# Patient Record
Sex: MALE | Race: WHITE | NOT HISPANIC OR LATINO
[De-identification: names, ages, dates, MRNs, and addresses within clinical notes are randomized per-mention and may not be internally consistent; named-entity substitution may affect disease eponyms.]

---

## 2017-01-23 ENCOUNTER — APPOINTMENT (OUTPATIENT)
Dept: ALLERGY | Facility: CLINIC | Age: 77
End: 2017-01-23

## 2017-01-23 VITALS
HEIGHT: 73 IN | BODY MASS INDEX: 34.19 KG/M2 | HEART RATE: 68 BPM | WEIGHT: 258 LBS | RESPIRATION RATE: 16 BRPM | DIASTOLIC BLOOD PRESSURE: 70 MMHG | SYSTOLIC BLOOD PRESSURE: 128 MMHG

## 2017-01-23 RX ORDER — LINEZOLID 600 MG/1
600 TABLET, FILM COATED ORAL
Qty: 20 | Refills: 0 | Status: DISCONTINUED | COMMUNITY
Start: 2016-10-28

## 2017-01-23 RX ORDER — CHLORHEXIDINE GLUCONATE, 0.12% ORAL RINSE 1.2 MG/ML
0.12 SOLUTION DENTAL
Qty: 473 | Refills: 0 | Status: DISCONTINUED | COMMUNITY
Start: 2016-09-15

## 2017-01-23 RX ORDER — HYDROCORTISONE VALERATE 2 MG/G
0.2 OINTMENT TOPICAL
Qty: 60 | Refills: 0 | Status: DISCONTINUED | COMMUNITY
Start: 2016-11-14

## 2017-01-23 RX ORDER — OXYCODONE AND ACETAMINOPHEN 5; 325 MG/1; MG/1
5-325 TABLET ORAL
Qty: 4 | Refills: 0 | Status: DISCONTINUED | COMMUNITY
Start: 2016-11-02

## 2017-01-23 RX ORDER — KETOCONAZOLE 20 MG/G
2 CREAM TOPICAL
Qty: 60 | Refills: 0 | Status: DISCONTINUED | COMMUNITY
Start: 2016-11-14

## 2017-01-23 RX ORDER — TRAMADOL HYDROCHLORIDE 50 MG/1
50 TABLET, COATED ORAL
Qty: 60 | Refills: 0 | Status: DISCONTINUED | COMMUNITY
Start: 2016-10-05

## 2017-01-23 RX ORDER — TAMSULOSIN HYDROCHLORIDE 0.4 MG/1
0.4 CAPSULE ORAL
Qty: 90 | Refills: 0 | Status: DISCONTINUED | COMMUNITY
Start: 2016-09-08

## 2017-01-23 RX ORDER — MIRABEGRON 50 MG/1
50 TABLET, FILM COATED, EXTENDED RELEASE ORAL
Qty: 90 | Refills: 0 | Status: ACTIVE | COMMUNITY
Start: 2016-09-08

## 2017-02-07 ENCOUNTER — APPOINTMENT (OUTPATIENT)
Dept: ALLERGY | Facility: CLINIC | Age: 77
End: 2017-02-07

## 2017-02-07 VITALS
RESPIRATION RATE: 16 BRPM | SYSTOLIC BLOOD PRESSURE: 140 MMHG | BODY MASS INDEX: 35.12 KG/M2 | HEART RATE: 72 BPM | DIASTOLIC BLOOD PRESSURE: 70 MMHG | WEIGHT: 265 LBS | HEIGHT: 73 IN

## 2017-03-06 ENCOUNTER — APPOINTMENT (OUTPATIENT)
Dept: ALLERGY | Facility: CLINIC | Age: 77
End: 2017-03-06

## 2017-03-06 VITALS
DIASTOLIC BLOOD PRESSURE: 70 MMHG | WEIGHT: 265 LBS | HEART RATE: 68 BPM | RESPIRATION RATE: 16 BRPM | BODY MASS INDEX: 35.12 KG/M2 | HEIGHT: 73 IN | SYSTOLIC BLOOD PRESSURE: 130 MMHG

## 2017-03-06 RX ORDER — PREDNISONE 10 MG/1
10 TABLET ORAL
Qty: 60 | Refills: 0 | Status: DISCONTINUED | COMMUNITY
Start: 2017-02-14 | End: 2017-03-06

## 2017-03-06 RX ORDER — PREDNISONE 10 MG/1
10 TABLET ORAL
Qty: 60 | Refills: 0 | Status: DISCONTINUED | COMMUNITY
Start: 2017-01-23 | End: 2017-03-06

## 2017-03-06 RX ORDER — DOXYCYCLINE HYCLATE 100 MG/1
100 CAPSULE ORAL
Qty: 20 | Refills: 0 | Status: DISCONTINUED | COMMUNITY
Start: 2017-01-18 | End: 2017-03-06

## 2017-03-06 RX ORDER — AMOXICILLIN 500 MG/1
500 CAPSULE ORAL
Qty: 25 | Refills: 0 | Status: DISCONTINUED | COMMUNITY
Start: 2016-09-15 | End: 2017-03-06

## 2017-03-28 ENCOUNTER — APPOINTMENT (OUTPATIENT)
Dept: ALLERGY | Facility: CLINIC | Age: 77
End: 2017-03-28

## 2017-03-28 VITALS
RESPIRATION RATE: 16 BRPM | WEIGHT: 265 LBS | HEART RATE: 68 BPM | HEIGHT: 73 IN | SYSTOLIC BLOOD PRESSURE: 140 MMHG | BODY MASS INDEX: 35.12 KG/M2 | DIASTOLIC BLOOD PRESSURE: 94 MMHG

## 2017-03-28 RX ORDER — SOFT LENS DISINFECTANT
SOLUTION, NON-ORAL MISCELLANEOUS
Qty: 1 | Refills: 0 | Status: ACTIVE | OUTPATIENT
Start: 2017-03-28

## 2017-04-04 ENCOUNTER — APPOINTMENT (OUTPATIENT)
Dept: ALLERGY | Facility: CLINIC | Age: 77
End: 2017-04-04

## 2017-04-04 ENCOUNTER — APPOINTMENT (OUTPATIENT)
Dept: RADIOLOGY | Facility: IMAGING CENTER | Age: 77
End: 2017-04-04

## 2017-04-04 ENCOUNTER — OUTPATIENT (OUTPATIENT)
Dept: OUTPATIENT SERVICES | Facility: HOSPITAL | Age: 77
LOS: 1 days | End: 2017-04-04
Payer: COMMERCIAL

## 2017-04-04 VITALS
DIASTOLIC BLOOD PRESSURE: 88 MMHG | HEART RATE: 72 BPM | BODY MASS INDEX: 35.12 KG/M2 | WEIGHT: 265 LBS | HEIGHT: 73 IN | RESPIRATION RATE: 16 BRPM | SYSTOLIC BLOOD PRESSURE: 140 MMHG

## 2017-04-04 DIAGNOSIS — J45.909 UNSPECIFIED ASTHMA, UNCOMPLICATED: ICD-10-CM

## 2017-04-04 PROCEDURE — 71046 X-RAY EXAM CHEST 2 VIEWS: CPT

## 2017-04-04 PROCEDURE — 71020: CPT | Mod: 26

## 2017-04-10 ENCOUNTER — MESSAGE (OUTPATIENT)
Age: 77
End: 2017-04-10

## 2017-04-13 ENCOUNTER — APPOINTMENT (OUTPATIENT)
Dept: ALLERGY | Facility: CLINIC | Age: 77
End: 2017-04-13

## 2017-04-17 ENCOUNTER — MESSAGE (OUTPATIENT)
Age: 77
End: 2017-04-17

## 2017-04-24 ENCOUNTER — RX RENEWAL (OUTPATIENT)
Age: 77
End: 2017-04-24

## 2017-04-26 ENCOUNTER — APPOINTMENT (OUTPATIENT)
Dept: ALLERGY | Facility: CLINIC | Age: 77
End: 2017-04-26

## 2017-04-26 VITALS
BODY MASS INDEX: 35.12 KG/M2 | DIASTOLIC BLOOD PRESSURE: 80 MMHG | RESPIRATION RATE: 14 BRPM | HEART RATE: 72 BPM | WEIGHT: 265 LBS | SYSTOLIC BLOOD PRESSURE: 140 MMHG | HEIGHT: 73 IN

## 2017-04-26 RX ORDER — AMOXICILLIN AND CLAVULANATE POTASSIUM 875; 125 MG/1; MG/1
875-125 TABLET, COATED ORAL
Qty: 20 | Refills: 0 | Status: DISCONTINUED | COMMUNITY
Start: 2017-04-04 | End: 2017-04-26

## 2017-05-02 ENCOUNTER — APPOINTMENT (OUTPATIENT)
Dept: CT IMAGING | Facility: CLINIC | Age: 77
End: 2017-05-02

## 2017-05-02 ENCOUNTER — OUTPATIENT (OUTPATIENT)
Dept: OUTPATIENT SERVICES | Facility: HOSPITAL | Age: 77
LOS: 1 days | End: 2017-05-02
Payer: COMMERCIAL

## 2017-05-02 DIAGNOSIS — Z00.8 ENCOUNTER FOR OTHER GENERAL EXAMINATION: ICD-10-CM

## 2017-05-02 PROCEDURE — 71250 CT THORAX DX C-: CPT | Mod: 26

## 2017-05-02 PROCEDURE — 71250 CT THORAX DX C-: CPT

## 2017-05-08 ENCOUNTER — MESSAGE (OUTPATIENT)
Age: 77
End: 2017-05-08

## 2017-05-10 ENCOUNTER — MESSAGE (OUTPATIENT)
Age: 77
End: 2017-05-10

## 2017-05-11 ENCOUNTER — APPOINTMENT (OUTPATIENT)
Dept: ALLERGY | Facility: CLINIC | Age: 77
End: 2017-05-11

## 2017-05-11 ENCOUNTER — APPOINTMENT (OUTPATIENT)
Dept: PULMONOLOGY | Facility: CLINIC | Age: 77
End: 2017-05-11

## 2017-05-11 VITALS
RESPIRATION RATE: 17 BRPM | WEIGHT: 278 LBS | HEIGHT: 73 IN | DIASTOLIC BLOOD PRESSURE: 60 MMHG | HEART RATE: 90 BPM | OXYGEN SATURATION: 94 % | SYSTOLIC BLOOD PRESSURE: 130 MMHG | BODY MASS INDEX: 36.84 KG/M2

## 2017-05-11 DIAGNOSIS — Z86.39 PERSONAL HISTORY OF OTHER ENDOCRINE, NUTRITIONAL AND METABOLIC DISEASE: ICD-10-CM

## 2017-05-11 DIAGNOSIS — F41.9 ANXIETY DISORDER, UNSPECIFIED: ICD-10-CM

## 2017-05-11 DIAGNOSIS — F32.9 ANXIETY DISORDER, UNSPECIFIED: ICD-10-CM

## 2017-05-11 DIAGNOSIS — F10.21 ALCOHOL DEPENDENCE, IN REMISSION: ICD-10-CM

## 2017-05-11 DIAGNOSIS — Z86.79 PERSONAL HISTORY OF OTHER DISEASES OF THE CIRCULATORY SYSTEM: ICD-10-CM

## 2017-05-11 DIAGNOSIS — Z81.8 FAMILY HISTORY OF OTHER MENTAL AND BEHAVIORAL DISORDERS: ICD-10-CM

## 2017-05-11 DIAGNOSIS — N40.0 BENIGN PROSTATIC HYPERPLASIA WITHOUT LOWER URINARY TRACT SYMPMS: ICD-10-CM

## 2017-05-11 DIAGNOSIS — N32.89 OTHER SPECIFIED DISORDERS OF BLADDER: ICD-10-CM

## 2017-05-11 RX ORDER — PREDNISONE 10 MG/1
10 TABLET ORAL
Qty: 60 | Refills: 0 | Status: DISCONTINUED | COMMUNITY
Start: 2017-03-28 | End: 2017-05-11

## 2017-05-11 RX ORDER — RAMIPRIL 5 MG/1
5 CAPSULE ORAL
Refills: 0 | Status: ACTIVE | COMMUNITY

## 2017-05-12 ENCOUNTER — LABORATORY RESULT (OUTPATIENT)
Age: 77
End: 2017-05-12

## 2017-05-13 LAB
24R-OH-CALCIDIOL SERPL-MCNC: 60.9 PG/ML
25(OH)D3 SERPL-MCNC: 32.4 NG/ML
BASOPHILS # BLD AUTO: 0.01 K/UL
BASOPHILS NFR BLD AUTO: 0.1 %
DEPRECATED KAPPA LC FREE/LAMBDA SER: 0.65 RATIO
EOSINOPHIL # BLD AUTO: 0.01 K/UL
EOSINOPHIL NFR BLD AUTO: 0.1 %
HCT VFR BLD CALC: 34.3 %
HGB BLD-MCNC: 12.1 G/DL
IGA SER QL IEP: 116 MG/DL
IGE SER-MCNC: 189 IU/ML
IGG SER QL IEP: 550 MG/DL
IGM SER QL IEP: 68 MG/DL
IMM GRANULOCYTES NFR BLD AUTO: 0.3 %
KAPPA LC CSF-MCNC: 1.71 MG/DL
KAPPA LC SERPL-MCNC: 1.12 MG/DL
LYMPHOCYTES # BLD AUTO: 0.88 K/UL
LYMPHOCYTES NFR BLD AUTO: 12.8 %
MAN DIFF?: NORMAL
MCHC RBC-ENTMCNC: 35.3 GM/DL
MCHC RBC-ENTMCNC: 35.3 PG
MCV RBC AUTO: 100 FL
MONOCYTES # BLD AUTO: 0.31 K/UL
MONOCYTES NFR BLD AUTO: 4.5 %
NEUTROPHILS # BLD AUTO: 5.62 K/UL
NEUTROPHILS NFR BLD AUTO: 82.2 %
PLATELET # BLD AUTO: 151 K/UL
RBC # BLD: 3.43 M/UL
RBC # FLD: 24.8 %
WBC # FLD AUTO: 6.85 K/UL

## 2017-05-17 ENCOUNTER — APPOINTMENT (OUTPATIENT)
Dept: SLEEP CENTER | Facility: CLINIC | Age: 77
End: 2017-05-17

## 2017-05-17 ENCOUNTER — OUTPATIENT (OUTPATIENT)
Dept: OUTPATIENT SERVICES | Facility: HOSPITAL | Age: 77
LOS: 1 days | End: 2017-05-17
Payer: MEDICARE

## 2017-05-17 LAB
A FLAVUS AB FLD QL: NEGATIVE
A FUMIGATUS AB FLD QL: NEGATIVE
A NIGER AB FLD QL: NEGATIVE

## 2017-05-17 PROCEDURE — G0399: CPT

## 2017-05-18 LAB
ASPERGILLUS FLAVUS PRECIPITINS: NEGATIVE
ASPERGILLUS FUMIGATES PRECIPTINS: NEGATIVE
ASPERGILLUS NIGER PRECIPITINS: NEGATIVE
DEPRECATED S PNEUM 1 IGG SER-MCNC: 12.6 MCG/ML
DEPRECATED S PNEUM12 AB SER-ACNC: 0.7 MCG/ML
DEPRECATED S PNEUM14 AB SER-ACNC: 6.5 MCG/ML
DEPRECATED S PNEUM17 IGG SER IA-MCNC: 17.4 MCG/ML
DEPRECATED S PNEUM18 IGG SER IA-MCNC: 1.6 MCG/ML
DEPRECATED S PNEUM19 IGG SER-MCNC: 14.8 MCG/ML
DEPRECATED S PNEUM19 IGG SER-MCNC: 6.7 MCG/ML
DEPRECATED S PNEUM2 IGG SER-MCNC: 0.9 MCG/ML
DEPRECATED S PNEUM20 IGG SER-MCNC: 3.4 MCG/ML
DEPRECATED S PNEUM22 IGG SER-MCNC: 11.3 MCG/ML
DEPRECATED S PNEUM23 AB SER-ACNC: 25.4 MCG/ML
DEPRECATED S PNEUM3 AB SER-ACNC: 2.5 MCG/ML
DEPRECATED S PNEUM34 IGG SER-MCNC: 15.9 MCG/ML
DEPRECATED S PNEUM4 AB SER-ACNC: 0.7 MCG/ML
DEPRECATED S PNEUM5 IGG SER-MCNC: 8.6 MCG/ML
DEPRECATED S PNEUM6 IGG SER-MCNC: 17.2 MCG/ML
DEPRECATED S PNEUM7 IGG SER-ACNC: 7.1 MCG/ML
DEPRECATED S PNEUM8 AB SER-ACNC: 6.7 MCG/ML
DEPRECATED S PNEUM9 AB SER-ACNC: 6.4 MCG/ML
DEPRECATED S PNEUM9 IGG SER-MCNC: 8 MCG/ML
IGG SUBSET TOTAL IGG: 561 MG/DL
IGG1 SER-MCNC: 291 MG/DL
IGG2 SER-MCNC: 193 MG/DL
IGG3 SER-MCNC: 22.7 MG/DL
IGG4 SER-MCNC: 41.2 MG/DL
STREPTOCOCCUS PNEUMONIAE SEROTYPE 11A: 3.9 MCG/ML
STREPTOCOCCUS PNEUMONIAE SEROTYPE 15B: 6 MCG/ML
STREPTOCOCCUS PNEUMONIAE SEROTYPE 33F: 4.1 MCG/ML

## 2017-05-22 ENCOUNTER — RESULT REVIEW (OUTPATIENT)
Age: 77
End: 2017-05-22

## 2017-05-22 ENCOUNTER — MEDICATION RENEWAL (OUTPATIENT)
Age: 77
End: 2017-05-22

## 2017-05-23 DIAGNOSIS — G47.33 OBSTRUCTIVE SLEEP APNEA (ADULT) (PEDIATRIC): ICD-10-CM

## 2017-06-05 ENCOUNTER — MEDICATION RENEWAL (OUTPATIENT)
Age: 77
End: 2017-06-05

## 2017-06-08 ENCOUNTER — APPOINTMENT (OUTPATIENT)
Dept: PULMONOLOGY | Facility: CLINIC | Age: 77
End: 2017-06-08

## 2017-06-08 VITALS
BODY MASS INDEX: 36.45 KG/M2 | HEIGHT: 73 IN | DIASTOLIC BLOOD PRESSURE: 72 MMHG | WEIGHT: 275 LBS | OXYGEN SATURATION: 94 % | RESPIRATION RATE: 17 BRPM | SYSTOLIC BLOOD PRESSURE: 164 MMHG | HEART RATE: 96 BPM

## 2017-06-08 RX ORDER — FINASTERIDE 5 MG/1
5 TABLET, FILM COATED ORAL
Qty: 30 | Refills: 0 | Status: ACTIVE | COMMUNITY
Start: 2017-01-31

## 2017-07-11 ENCOUNTER — APPOINTMENT (OUTPATIENT)
Dept: PULMONOLOGY | Facility: CLINIC | Age: 77
End: 2017-07-11
Payer: COMMERCIAL

## 2017-07-11 VITALS
HEIGHT: 73 IN | DIASTOLIC BLOOD PRESSURE: 80 MMHG | HEART RATE: 92 BPM | BODY MASS INDEX: 36.45 KG/M2 | OXYGEN SATURATION: 94 % | SYSTOLIC BLOOD PRESSURE: 135 MMHG | WEIGHT: 275 LBS | RESPIRATION RATE: 16 BRPM

## 2017-07-11 PROCEDURE — 94010 BREATHING CAPACITY TEST: CPT

## 2017-07-11 PROCEDURE — 99214 OFFICE O/P EST MOD 30 MIN: CPT | Mod: 25

## 2017-07-11 PROCEDURE — 94729 DIFFUSING CAPACITY: CPT

## 2017-08-02 ENCOUNTER — APPOINTMENT (OUTPATIENT)
Dept: SLEEP CENTER | Facility: CLINIC | Age: 77
End: 2017-08-02
Payer: COMMERCIAL

## 2017-08-02 ENCOUNTER — OUTPATIENT (OUTPATIENT)
Dept: OUTPATIENT SERVICES | Facility: HOSPITAL | Age: 77
LOS: 1 days | End: 2017-08-02
Payer: COMMERCIAL

## 2017-08-02 PROCEDURE — G0400: CPT

## 2017-08-02 PROCEDURE — G0400: CPT | Mod: 26

## 2017-08-09 DIAGNOSIS — G47.33 OBSTRUCTIVE SLEEP APNEA (ADULT) (PEDIATRIC): ICD-10-CM

## 2017-08-22 ENCOUNTER — OUTPATIENT (OUTPATIENT)
Dept: OUTPATIENT SERVICES | Facility: HOSPITAL | Age: 77
LOS: 1 days | End: 2017-08-22
Payer: COMMERCIAL

## 2017-08-22 ENCOUNTER — APPOINTMENT (OUTPATIENT)
Dept: SLEEP CENTER | Facility: CLINIC | Age: 77
End: 2017-08-22
Payer: COMMERCIAL

## 2017-08-22 PROCEDURE — 95811 POLYSOM 6/>YRS CPAP 4/> PARM: CPT

## 2017-08-22 PROCEDURE — 95811 POLYSOM 6/>YRS CPAP 4/> PARM: CPT | Mod: 26

## 2017-08-24 DIAGNOSIS — G47.33 OBSTRUCTIVE SLEEP APNEA (ADULT) (PEDIATRIC): ICD-10-CM

## 2017-08-31 ENCOUNTER — APPOINTMENT (OUTPATIENT)
Dept: PULMONOLOGY | Facility: CLINIC | Age: 77
End: 2017-08-31
Payer: COMMERCIAL

## 2017-08-31 VITALS
OXYGEN SATURATION: 95 % | HEART RATE: 85 BPM | RESPIRATION RATE: 16 BRPM | HEIGHT: 73 IN | SYSTOLIC BLOOD PRESSURE: 135 MMHG | DIASTOLIC BLOOD PRESSURE: 80 MMHG | WEIGHT: 287 LBS | BODY MASS INDEX: 38.04 KG/M2

## 2017-08-31 DIAGNOSIS — Z29.9 ENCOUNTER FOR PROPHYLACTIC MEASURES, UNSPECIFIED: ICD-10-CM

## 2017-08-31 PROCEDURE — 95012 NITRIC OXIDE EXP GAS DETER: CPT

## 2017-08-31 PROCEDURE — 94010 BREATHING CAPACITY TEST: CPT

## 2017-08-31 PROCEDURE — 99214 OFFICE O/P EST MOD 30 MIN: CPT | Mod: 25

## 2017-08-31 PROCEDURE — 96372 THER/PROPH/DIAG INJ SC/IM: CPT

## 2017-08-31 RX ORDER — OMALIZUMAB 202.5 MG/1.4ML
150 INJECTION, SOLUTION SUBCUTANEOUS
Qty: 45 | Refills: 0 | Status: COMPLETED | OUTPATIENT
Start: 2017-08-31

## 2017-08-31 RX ORDER — FLUTICASONE PROPIONATE AND SALMETEROL 50; 500 UG/1; UG/1
500-50 POWDER RESPIRATORY (INHALATION) TWICE DAILY
Qty: 3 | Refills: 1 | Status: DISCONTINUED | COMMUNITY
Start: 2017-03-06 | End: 2017-08-31

## 2017-08-31 RX ADMIN — OMALIZUMAB 0 MG: 202.5 INJECTION, SOLUTION SUBCUTANEOUS at 00:00

## 2017-09-14 ENCOUNTER — APPOINTMENT (OUTPATIENT)
Dept: PULMONOLOGY | Facility: CLINIC | Age: 77
End: 2017-09-14
Payer: COMMERCIAL

## 2017-09-14 VITALS
OXYGEN SATURATION: 97 % | RESPIRATION RATE: 16 BRPM | SYSTOLIC BLOOD PRESSURE: 140 MMHG | HEART RATE: 83 BPM | HEIGHT: 73 IN | DIASTOLIC BLOOD PRESSURE: 80 MMHG | WEIGHT: 287 LBS | BODY MASS INDEX: 38.04 KG/M2

## 2017-09-14 PROCEDURE — 96372 THER/PROPH/DIAG INJ SC/IM: CPT

## 2017-09-14 RX ORDER — OMALIZUMAB 202.5 MG/1.4ML
150 INJECTION, SOLUTION SUBCUTANEOUS
Qty: 45 | Refills: 0 | Status: COMPLETED | OUTPATIENT
Start: 2017-09-14

## 2017-09-14 RX ADMIN — OMALIZUMAB 0 MG: 202.5 INJECTION, SOLUTION SUBCUTANEOUS at 00:00

## 2017-09-19 ENCOUNTER — RESULT REVIEW (OUTPATIENT)
Age: 77
End: 2017-09-19

## 2017-09-28 ENCOUNTER — APPOINTMENT (OUTPATIENT)
Dept: PULMONOLOGY | Facility: CLINIC | Age: 77
End: 2017-09-28
Payer: COMMERCIAL

## 2017-09-28 VITALS
BODY MASS INDEX: 38.04 KG/M2 | OXYGEN SATURATION: 96 % | DIASTOLIC BLOOD PRESSURE: 80 MMHG | RESPIRATION RATE: 17 BRPM | HEART RATE: 88 BPM | WEIGHT: 287 LBS | SYSTOLIC BLOOD PRESSURE: 128 MMHG | HEIGHT: 73 IN

## 2017-09-28 PROCEDURE — 96372 THER/PROPH/DIAG INJ SC/IM: CPT

## 2017-09-28 RX ORDER — OMALIZUMAB 202.5 MG/1.4ML
150 INJECTION, SOLUTION SUBCUTANEOUS
Qty: 45 | Refills: 0 | Status: COMPLETED | OUTPATIENT
Start: 2017-09-28

## 2017-09-28 RX ADMIN — OMALIZUMAB 0 MG: 202.5 INJECTION, SOLUTION SUBCUTANEOUS at 00:00

## 2017-10-11 ENCOUNTER — APPOINTMENT (OUTPATIENT)
Dept: PULMONOLOGY | Facility: CLINIC | Age: 77
End: 2017-10-11
Payer: COMMERCIAL

## 2017-10-11 VITALS
HEART RATE: 83 BPM | SYSTOLIC BLOOD PRESSURE: 128 MMHG | WEIGHT: 287 LBS | OXYGEN SATURATION: 94 % | HEIGHT: 73 IN | DIASTOLIC BLOOD PRESSURE: 80 MMHG | BODY MASS INDEX: 38.04 KG/M2

## 2017-10-11 PROCEDURE — 95012 NITRIC OXIDE EXP GAS DETER: CPT

## 2017-10-11 PROCEDURE — 99214 OFFICE O/P EST MOD 30 MIN: CPT | Mod: 25

## 2017-10-11 PROCEDURE — 96372 THER/PROPH/DIAG INJ SC/IM: CPT

## 2017-10-11 PROCEDURE — 94010 BREATHING CAPACITY TEST: CPT

## 2017-10-11 PROCEDURE — G0008: CPT

## 2017-10-11 PROCEDURE — 90662 IIV NO PRSV INCREASED AG IM: CPT

## 2017-10-11 RX ORDER — OMALIZUMAB 202.5 MG/1.4ML
150 INJECTION, SOLUTION SUBCUTANEOUS
Qty: 45 | Refills: 0 | Status: COMPLETED | OUTPATIENT
Start: 2017-10-11

## 2017-10-11 RX ADMIN — OMALIZUMAB 0 MG: 202.5 INJECTION, SOLUTION SUBCUTANEOUS at 00:00

## 2017-10-25 ENCOUNTER — APPOINTMENT (OUTPATIENT)
Dept: PULMONOLOGY | Facility: CLINIC | Age: 77
End: 2017-10-25
Payer: COMMERCIAL

## 2017-10-25 VITALS
SYSTOLIC BLOOD PRESSURE: 130 MMHG | RESPIRATION RATE: 17 BRPM | OXYGEN SATURATION: 93 % | WEIGHT: 287 LBS | HEIGHT: 73 IN | DIASTOLIC BLOOD PRESSURE: 80 MMHG | HEART RATE: 72 BPM | BODY MASS INDEX: 38.04 KG/M2

## 2017-10-25 PROCEDURE — 96372 THER/PROPH/DIAG INJ SC/IM: CPT

## 2017-10-25 RX ORDER — OMALIZUMAB 202.5 MG/1.4ML
150 INJECTION, SOLUTION SUBCUTANEOUS
Qty: 45 | Refills: 0 | Status: COMPLETED | OUTPATIENT
Start: 2017-10-25

## 2017-10-25 RX ADMIN — OMALIZUMAB 0 MG: 202.5 INJECTION, SOLUTION SUBCUTANEOUS at 00:00

## 2017-11-08 ENCOUNTER — APPOINTMENT (OUTPATIENT)
Dept: PULMONOLOGY | Facility: CLINIC | Age: 77
End: 2017-11-08
Payer: COMMERCIAL

## 2017-11-08 ENCOUNTER — MEDICATION RENEWAL (OUTPATIENT)
Age: 77
End: 2017-11-08

## 2017-11-08 VITALS
DIASTOLIC BLOOD PRESSURE: 80 MMHG | HEIGHT: 73 IN | WEIGHT: 287 LBS | OXYGEN SATURATION: 93 % | BODY MASS INDEX: 38.04 KG/M2 | SYSTOLIC BLOOD PRESSURE: 130 MMHG | HEART RATE: 93 BPM

## 2017-11-08 PROCEDURE — 96372 THER/PROPH/DIAG INJ SC/IM: CPT

## 2017-11-08 RX ORDER — OMALIZUMAB 202.5 MG/1.4ML
150 INJECTION, SOLUTION SUBCUTANEOUS
Qty: 45 | Refills: 0 | Status: COMPLETED | OUTPATIENT
Start: 2017-11-08

## 2017-11-08 RX ADMIN — OMALIZUMAB 0 MG: 202.5 INJECTION, SOLUTION SUBCUTANEOUS at 00:00

## 2017-11-14 ENCOUNTER — APPOINTMENT (OUTPATIENT)
Dept: SLEEP CENTER | Facility: CLINIC | Age: 77
End: 2017-11-14
Payer: COMMERCIAL

## 2017-11-14 ENCOUNTER — OUTPATIENT (OUTPATIENT)
Dept: OUTPATIENT SERVICES | Facility: HOSPITAL | Age: 77
LOS: 1 days | End: 2017-11-14
Payer: COMMERCIAL

## 2017-11-14 PROCEDURE — 95811 POLYSOM 6/>YRS CPAP 4/> PARM: CPT | Mod: 26

## 2017-11-14 PROCEDURE — 95811 POLYSOM 6/>YRS CPAP 4/> PARM: CPT

## 2017-11-15 DIAGNOSIS — G47.33 OBSTRUCTIVE SLEEP APNEA (ADULT) (PEDIATRIC): ICD-10-CM

## 2017-11-22 ENCOUNTER — APPOINTMENT (OUTPATIENT)
Dept: PULMONOLOGY | Facility: CLINIC | Age: 77
End: 2017-11-22
Payer: COMMERCIAL

## 2017-11-22 VITALS
HEART RATE: 98 BPM | OXYGEN SATURATION: 93 % | HEIGHT: 73 IN | SYSTOLIC BLOOD PRESSURE: 130 MMHG | BODY MASS INDEX: 38.04 KG/M2 | WEIGHT: 287 LBS | DIASTOLIC BLOOD PRESSURE: 80 MMHG

## 2017-11-22 PROCEDURE — 96372 THER/PROPH/DIAG INJ SC/IM: CPT

## 2017-11-22 RX ORDER — OMALIZUMAB 202.5 MG/1.4ML
150 INJECTION, SOLUTION SUBCUTANEOUS
Qty: 45 | Refills: 0 | Status: COMPLETED | OUTPATIENT
Start: 2017-11-22

## 2017-11-22 RX ADMIN — OMALIZUMAB 0 MG: 202.5 INJECTION, SOLUTION SUBCUTANEOUS at 00:00

## 2017-11-30 ENCOUNTER — RESULT REVIEW (OUTPATIENT)
Age: 77
End: 2017-11-30

## 2017-12-06 ENCOUNTER — APPOINTMENT (OUTPATIENT)
Dept: PULMONOLOGY | Facility: CLINIC | Age: 77
End: 2017-12-06
Payer: COMMERCIAL

## 2017-12-06 VITALS
HEIGHT: 73 IN | BODY MASS INDEX: 37.91 KG/M2 | WEIGHT: 286 LBS | DIASTOLIC BLOOD PRESSURE: 70 MMHG | HEART RATE: 75 BPM | OXYGEN SATURATION: 95 % | SYSTOLIC BLOOD PRESSURE: 140 MMHG | RESPIRATION RATE: 16 BRPM

## 2017-12-06 DIAGNOSIS — Z23 ENCOUNTER FOR IMMUNIZATION: ICD-10-CM

## 2017-12-06 PROCEDURE — 96372 THER/PROPH/DIAG INJ SC/IM: CPT

## 2017-12-06 PROCEDURE — 99214 OFFICE O/P EST MOD 30 MIN: CPT | Mod: 25

## 2017-12-06 RX ORDER — FUROSEMIDE 40 MG/1
40 TABLET ORAL
Qty: 30 | Refills: 0 | Status: ACTIVE | COMMUNITY
Start: 2017-10-05

## 2017-12-06 RX ORDER — OMALIZUMAB 202.5 MG/1.4ML
150 INJECTION, SOLUTION SUBCUTANEOUS
Qty: 1 | Refills: 0 | Status: COMPLETED | OUTPATIENT
Start: 2017-12-06

## 2017-12-06 RX ORDER — POTASSIUM CHLORIDE 1500 MG/1
20 TABLET, EXTENDED RELEASE ORAL
Qty: 30 | Refills: 0 | Status: ACTIVE | COMMUNITY
Start: 2017-10-05

## 2017-12-06 RX ADMIN — OMALIZUMAB 0 MG: 202.5 INJECTION, SOLUTION SUBCUTANEOUS at 00:00

## 2017-12-18 ENCOUNTER — RX RENEWAL (OUTPATIENT)
Age: 77
End: 2017-12-18

## 2017-12-21 ENCOUNTER — APPOINTMENT (OUTPATIENT)
Dept: PULMONOLOGY | Facility: CLINIC | Age: 77
End: 2017-12-21
Payer: COMMERCIAL

## 2017-12-21 VITALS
RESPIRATION RATE: 17 BRPM | HEIGHT: 73 IN | OXYGEN SATURATION: 96 % | SYSTOLIC BLOOD PRESSURE: 136 MMHG | WEIGHT: 286 LBS | BODY MASS INDEX: 37.91 KG/M2 | DIASTOLIC BLOOD PRESSURE: 64 MMHG | HEART RATE: 77 BPM

## 2017-12-21 PROCEDURE — 96372 THER/PROPH/DIAG INJ SC/IM: CPT

## 2017-12-21 RX ADMIN — OMALIZUMAB 0 MG: 202.5 INJECTION, SOLUTION SUBCUTANEOUS at 00:00

## 2018-01-04 ENCOUNTER — APPOINTMENT (OUTPATIENT)
Dept: PULMONOLOGY | Facility: CLINIC | Age: 78
End: 2018-01-04

## 2018-01-23 ENCOUNTER — APPOINTMENT (OUTPATIENT)
Dept: PULMONOLOGY | Facility: CLINIC | Age: 78
End: 2018-01-23
Payer: COMMERCIAL

## 2018-01-23 VITALS
HEIGHT: 73 IN | OXYGEN SATURATION: 94 % | WEIGHT: 286 LBS | HEART RATE: 84 BPM | BODY MASS INDEX: 37.91 KG/M2 | SYSTOLIC BLOOD PRESSURE: 112 MMHG | RESPIRATION RATE: 17 BRPM | DIASTOLIC BLOOD PRESSURE: 70 MMHG

## 2018-01-23 PROCEDURE — 94010 BREATHING CAPACITY TEST: CPT

## 2018-01-23 PROCEDURE — 99214 OFFICE O/P EST MOD 30 MIN: CPT | Mod: 25

## 2018-01-23 PROCEDURE — 96372 THER/PROPH/DIAG INJ SC/IM: CPT

## 2018-01-23 PROCEDURE — 94729 DIFFUSING CAPACITY: CPT

## 2018-01-23 RX ORDER — OMALIZUMAB 202.5 MG/1.4ML
150 INJECTION, SOLUTION SUBCUTANEOUS
Qty: 45 | Refills: 0 | Status: COMPLETED | OUTPATIENT
Start: 2018-01-23

## 2018-01-23 RX ADMIN — OMALIZUMAB 0 MG: 202.5 INJECTION, SOLUTION SUBCUTANEOUS at 00:00

## 2018-02-02 ENCOUNTER — RX RENEWAL (OUTPATIENT)
Age: 78
End: 2018-02-02

## 2018-02-08 ENCOUNTER — RX RENEWAL (OUTPATIENT)
Age: 78
End: 2018-02-08

## 2018-02-12 ENCOUNTER — MEDICATION RENEWAL (OUTPATIENT)
Age: 78
End: 2018-02-12

## 2018-02-14 ENCOUNTER — APPOINTMENT (OUTPATIENT)
Dept: PULMONOLOGY | Facility: CLINIC | Age: 78
End: 2018-02-14
Payer: COMMERCIAL

## 2018-02-14 PROCEDURE — 96372 THER/PROPH/DIAG INJ SC/IM: CPT

## 2018-02-14 RX ORDER — OMALIZUMAB 202.5 MG/1.4ML
150 INJECTION, SOLUTION SUBCUTANEOUS
Qty: 45 | Refills: 0 | Status: COMPLETED | OUTPATIENT
Start: 2018-02-14

## 2018-02-14 RX ADMIN — OMALIZUMAB 0 MG: 202.5 INJECTION, SOLUTION SUBCUTANEOUS at 00:00

## 2018-03-02 ENCOUNTER — APPOINTMENT (OUTPATIENT)
Dept: PULMONOLOGY | Facility: CLINIC | Age: 78
End: 2018-03-02
Payer: COMMERCIAL

## 2018-03-02 VITALS
OXYGEN SATURATION: 95 % | DIASTOLIC BLOOD PRESSURE: 60 MMHG | HEIGHT: 73 IN | SYSTOLIC BLOOD PRESSURE: 120 MMHG | HEART RATE: 92 BPM | BODY MASS INDEX: 37.91 KG/M2 | WEIGHT: 286 LBS

## 2018-03-02 PROCEDURE — 96372 THER/PROPH/DIAG INJ SC/IM: CPT

## 2018-03-02 RX ORDER — OMALIZUMAB 202.5 MG/1.4ML
150 INJECTION, SOLUTION SUBCUTANEOUS
Qty: 45 | Refills: 0 | Status: COMPLETED | OUTPATIENT
Start: 2018-03-02

## 2018-03-02 RX ADMIN — OMALIZUMAB 1.5 MG: 202.5 INJECTION, SOLUTION SUBCUTANEOUS at 00:00

## 2018-03-07 ENCOUNTER — RX RENEWAL (OUTPATIENT)
Age: 78
End: 2018-03-07

## 2018-03-15 ENCOUNTER — APPOINTMENT (OUTPATIENT)
Dept: PULMONOLOGY | Facility: CLINIC | Age: 78
End: 2018-03-15
Payer: COMMERCIAL

## 2018-03-15 VITALS
HEIGHT: 73 IN | WEIGHT: 286 LBS | SYSTOLIC BLOOD PRESSURE: 120 MMHG | RESPIRATION RATE: 17 BRPM | DIASTOLIC BLOOD PRESSURE: 64 MMHG | HEART RATE: 88 BPM | OXYGEN SATURATION: 94 % | BODY MASS INDEX: 37.91 KG/M2

## 2018-03-15 PROCEDURE — 96372 THER/PROPH/DIAG INJ SC/IM: CPT

## 2018-03-15 RX ORDER — OMALIZUMAB 202.5 MG/1.4ML
150 INJECTION, SOLUTION SUBCUTANEOUS
Qty: 45 | Refills: 0 | Status: COMPLETED | OUTPATIENT
Start: 2018-03-15

## 2018-03-15 RX ADMIN — OMALIZUMAB 0 MG: 202.5 INJECTION, SOLUTION SUBCUTANEOUS at 00:00

## 2018-03-29 ENCOUNTER — APPOINTMENT (OUTPATIENT)
Dept: PULMONOLOGY | Facility: CLINIC | Age: 78
End: 2018-03-29
Payer: COMMERCIAL

## 2018-03-29 VITALS
DIASTOLIC BLOOD PRESSURE: 70 MMHG | WEIGHT: 286 LBS | RESPIRATION RATE: 17 BRPM | SYSTOLIC BLOOD PRESSURE: 110 MMHG | OXYGEN SATURATION: 95 % | HEIGHT: 73 IN | HEART RATE: 83 BPM | BODY MASS INDEX: 37.91 KG/M2

## 2018-03-29 PROCEDURE — 96372 THER/PROPH/DIAG INJ SC/IM: CPT

## 2018-03-29 RX ORDER — OMALIZUMAB 202.5 MG/1.4ML
150 INJECTION, SOLUTION SUBCUTANEOUS
Qty: 60 | Refills: 0 | Status: COMPLETED | OUTPATIENT
Start: 2018-03-29

## 2018-03-29 RX ADMIN — OMALIZUMAB 0 MG: 202.5 INJECTION, SOLUTION SUBCUTANEOUS at 00:00

## 2018-04-12 ENCOUNTER — APPOINTMENT (OUTPATIENT)
Dept: PULMONOLOGY | Facility: CLINIC | Age: 78
End: 2018-04-12
Payer: COMMERCIAL

## 2018-04-12 PROCEDURE — 96372 THER/PROPH/DIAG INJ SC/IM: CPT

## 2018-04-12 RX ORDER — OMALIZUMAB 202.5 MG/1.4ML
150 INJECTION, SOLUTION SUBCUTANEOUS
Qty: 45 | Refills: 0 | Status: COMPLETED | OUTPATIENT
Start: 2018-04-12

## 2018-04-12 RX ADMIN — OMALIZUMAB 0 MG: 202.5 INJECTION, SOLUTION SUBCUTANEOUS at 00:00

## 2018-04-25 ENCOUNTER — APPOINTMENT (OUTPATIENT)
Dept: PULMONOLOGY | Facility: CLINIC | Age: 78
End: 2018-04-25
Payer: COMMERCIAL

## 2018-04-25 VITALS
HEART RATE: 84 BPM | HEIGHT: 73 IN | BODY MASS INDEX: 37.91 KG/M2 | SYSTOLIC BLOOD PRESSURE: 138 MMHG | WEIGHT: 286 LBS | OXYGEN SATURATION: 95 % | RESPIRATION RATE: 17 BRPM | DIASTOLIC BLOOD PRESSURE: 78 MMHG

## 2018-04-25 PROCEDURE — 94010 BREATHING CAPACITY TEST: CPT

## 2018-04-25 PROCEDURE — 99214 OFFICE O/P EST MOD 30 MIN: CPT | Mod: 25

## 2018-04-25 PROCEDURE — 96372 THER/PROPH/DIAG INJ SC/IM: CPT

## 2018-04-25 RX ORDER — OMALIZUMAB 202.5 MG/1.4ML
150 INJECTION, SOLUTION SUBCUTANEOUS
Qty: 45 | Refills: 0 | Status: COMPLETED | OUTPATIENT
Start: 2018-04-25

## 2018-04-25 RX ORDER — OLOPATADINE HYDROCHLORIDE 665 UG/1
0.6 SPRAY, METERED NASAL
Qty: 3 | Refills: 1 | Status: ACTIVE | COMMUNITY
Start: 2018-04-25 | End: 1900-01-01

## 2018-04-25 RX ADMIN — OMALIZUMAB 0 MG: 202.5 INJECTION, SOLUTION SUBCUTANEOUS at 00:00

## 2018-05-09 ENCOUNTER — APPOINTMENT (OUTPATIENT)
Dept: PULMONOLOGY | Facility: CLINIC | Age: 78
End: 2018-05-09
Payer: COMMERCIAL

## 2018-05-09 VITALS
OXYGEN SATURATION: 95 % | WEIGHT: 286 LBS | SYSTOLIC BLOOD PRESSURE: 130 MMHG | DIASTOLIC BLOOD PRESSURE: 62 MMHG | RESPIRATION RATE: 17 BRPM | HEART RATE: 82 BPM | HEIGHT: 73 IN | BODY MASS INDEX: 37.91 KG/M2

## 2018-05-09 PROCEDURE — 96372 THER/PROPH/DIAG INJ SC/IM: CPT

## 2018-05-09 RX ORDER — OMALIZUMAB 202.5 MG/1.4ML
150 INJECTION, SOLUTION SUBCUTANEOUS
Qty: 45 | Refills: 0 | Status: COMPLETED | OUTPATIENT
Start: 2018-05-09

## 2018-05-09 RX ADMIN — OMALIZUMAB 0 MG: 202.5 INJECTION, SOLUTION SUBCUTANEOUS at 00:00

## 2018-05-23 ENCOUNTER — APPOINTMENT (OUTPATIENT)
Dept: PULMONOLOGY | Facility: CLINIC | Age: 78
End: 2018-05-23
Payer: COMMERCIAL

## 2018-05-23 VITALS
WEIGHT: 290 LBS | BODY MASS INDEX: 38.43 KG/M2 | RESPIRATION RATE: 17 BRPM | HEIGHT: 73 IN | SYSTOLIC BLOOD PRESSURE: 124 MMHG | HEART RATE: 92 BPM | OXYGEN SATURATION: 94 % | DIASTOLIC BLOOD PRESSURE: 62 MMHG

## 2018-05-23 PROCEDURE — 96372 THER/PROPH/DIAG INJ SC/IM: CPT

## 2018-05-23 RX ORDER — OMALIZUMAB 202.5 MG/1.4ML
150 INJECTION, SOLUTION SUBCUTANEOUS
Qty: 45 | Refills: 0 | Status: COMPLETED | OUTPATIENT
Start: 2018-05-23

## 2018-05-23 RX ADMIN — OMALIZUMAB 0 MG: 202.5 INJECTION, SOLUTION SUBCUTANEOUS at 00:00

## 2018-05-29 ENCOUNTER — RX RENEWAL (OUTPATIENT)
Age: 78
End: 2018-05-29

## 2018-06-06 ENCOUNTER — APPOINTMENT (OUTPATIENT)
Dept: PULMONOLOGY | Facility: CLINIC | Age: 78
End: 2018-06-06
Payer: COMMERCIAL

## 2018-06-06 VITALS
BODY MASS INDEX: 37.77 KG/M2 | OXYGEN SATURATION: 94 % | SYSTOLIC BLOOD PRESSURE: 142 MMHG | RESPIRATION RATE: 17 BRPM | WEIGHT: 285 LBS | DIASTOLIC BLOOD PRESSURE: 64 MMHG | HEART RATE: 67 BPM | HEIGHT: 73 IN

## 2018-06-06 PROCEDURE — 96372 THER/PROPH/DIAG INJ SC/IM: CPT

## 2018-06-06 RX ORDER — OMALIZUMAB 202.5 MG/1.4ML
150 INJECTION, SOLUTION SUBCUTANEOUS
Qty: 45 | Refills: 0 | Status: COMPLETED | OUTPATIENT
Start: 2018-06-06

## 2018-06-06 RX ADMIN — Medication 0 MG: at 00:00

## 2018-06-13 ENCOUNTER — RX RENEWAL (OUTPATIENT)
Age: 78
End: 2018-06-13

## 2018-06-19 ENCOUNTER — MEDICATION RENEWAL (OUTPATIENT)
Age: 78
End: 2018-06-19

## 2018-06-20 ENCOUNTER — APPOINTMENT (OUTPATIENT)
Dept: PULMONOLOGY | Facility: CLINIC | Age: 78
End: 2018-06-20
Payer: COMMERCIAL

## 2018-06-20 ENCOUNTER — NON-APPOINTMENT (OUTPATIENT)
Age: 78
End: 2018-06-20

## 2018-06-20 VITALS
HEIGHT: 73 IN | OXYGEN SATURATION: 94 % | BODY MASS INDEX: 37.77 KG/M2 | WEIGHT: 285 LBS | HEART RATE: 95 BPM | RESPIRATION RATE: 17 BRPM | SYSTOLIC BLOOD PRESSURE: 140 MMHG | DIASTOLIC BLOOD PRESSURE: 70 MMHG

## 2018-06-20 PROCEDURE — 94010 BREATHING CAPACITY TEST: CPT

## 2018-06-20 PROCEDURE — 99214 OFFICE O/P EST MOD 30 MIN: CPT | Mod: 25

## 2018-06-20 PROCEDURE — 96372 THER/PROPH/DIAG INJ SC/IM: CPT

## 2018-06-20 RX ORDER — OMALIZUMAB 202.5 MG/1.4ML
150 INJECTION, SOLUTION SUBCUTANEOUS
Qty: 45 | Refills: 0 | Status: COMPLETED | OUTPATIENT
Start: 2018-06-20

## 2018-06-20 RX ADMIN — Medication 0 MG: at 00:00

## 2018-06-21 ENCOUNTER — FORM ENCOUNTER (OUTPATIENT)
Age: 78
End: 2018-06-21

## 2018-06-22 ENCOUNTER — OUTPATIENT (OUTPATIENT)
Dept: OUTPATIENT SERVICES | Facility: HOSPITAL | Age: 78
LOS: 1 days | End: 2018-06-22
Payer: MEDICARE

## 2018-06-22 ENCOUNTER — APPOINTMENT (OUTPATIENT)
Dept: CT IMAGING | Facility: IMAGING CENTER | Age: 78
End: 2018-06-22
Payer: COMMERCIAL

## 2018-06-22 DIAGNOSIS — J39.8 OTHER SPECIFIED DISEASES OF UPPER RESPIRATORY TRACT: ICD-10-CM

## 2018-06-22 PROCEDURE — 71250 CT THORAX DX C-: CPT

## 2018-06-22 PROCEDURE — 71250 CT THORAX DX C-: CPT | Mod: 26

## 2018-07-03 ENCOUNTER — APPOINTMENT (OUTPATIENT)
Dept: WOUND CARE | Facility: CLINIC | Age: 78
End: 2018-07-03
Payer: COMMERCIAL

## 2018-07-03 VITALS
HEIGHT: 73 IN | TEMPERATURE: 98.1 F | RESPIRATION RATE: 18 BRPM | BODY MASS INDEX: 37.77 KG/M2 | DIASTOLIC BLOOD PRESSURE: 76 MMHG | HEART RATE: 87 BPM | SYSTOLIC BLOOD PRESSURE: 146 MMHG | WEIGHT: 285 LBS

## 2018-07-03 PROCEDURE — 99203 OFFICE O/P NEW LOW 30 MIN: CPT

## 2018-07-06 ENCOUNTER — APPOINTMENT (OUTPATIENT)
Dept: PULMONOLOGY | Facility: CLINIC | Age: 78
End: 2018-07-06
Payer: COMMERCIAL

## 2018-07-06 VITALS
HEART RATE: 94 BPM | WEIGHT: 285 LBS | OXYGEN SATURATION: 92 % | HEIGHT: 74 IN | SYSTOLIC BLOOD PRESSURE: 120 MMHG | DIASTOLIC BLOOD PRESSURE: 60 MMHG | BODY MASS INDEX: 36.57 KG/M2

## 2018-07-06 PROCEDURE — 96372 THER/PROPH/DIAG INJ SC/IM: CPT

## 2018-07-06 RX ORDER — OMALIZUMAB 202.5 MG/1.4ML
150 INJECTION, SOLUTION SUBCUTANEOUS
Qty: 45 | Refills: 0 | Status: COMPLETED | OUTPATIENT
Start: 2018-07-06

## 2018-07-06 RX ADMIN — Medication 0 MG: at 00:00

## 2018-07-09 ENCOUNTER — RX RENEWAL (OUTPATIENT)
Age: 78
End: 2018-07-09

## 2018-07-10 ENCOUNTER — APPOINTMENT (OUTPATIENT)
Dept: WOUND CARE | Facility: CLINIC | Age: 78
End: 2018-07-10
Payer: COMMERCIAL

## 2018-07-10 PROCEDURE — 99213 OFFICE O/P EST LOW 20 MIN: CPT

## 2018-07-11 ENCOUNTER — APPOINTMENT (OUTPATIENT)
Dept: PULMONOLOGY | Facility: CLINIC | Age: 78
End: 2018-07-11
Payer: COMMERCIAL

## 2018-07-11 VITALS
RESPIRATION RATE: 17 BRPM | HEIGHT: 73 IN | SYSTOLIC BLOOD PRESSURE: 110 MMHG | BODY MASS INDEX: 38.04 KG/M2 | WEIGHT: 287 LBS | DIASTOLIC BLOOD PRESSURE: 60 MMHG | OXYGEN SATURATION: 94 % | HEART RATE: 87 BPM

## 2018-07-11 PROCEDURE — 99214 OFFICE O/P EST MOD 30 MIN: CPT | Mod: 25

## 2018-07-11 PROCEDURE — 94060 EVALUATION OF WHEEZING: CPT

## 2018-07-11 PROCEDURE — 95012 NITRIC OXIDE EXP GAS DETER: CPT

## 2018-07-13 ENCOUNTER — APPOINTMENT (OUTPATIENT)
Dept: WOUND CARE | Facility: CLINIC | Age: 78
End: 2018-07-13
Payer: COMMERCIAL

## 2018-07-13 ENCOUNTER — APPOINTMENT (OUTPATIENT)
Dept: VASCULAR SURGERY | Facility: CLINIC | Age: 78
End: 2018-07-13
Payer: COMMERCIAL

## 2018-07-13 PROCEDURE — 93970 EXTREMITY STUDY: CPT

## 2018-07-13 PROCEDURE — 29581 APPL MULTLAYER CMPRN SYS LEG: CPT | Mod: RT

## 2018-07-25 ENCOUNTER — APPOINTMENT (OUTPATIENT)
Dept: PULMONOLOGY | Facility: CLINIC | Age: 78
End: 2018-07-25
Payer: COMMERCIAL

## 2018-07-25 VITALS
OXYGEN SATURATION: 93 % | HEART RATE: 85 BPM | DIASTOLIC BLOOD PRESSURE: 70 MMHG | HEIGHT: 73 IN | BODY MASS INDEX: 39.04 KG/M2 | SYSTOLIC BLOOD PRESSURE: 130 MMHG | WEIGHT: 294.53 LBS | RESPIRATION RATE: 17 BRPM

## 2018-07-25 PROCEDURE — 96372 THER/PROPH/DIAG INJ SC/IM: CPT

## 2018-07-25 RX ORDER — OMALIZUMAB 202.5 MG/1.4ML
150 INJECTION, SOLUTION SUBCUTANEOUS
Qty: 45 | Refills: 0 | Status: COMPLETED | OUTPATIENT
Start: 2018-07-25

## 2018-07-25 RX ADMIN — OMALIZUMAB 0 MG: 202.5 INJECTION, SOLUTION SUBCUTANEOUS at 00:00

## 2018-07-27 ENCOUNTER — APPOINTMENT (OUTPATIENT)
Dept: WOUND CARE | Facility: CLINIC | Age: 78
End: 2018-07-27
Payer: COMMERCIAL

## 2018-07-27 VITALS
TEMPERATURE: 97.8 F | DIASTOLIC BLOOD PRESSURE: 76 MMHG | HEIGHT: 72 IN | WEIGHT: 294 LBS | SYSTOLIC BLOOD PRESSURE: 128 MMHG | BODY MASS INDEX: 39.82 KG/M2 | HEART RATE: 82 BPM

## 2018-07-27 PROCEDURE — 29581 APPL MULTLAYER CMPRN SYS LEG: CPT | Mod: RT

## 2018-07-27 RX ORDER — PREDNISONE 10 MG/1
10 TABLET ORAL
Qty: 1 | Refills: 0 | Status: DISCONTINUED | COMMUNITY
Start: 2017-12-21 | End: 2018-07-27

## 2018-07-27 RX ORDER — PREDNISONE 10 MG/1
10 TABLET ORAL
Qty: 1 | Refills: 0 | Status: DISCONTINUED | COMMUNITY
Start: 2017-12-18 | End: 2018-07-27

## 2018-07-27 RX ORDER — PREDNISONE 10 MG/1
10 TABLET ORAL
Qty: 100 | Refills: 0 | Status: DISCONTINUED | COMMUNITY
Start: 2017-05-22 | End: 2018-07-27

## 2018-07-30 ENCOUNTER — APPOINTMENT (OUTPATIENT)
Dept: WOUND CARE | Facility: CLINIC | Age: 78
End: 2018-07-30

## 2018-08-08 ENCOUNTER — APPOINTMENT (OUTPATIENT)
Dept: PULMONOLOGY | Facility: CLINIC | Age: 78
End: 2018-08-08
Payer: COMMERCIAL

## 2018-08-08 VITALS
HEART RATE: 80 BPM | OXYGEN SATURATION: 96 % | RESPIRATION RATE: 17 BRPM | WEIGHT: 294 LBS | SYSTOLIC BLOOD PRESSURE: 150 MMHG | HEIGHT: 73 IN | DIASTOLIC BLOOD PRESSURE: 60 MMHG | BODY MASS INDEX: 38.97 KG/M2

## 2018-08-08 PROCEDURE — 99214 OFFICE O/P EST MOD 30 MIN: CPT | Mod: 25

## 2018-08-08 PROCEDURE — 96372 THER/PROPH/DIAG INJ SC/IM: CPT

## 2018-08-08 RX ORDER — OMALIZUMAB 202.5 MG/1.4ML
150 INJECTION, SOLUTION SUBCUTANEOUS
Qty: 1 | Refills: 0 | Status: COMPLETED | OUTPATIENT
Start: 2018-08-08

## 2018-08-08 RX ADMIN — OMALIZUMAB 0 MG: 202.5 INJECTION, SOLUTION SUBCUTANEOUS at 00:00

## 2018-08-15 ENCOUNTER — APPOINTMENT (OUTPATIENT)
Dept: WOUND CARE | Facility: CLINIC | Age: 78
End: 2018-08-15
Payer: COMMERCIAL

## 2018-08-15 VITALS
RESPIRATION RATE: 18 BRPM | BODY MASS INDEX: 38.97 KG/M2 | DIASTOLIC BLOOD PRESSURE: 73 MMHG | HEIGHT: 73 IN | SYSTOLIC BLOOD PRESSURE: 119 MMHG | HEART RATE: 81 BPM | TEMPERATURE: 97.4 F | WEIGHT: 294 LBS

## 2018-08-15 PROCEDURE — 29581 APPL MULTLAYER CMPRN SYS LEG: CPT | Mod: RT

## 2018-08-22 ENCOUNTER — APPOINTMENT (OUTPATIENT)
Dept: PULMONOLOGY | Facility: CLINIC | Age: 78
End: 2018-08-22
Payer: COMMERCIAL

## 2018-08-22 ENCOUNTER — APPOINTMENT (OUTPATIENT)
Dept: WOUND CARE | Facility: CLINIC | Age: 78
End: 2018-08-22
Payer: COMMERCIAL

## 2018-08-22 VITALS
WEIGHT: 293.21 LBS | OXYGEN SATURATION: 93 % | HEIGHT: 73 IN | BODY MASS INDEX: 38.86 KG/M2 | SYSTOLIC BLOOD PRESSURE: 130 MMHG | DIASTOLIC BLOOD PRESSURE: 76 MMHG | RESPIRATION RATE: 16 BRPM | HEART RATE: 80 BPM

## 2018-08-22 VITALS — DIASTOLIC BLOOD PRESSURE: 64 MMHG | SYSTOLIC BLOOD PRESSURE: 134 MMHG | HEART RATE: 94 BPM

## 2018-08-22 PROCEDURE — 96372 THER/PROPH/DIAG INJ SC/IM: CPT

## 2018-08-22 PROCEDURE — 29581 APPL MULTLAYER CMPRN SYS LEG: CPT | Mod: RT

## 2018-08-22 RX ORDER — OMALIZUMAB 202.5 MG/1.4ML
150 INJECTION, SOLUTION SUBCUTANEOUS
Qty: 45 | Refills: 0 | Status: COMPLETED | OUTPATIENT
Start: 2018-08-22

## 2018-08-22 RX ADMIN — OMALIZUMAB 0 MG: 202.5 INJECTION, SOLUTION SUBCUTANEOUS at 00:00

## 2018-09-05 ENCOUNTER — APPOINTMENT (OUTPATIENT)
Dept: PULMONOLOGY | Facility: CLINIC | Age: 78
End: 2018-09-05
Payer: COMMERCIAL

## 2018-09-05 ENCOUNTER — APPOINTMENT (OUTPATIENT)
Dept: WOUND CARE | Facility: CLINIC | Age: 78
End: 2018-09-05
Payer: COMMERCIAL

## 2018-09-05 PROCEDURE — 96372 THER/PROPH/DIAG INJ SC/IM: CPT

## 2018-09-05 PROCEDURE — 99213 OFFICE O/P EST LOW 20 MIN: CPT

## 2018-09-05 RX ORDER — OMALIZUMAB 202.5 MG/1.4ML
150 INJECTION, SOLUTION SUBCUTANEOUS
Qty: 45 | Refills: 0 | Status: COMPLETED | OUTPATIENT
Start: 2018-09-05

## 2018-09-05 RX ADMIN — OMALIZUMAB 0 MG: 202.5 INJECTION, SOLUTION SUBCUTANEOUS at 00:00

## 2018-09-18 ENCOUNTER — APPOINTMENT (OUTPATIENT)
Dept: VASCULAR SURGERY | Facility: CLINIC | Age: 78
End: 2018-09-18
Payer: COMMERCIAL

## 2018-09-18 PROCEDURE — 36475 ENDOVENOUS RF 1ST VEIN: CPT | Mod: RT

## 2018-09-22 ENCOUNTER — RX RENEWAL (OUTPATIENT)
Age: 78
End: 2018-09-22

## 2018-09-24 ENCOUNTER — APPOINTMENT (OUTPATIENT)
Dept: VASCULAR SURGERY | Facility: CLINIC | Age: 78
End: 2018-09-24
Payer: COMMERCIAL

## 2018-09-24 ENCOUNTER — APPOINTMENT (OUTPATIENT)
Dept: WOUND CARE | Facility: CLINIC | Age: 78
End: 2018-09-24
Payer: COMMERCIAL

## 2018-09-24 PROCEDURE — 11101 BIOPSY SKIN SUBQ&/MUCOUS MEMBRANE EA ADDL LESN: CPT

## 2018-09-24 PROCEDURE — 93971 EXTREMITY STUDY: CPT

## 2018-09-24 PROCEDURE — 11100 BX SKIN SUBCUTANEOUS&/MUCOUS MEMBRANE 1 LESION: CPT

## 2018-09-26 ENCOUNTER — NON-APPOINTMENT (OUTPATIENT)
Age: 78
End: 2018-09-26

## 2018-09-26 ENCOUNTER — MED ADMIN CHARGE (OUTPATIENT)
Age: 78
End: 2018-09-26

## 2018-09-26 ENCOUNTER — APPOINTMENT (OUTPATIENT)
Dept: PULMONOLOGY | Facility: CLINIC | Age: 78
End: 2018-09-26
Payer: COMMERCIAL

## 2018-09-26 VITALS
BODY MASS INDEX: 39.36 KG/M2 | DIASTOLIC BLOOD PRESSURE: 80 MMHG | SYSTOLIC BLOOD PRESSURE: 124 MMHG | HEART RATE: 84 BPM | HEIGHT: 73 IN | OXYGEN SATURATION: 94 % | WEIGHT: 297 LBS

## 2018-09-26 PROCEDURE — 96372 THER/PROPH/DIAG INJ SC/IM: CPT

## 2018-09-26 PROCEDURE — 94010 BREATHING CAPACITY TEST: CPT

## 2018-09-26 PROCEDURE — 99214 OFFICE O/P EST MOD 30 MIN: CPT | Mod: 25

## 2018-09-26 RX ORDER — OMALIZUMAB 202.5 MG/1.4ML
150 INJECTION, SOLUTION SUBCUTANEOUS
Qty: 45 | Refills: 0 | Status: COMPLETED | OUTPATIENT
Start: 2018-09-26

## 2018-09-26 RX ADMIN — OMALIZUMAB 1.5 MG: 202.5 INJECTION, SOLUTION SUBCUTANEOUS at 00:00

## 2018-09-26 NOTE — REASON FOR VISIT
[Follow-Up] : a follow-up visit [FreeTextEntry1] : asthma, abnormal CT, elevated IgE level, KHADRA, postnasal drip, poor balance, snoring, TBM and shortness of breath

## 2018-09-26 NOTE — ASSESSMENT
[FreeTextEntry1] : Mr. Jimenez is a 78 year old male with a history of asthma, HTN, HLD, hypothyroidism, bladder/prostate issue; anxiety/depression, non-smoker, now coming back for pulmonary re-evaluation. He is stable from a pulmonary perspective aside from his weight.\par \par His shortness of breath is felt to be multifactorial due to:\par -severe persistent asthma, controlled \par -poor breathing mechanics\par -emphysema\par -overweight \par -out of shape\par -? CAD \par -? tracheobronchomalacia (50%)\par \par problem 1: severe persistent asthma / COPD / emphysema\par -continue DuoNeb by the nebulizer TID \par -continue Symbicort 160 2 inhalations BID then Qvar 80 at 2 puffs BID\par -Continue Incruse 1 inhalation QD \par -continue to use Singulair 10 mg QHS \par \par \par -Asthma is believed to be caused by inherited (genetic) and environmental factor, but its exact cause is unknown. Asthma may be triggered by allergens, lung infections, or irritants in the air. Asthma triggers are different for each person\par -Inhaler technique reviewed as well as oral hygiene techniques reviewed with patient. Avoidance of cold air, extremes of temperature, rescue inhaler should be used before exercise. Order of medication reviewed with patient. Recommended use of a cool mist humidifier in the bedroom. \par \par problem 2: poor breathing mechanics\par -Proper breathing techniques were reviewed with an emphasis of exhalation. Patient instructed to breath in for 1 second and out for four seconds. Patient was encouraged to not talk while walking. \par \par problem 3: abnormal chest CT/ r/o TBM (50%)\par -c/w mucus plugging\par -recommended to add acapella device to be done multiple times daily \par -follow up chest CT 12/2018\par \par problem 4: allergic rhinitis\par -continue Olopatadine 0.6% 1 sniff each nostril up to twice daily\par -continue Clarinex 5 mg QHS \par -Continue Qnasl 1 sniff/nostril BID\par -Recommending Zaditor 1 eye drop BID\par \par -Environmental measures for allergies were encouraged including mattress and pillow cover, air purifier, and environmental controls.\par \par problem 5: r/o ABPA \par -Based on blood work (not present)\par \par problem 6: obesity \par -recommended to read 'Bright Spots and Landmines' by author Toi Gillette and 'Obesity Code' by Jason Reis \par -Weight loss, exercise, and diet control were discussed and are highly encouraged. Treatment options were given such as, aqua therapy, and contacting a nutritionist. Recommended to use the elliptical, stationary bike, less use of treadmill. Obesity is associated with worsening asthma, shortness of breath, and potential for cardiac disease, diabetes, and other underlying medical conditions.\par \par problem 7: OSAS (stable now)\par -continue CPAP (increased pressure necessary)- compliant\par -Sleep apnea is associated with adverse clinical consequences which an affect most organ systems. Cardiovascular disease risk includes arrhythmias, atrial fibrillation, hypertension, coronary artery disease, and stroke. Metabolic disorders include diabetes type 2, non-alcoholic fatty liver disease. Mood disorder especially depression; and cognitive decline especially in the elderly. Associations with chronic reflux/Mancera’s esophagus some but not all inclusive. \par -Reasons to assess this include arousal consistent with hypopnea; respiratory events most prominent in REM sleep or supine position; therefore sleep staging and body position are important for accurate diagnosis and estimation of AHI. \par \par Problem 8: elevated IgE\par -Receiving Xolair today\par -Xolair is a recombinant DNA- derived humanized IgG1K monoclonal antibody that selectively binds to human immunoglobulin E (IgE). Xolair is produced by a Chinese hamster ovary cell suspension culture in nutrient medium containing the antibiotic gentamicin. Gentamicin is not detectable in the final product. Xolair is a sterile, white, preservative free, lyophilized powder contained in a single use vial that is reconstituted with sterile water for suspension. Side effects include: wheezing, tightness of the chest, trouble breathing, hives, skin rash, feeling anxious or light-headed, fainting, warmth or tingling under skin, or swelling of face, lips, or tongue \par \par problem 9: poor balance\par -balance therapy\par \par problem 10: health maintenance \par -s/p influenza vaccination\par -recommended strep pneumonia vaccines: Prevnar-13 vaccine, followed by Pneumo vaccine 23 one year following\par -recommended early intervention for URIs\par -recommended regular osteoporosis evaluations\par -recommended early dermatological evaluations\par -recommended after the age of 50 to the age of 70, colonoscopy every 5 years \par  \par \par F/U in 3 months\par He is encouraged to call with any changes, concerns, or questions. \par

## 2018-09-26 NOTE — PHYSICAL EXAM
[General Appearance - Well Developed] : well developed [Normal Appearance] : normal appearance [Well Groomed] : well groomed [General Appearance - Well Nourished] : well nourished [No Deformities] : no deformities [General Appearance - In No Acute Distress] : no acute distress [Normal Conjunctiva] : the conjunctiva exhibited no abnormalities [Eyelids - No Xanthelasma] : the eyelids demonstrated no xanthelasmas [Normal Oropharynx] : normal oropharynx [Neck Appearance] : the appearance of the neck was normal [Neck Cervical Mass (___cm)] : no neck mass was observed [Jugular Venous Distention Increased] : there was no jugular-venous distention [Thyroid Diffuse Enlargement] : the thyroid was not enlarged [Thyroid Nodule] : there were no palpable thyroid nodules [Heart Rate And Rhythm] : heart rate and rhythm were normal [Heart Sounds] : normal S1 and S2 [Murmurs] : no murmurs present [Respiration, Rhythm And Depth] : normal respiratory rhythm and effort [Exaggerated Use Of Accessory Muscles For Inspiration] : no accessory muscle use [Auscultation Breath Sounds / Voice Sounds] : lungs were clear to auscultation bilaterally [Abdomen Soft] : soft [Abdomen Tenderness] : non-tender [Abdomen Mass (___ Cm)] : no abdominal mass palpated [Abnormal Walk] : normal gait [Gait - Sufficient For Exercise Testing] : the gait was sufficient for exercise testing [Nail Clubbing] : no clubbing of the fingernails [Cyanosis, Localized] : no localized cyanosis [Petechial Hemorrhages (___cm)] : no petechial hemorrhages [Skin Color & Pigmentation] : normal skin color and pigmentation [] : no rash [No Venous Stasis] : no venous stasis [Skin Lesions] : no skin lesions [No Skin Ulcers] : no skin ulcer [No Xanthoma] : no  xanthoma was observed [Deep Tendon Reflexes (DTR)] : deep tendon reflexes were 2+ and symmetric [Sensation] : the sensory exam was normal to light touch and pinprick [No Focal Deficits] : no focal deficits [Oriented To Time, Place, And Person] : oriented to person, place, and time [Impaired Insight] : insight and judgment were intact [Affect] : the affect was normal [III] : III [1+ Pitting] : 1+  pitting [FreeTextEntry1] : I:E 1:3, clear

## 2018-09-26 NOTE — ADDENDUM
[FreeTextEntry1] : Documented by Laureen Contreras acting as a scribe for Dr. Jason Zeng on 9/26/2018.\par \par All medical record entries made by the Scribe were at my, Dr. Jason Zeng's, direction and personally dictated by me on 9/26/2018. I have reviewed the chart and agree that the record accurately reflects my personal performance of the history, physical exam, assessment and plan. I have also personally directed, reviewed, and agree with the discharge instructions.

## 2018-09-26 NOTE — HISTORY OF PRESENT ILLNESS
[FreeTextEntry1] : Mr. Jimenez is a 78 year old male presenting to the office for a follow up visit for asthma, abnormal CT, elevated IgE level, KHADRA, postnasal drip, poor balance, snoring, TBM and shortness of breath. His chief complaint is his weight.\par -He notes he has been feeling well overall\par -He states he had a veinal ablation on his R leg which is healing\par -he states he has been sleeping well and using his CPAP for 2 4-hour blocks each night\par -he notes his bowels are regular\par -He states he has been having arthralgias in his hands that can last up to a full day\par -He notes he has not been taking any new medications\par -He reports his weight is stable\par -He notes he would like to lose weight \par -He denies chest pain or pressure, diarrhea, constipation, dysphagia, sour taste in mouth, heartbun, reflux,

## 2018-09-26 NOTE — PROCEDURE
[FreeTextEntry1] : PFT-spi reveals moderate restrictive dysfunction with FEV1 of 1.71  ,which is   51% of predicted, normal flow volume loop

## 2018-09-27 ENCOUNTER — MEDICATION RENEWAL (OUTPATIENT)
Age: 78
End: 2018-09-27

## 2018-10-03 ENCOUNTER — APPOINTMENT (OUTPATIENT)
Dept: WOUND CARE | Facility: CLINIC | Age: 78
End: 2018-10-03
Payer: COMMERCIAL

## 2018-10-03 VITALS — DIASTOLIC BLOOD PRESSURE: 73 MMHG | HEART RATE: 85 BPM | TEMPERATURE: 98.2 F | SYSTOLIC BLOOD PRESSURE: 139 MMHG

## 2018-10-03 PROCEDURE — 29581 APPL MULTLAYER CMPRN SYS LEG: CPT | Mod: RT

## 2018-10-09 ENCOUNTER — APPOINTMENT (OUTPATIENT)
Dept: PULMONOLOGY | Facility: CLINIC | Age: 78
End: 2018-10-09
Payer: COMMERCIAL

## 2018-10-09 ENCOUNTER — NON-APPOINTMENT (OUTPATIENT)
Age: 78
End: 2018-10-09

## 2018-10-09 ENCOUNTER — APPOINTMENT (OUTPATIENT)
Dept: DERMATOLOGY | Facility: CLINIC | Age: 78
End: 2018-10-09

## 2018-10-09 VITALS
HEIGHT: 73 IN | WEIGHT: 289 LBS | OXYGEN SATURATION: 95 % | DIASTOLIC BLOOD PRESSURE: 70 MMHG | BODY MASS INDEX: 38.3 KG/M2 | HEART RATE: 82 BPM | SYSTOLIC BLOOD PRESSURE: 130 MMHG | RESPIRATION RATE: 17 BRPM

## 2018-10-09 PROCEDURE — 94010 BREATHING CAPACITY TEST: CPT

## 2018-10-09 PROCEDURE — 99214 OFFICE O/P EST MOD 30 MIN: CPT | Mod: 25

## 2018-10-09 PROCEDURE — 96372 THER/PROPH/DIAG INJ SC/IM: CPT

## 2018-10-09 RX ORDER — OMALIZUMAB 202.5 MG/1.4ML
150 INJECTION, SOLUTION SUBCUTANEOUS
Qty: 1 | Refills: 0 | Status: COMPLETED | OUTPATIENT
Start: 2018-10-09

## 2018-10-09 RX ADMIN — OMALIZUMAB 0 MG: 202.5 INJECTION, SOLUTION SUBCUTANEOUS at 00:00

## 2018-10-09 NOTE — PHYSICAL EXAM
[General Appearance - Well Developed] : well developed [Normal Appearance] : normal appearance [Well Groomed] : well groomed [General Appearance - Well Nourished] : well nourished [No Deformities] : no deformities [General Appearance - In No Acute Distress] : no acute distress [Normal Conjunctiva] : the conjunctiva exhibited no abnormalities [Eyelids - No Xanthelasma] : the eyelids demonstrated no xanthelasmas [Normal Oropharynx] : normal oropharynx [III] : III [Neck Appearance] : the appearance of the neck was normal [Neck Cervical Mass (___cm)] : no neck mass was observed [Jugular Venous Distention Increased] : there was no jugular-venous distention [Thyroid Diffuse Enlargement] : the thyroid was not enlarged [Thyroid Nodule] : there were no palpable thyroid nodules [Heart Rate And Rhythm] : heart rate and rhythm were normal [Heart Sounds] : normal S1 and S2 [Murmurs] : no murmurs present [Respiration, Rhythm And Depth] : normal respiratory rhythm and effort [Exaggerated Use Of Accessory Muscles For Inspiration] : no accessory muscle use [Auscultation Breath Sounds / Voice Sounds] : lungs were clear to auscultation bilaterally [Abdomen Soft] : soft [Abdomen Tenderness] : non-tender [Abdomen Mass (___ Cm)] : no abdominal mass palpated [Abnormal Walk] : normal gait [Gait - Sufficient For Exercise Testing] : the gait was sufficient for exercise testing [Nail Clubbing] : no clubbing of the fingernails [Cyanosis, Localized] : no localized cyanosis [Petechial Hemorrhages (___cm)] : no petechial hemorrhages [1+ Pitting] : 1+  pitting [Skin Color & Pigmentation] : normal skin color and pigmentation [] : no rash [No Venous Stasis] : no venous stasis [Skin Lesions] : no skin lesions [No Skin Ulcers] : no skin ulcer [No Xanthoma] : no  xanthoma was observed [Deep Tendon Reflexes (DTR)] : deep tendon reflexes were 2+ and symmetric [Sensation] : the sensory exam was normal to light touch and pinprick [No Focal Deficits] : no focal deficits [Oriented To Time, Place, And Person] : oriented to person, place, and time [Impaired Insight] : insight and judgment were intact [Affect] : the affect was normal [FreeTextEntry1] : I:E 1:3, expiratory wheezes and rhonchi

## 2018-10-09 NOTE — HISTORY OF PRESENT ILLNESS
[FreeTextEntry1] : Mr. Jimenez is a 78 year old male presenting to the office for a follow up visit for abnormal chest CT, elevated IgE level, severe asthma, KHADRA, postnasal drip, poor balance, snoring and shortness of breath. His chief complaint is difficulty breathing. \par -He notes he started feeling sick about a week ago with a sore throat and is now coughing with yellow sputum\par -He states he has been using his nasal sprays regularly\par -He reports his sleep is good and he has been feeling rested\par -He notes he has lost weight recently, even more so with his current illness\par -He states he has been wearing compression socks\par -He states he has been using his CPAP religiously and his sleep has been great\par -He states he has been using his Acapella and nebulizer at least 3 times daily\par -He notes he is currently on 40mg of prednisone daily\par -He notes he went to his PCP for his current nasal congestion and cough and was placed on Amoxycilin\par -He denies chest pain or pressure, diarrhea, constipation, fevers, chills, sweats, dysphagia, nausea, vomiting, headaches,  heartburn, reflux,

## 2018-10-09 NOTE — ADDENDUM
[FreeTextEntry1] : Documented by Laureen Contreras acting as a scribe for Dr. Jason Zeng on 10/9/2018.\par \par All medical record entries made by the Scribe were at my, Dr. Jason Zeng's, direction and personally dictated by me on 10/9/2018. I have reviewed the chart and agree that the record accurately reflects my personal performance of the history, physical exam, assessment and plan. I have also personally directed, reviewed, and agree with the discharge instructions.

## 2018-10-09 NOTE — REVIEW OF SYSTEMS
[Nasal Congestion] : nasal congestion [As Noted in HPI] : as noted in HPI [Cough] : cough [Sputum] : sputum  [Negative] : Sleep Disorder

## 2018-10-09 NOTE — PROCEDURE
[FreeTextEntry1] : PFT-spi reveals mild to moderate restrictive and moderate obstructive dysfunction with FEV1 of  1.77 ,which is   53% of predicted, normal flow volume loop

## 2018-10-09 NOTE — ASSESSMENT
[FreeTextEntry1] : Mr. Jimenez is a 78 year old male with a history of asthma, HTN, HLD, hypothyroidism, bladder/prostate issue; anxiety/depression, non-smoker, now coming back for pulmonary re-evaluation. He is presenting today in the midst of an acute sinusitis. \par \par His shortness of breath is felt to be multifactorial due to:\par -severe persistent asthma, controlled \par -poor breathing mechanics\par -emphysema\par -overweight \par -out of shape\par -? CAD \par -? tracheobronchomalacia (50%)\par \par problem 1: acute sinusitis\par -add Bactrum DS 1 tablet BID for 2 weeks\par \par problem 2: severe persistent asthma / COPD / emphysema (flair)\par -continue DuoNeb by the nebulizer TID to QiD\par -continue Symbicort 160 2 inhalations BID then Qvar 80 at 2 puffs BID\par -Continue Incruse 1 inhalation QD \par -continue to use Singulair 10 mg QHS \par -continue prednisone 40mg and tapering down to 30mg (pending resolution of current illness)\par \par \par -Asthma is believed to be caused by inherited (genetic) and environmental factor, but its exact cause is unknown. Asthma may be triggered by allergens, lung infections, or irritants in the air. Asthma triggers are different for each person\par -Inhaler technique reviewed as well as oral hygiene techniques reviewed with patient. Avoidance of cold air, extremes of temperature, rescue inhaler should be used before exercise. Order of medication reviewed with patient. Recommended use of a cool mist humidifier in the bedroom. \par \par problem 3: poor breathing mechanics\par -Proper breathing techniques were reviewed with an emphasis of exhalation. Patient instructed to breath in for 1 second and out for four seconds. Patient was encouraged to not talk while walking. \par \par problem 4: abnormal chest CT/ r/o TBM (50%)\par -c/w mucus plugging\par -recommended to add acapella device to be done multiple times daily \par -follow up chest CT 12/2018\par \par problem 5: allergic rhinitis\par -recommended to try Navage nasal system \par -add Astelin 0.15% 1 inhalation each nostril BID \par -continue Clarinex 5 mg QHS \par -Continue Qnasl 1 sniff/nostril BID\par -Recommending Zaditor 1 eye drop BID\par \par -Environmental measures for allergies were encouraged including mattress and pillow cover, air purifier, and environmental controls.\par \par problem 6: r/o ABPA \par -Based on blood work (not present)\par \par problem 7: obesity \par -recommended to read 'Bright Spots and Landmines' by author Toi Gillette and 'Obesity Code' by Jason Reis \par -Weight loss, exercise, and diet control were discussed and are highly encouraged. Treatment options were given such as, aqua therapy, and contacting a nutritionist. Recommended to use the elliptical, stationary bike, less use of treadmill. Obesity is associated with worsening asthma, shortness of breath, and potential for cardiac disease, diabetes, and other underlying medical conditions.\par \par problem 8: OSAS (stable now)\par -continue CPAP (increased pressure necessary)- compliant\par -Sleep apnea is associated with adverse clinical consequences which an affect most organ systems. Cardiovascular disease risk includes arrhythmias, atrial fibrillation, hypertension, coronary artery disease, and stroke. Metabolic disorders include diabetes type 2, non-alcoholic fatty liver disease. Mood disorder especially depression; and cognitive decline especially in the elderly. Associations with chronic reflux/Mancera’s esophagus some but not all inclusive. \par -Reasons to assess this include arousal consistent with hypopnea; respiratory events most prominent in REM sleep or supine position; therefore sleep staging and body position are important for accurate diagnosis and estimation of AHI. \par \par Problem 9: elevated IgE\par -Receiving Xolair today in [facility]\par -Xolair is a recombinant DNA- derived humanized IgG1K monoclonal antibody that selectively binds to human immunoglobulin E (IgE). Xolair is produced by a Chinese hamster ovary cell suspension culture in nutrient medium containing the antibiotic gentamicin. Gentamicin is not detectable in the final product. Xolair is a sterile, white, preservative free, lyophilized powder contained in a single use vial that is reconstituted with sterile water for suspension. Side effects include: wheezing, tightness of the chest, trouble breathing, hives, skin rash, feeling anxious or light-headed, fainting, warmth or tingling under skin, or swelling of face, lips, or tongue \par \par problem 10: poor balance\par -balance therapy\par \par problem 11: health maintenance \par -s/p influenza vaccination\par -recommended strep pneumonia vaccines: Prevnar-13 vaccine, followed by Pneumo vaccine 23 one year following\par -recommended early intervention for URIs\par -recommended regular osteoporosis evaluations\par -recommended early dermatological evaluations\par -recommended after the age of 50 to the age of 70, colonoscopy every 5 years \par  \par \par F/U in 3 months\par He is encouraged to call with any changes, concerns, or questions. \par

## 2018-10-09 NOTE — REASON FOR VISIT
[Follow-Up] : a follow-up visit [FreeTextEntry1] : abnormal chest CT, elevated IgE level, severe asthma, KHADRA, postnasal drip, poor balance, snoring and shortness of breath

## 2018-10-10 ENCOUNTER — APPOINTMENT (OUTPATIENT)
Dept: PULMONOLOGY | Facility: CLINIC | Age: 78
End: 2018-10-10

## 2018-10-16 ENCOUNTER — APPOINTMENT (OUTPATIENT)
Dept: VASCULAR SURGERY | Facility: CLINIC | Age: 78
End: 2018-10-16
Payer: MEDICARE

## 2018-10-16 PROCEDURE — 36475 ENDOVENOUS RF 1ST VEIN: CPT | Mod: LT

## 2018-10-22 ENCOUNTER — APPOINTMENT (OUTPATIENT)
Dept: VASCULAR SURGERY | Facility: CLINIC | Age: 78
End: 2018-10-22
Payer: COMMERCIAL

## 2018-10-22 PROCEDURE — 93971 EXTREMITY STUDY: CPT

## 2018-10-24 ENCOUNTER — APPOINTMENT (OUTPATIENT)
Dept: PULMONOLOGY | Facility: CLINIC | Age: 78
End: 2018-10-24
Payer: COMMERCIAL

## 2018-10-24 VITALS
OXYGEN SATURATION: 94 % | SYSTOLIC BLOOD PRESSURE: 142 MMHG | WEIGHT: 291 LBS | BODY MASS INDEX: 38.57 KG/M2 | RESPIRATION RATE: 17 BRPM | HEIGHT: 73 IN | DIASTOLIC BLOOD PRESSURE: 60 MMHG | HEART RATE: 92 BPM

## 2018-10-24 PROCEDURE — 90662 IIV NO PRSV INCREASED AG IM: CPT

## 2018-10-24 PROCEDURE — 96372 THER/PROPH/DIAG INJ SC/IM: CPT

## 2018-10-24 PROCEDURE — G0008: CPT

## 2018-10-24 RX ORDER — OMALIZUMAB 202.5 MG/1.4ML
150 INJECTION, SOLUTION SUBCUTANEOUS
Qty: 45 | Refills: 0 | Status: COMPLETED | OUTPATIENT
Start: 2018-10-24

## 2018-10-24 RX ADMIN — OMALIZUMAB 0 MG: 202.5 INJECTION, SOLUTION SUBCUTANEOUS at 00:00

## 2018-11-12 ENCOUNTER — APPOINTMENT (OUTPATIENT)
Dept: WOUND CARE | Facility: CLINIC | Age: 78
End: 2018-11-12
Payer: COMMERCIAL

## 2018-11-12 PROCEDURE — 29581 APPL MULTLAYER CMPRN SYS LEG: CPT | Mod: RT

## 2018-11-14 ENCOUNTER — APPOINTMENT (OUTPATIENT)
Dept: PULMONOLOGY | Facility: CLINIC | Age: 78
End: 2018-11-14
Payer: COMMERCIAL

## 2018-11-14 VITALS
DIASTOLIC BLOOD PRESSURE: 70 MMHG | HEART RATE: 96 BPM | OXYGEN SATURATION: 93 % | BODY MASS INDEX: 38.57 KG/M2 | WEIGHT: 291 LBS | HEIGHT: 73 IN | RESPIRATION RATE: 17 BRPM | SYSTOLIC BLOOD PRESSURE: 130 MMHG

## 2018-11-14 PROCEDURE — 96372 THER/PROPH/DIAG INJ SC/IM: CPT

## 2018-11-14 RX ORDER — OMALIZUMAB 202.5 MG/1.4ML
150 INJECTION, SOLUTION SUBCUTANEOUS
Qty: 45 | Refills: 0 | Status: COMPLETED | OUTPATIENT
Start: 2018-11-14

## 2018-11-14 RX ADMIN — OMALIZUMAB 0 MG: 202.5 INJECTION, SOLUTION SUBCUTANEOUS at 00:00

## 2018-11-19 ENCOUNTER — APPOINTMENT (OUTPATIENT)
Dept: WOUND CARE | Facility: CLINIC | Age: 78
End: 2018-11-19
Payer: COMMERCIAL

## 2018-11-19 PROCEDURE — 11042 DBRDMT SUBQ TIS 1ST 20SQCM/<: CPT

## 2018-11-28 ENCOUNTER — APPOINTMENT (OUTPATIENT)
Dept: PULMONOLOGY | Facility: CLINIC | Age: 78
End: 2018-11-28
Payer: COMMERCIAL

## 2018-11-28 VITALS
SYSTOLIC BLOOD PRESSURE: 142 MMHG | BODY MASS INDEX: 38.17 KG/M2 | HEIGHT: 73 IN | DIASTOLIC BLOOD PRESSURE: 80 MMHG | HEART RATE: 104 BPM | OXYGEN SATURATION: 94 % | WEIGHT: 288 LBS

## 2018-11-28 PROCEDURE — 96372 THER/PROPH/DIAG INJ SC/IM: CPT

## 2018-11-28 RX ORDER — OMALIZUMAB 202.5 MG/1.4ML
150 INJECTION, SOLUTION SUBCUTANEOUS
Qty: 45 | Refills: 0 | Status: COMPLETED | OUTPATIENT
Start: 2018-11-28

## 2018-11-28 RX ADMIN — OMALIZUMAB 0 MG: 202.5 INJECTION, SOLUTION SUBCUTANEOUS at 00:00

## 2018-12-03 ENCOUNTER — APPOINTMENT (OUTPATIENT)
Dept: WOUND CARE | Facility: CLINIC | Age: 78
End: 2018-12-03
Payer: COMMERCIAL

## 2018-12-03 VITALS
WEIGHT: 288 LBS | BODY MASS INDEX: 38.17 KG/M2 | HEIGHT: 73 IN | SYSTOLIC BLOOD PRESSURE: 128 MMHG | DIASTOLIC BLOOD PRESSURE: 71 MMHG | HEART RATE: 88 BPM | TEMPERATURE: 98 F

## 2018-12-03 PROCEDURE — 15271 SKIN SUB GRAFT TRNK/ARM/LEG: CPT

## 2018-12-05 ENCOUNTER — RX RENEWAL (OUTPATIENT)
Age: 78
End: 2018-12-05

## 2018-12-12 ENCOUNTER — APPOINTMENT (OUTPATIENT)
Dept: PULMONOLOGY | Facility: CLINIC | Age: 78
End: 2018-12-12
Payer: COMMERCIAL

## 2018-12-12 ENCOUNTER — NON-APPOINTMENT (OUTPATIENT)
Age: 78
End: 2018-12-12

## 2018-12-12 VITALS
SYSTOLIC BLOOD PRESSURE: 130 MMHG | WEIGHT: 289 LBS | RESPIRATION RATE: 16 BRPM | HEART RATE: 91 BPM | OXYGEN SATURATION: 95 % | DIASTOLIC BLOOD PRESSURE: 65 MMHG | HEIGHT: 73 IN | BODY MASS INDEX: 38.3 KG/M2

## 2018-12-12 PROCEDURE — 96372 THER/PROPH/DIAG INJ SC/IM: CPT

## 2018-12-12 PROCEDURE — 99214 OFFICE O/P EST MOD 30 MIN: CPT | Mod: 25

## 2018-12-12 PROCEDURE — 94010 BREATHING CAPACITY TEST: CPT

## 2018-12-12 PROCEDURE — 95012 NITRIC OXIDE EXP GAS DETER: CPT

## 2018-12-12 RX ORDER — OMALIZUMAB 202.5 MG/1.4ML
150 INJECTION, SOLUTION SUBCUTANEOUS
Qty: 45 | Refills: 0 | Status: COMPLETED | OUTPATIENT
Start: 2018-12-12

## 2018-12-12 RX ORDER — SULFAMETHOXAZOLE AND TRIMETHOPRIM 800; 160 MG/1; MG/1
800-160 TABLET ORAL TWICE DAILY
Qty: 28 | Refills: 0 | Status: DISCONTINUED | COMMUNITY
Start: 2018-10-09 | End: 2018-12-12

## 2018-12-12 RX ADMIN — OMALIZUMAB 0 MG: 202.5 INJECTION, SOLUTION SUBCUTANEOUS at 00:00

## 2018-12-12 NOTE — PROCEDURE
[FreeTextEntry1] : PFT- spi reveals severe restrictive  and obstructive dysfunction; FEV1 of  1.33L which is 40% of predicted; normal flow volume loop. \par \par FENO was 19; a normal value being less than 25\par \par Fractional exhaled nitric oxide (FENO) is regarded as a simple, noninvasive method for assessing eosinophilic airway inflammation. Produced by a variety of cells within the lung, nitric oxide (NO) concentrations are generally low in healthy individuals. However, high concentrations of NO appear to be involved in nonspecific host defense mechanisms and chronic inflammatory diseases such as asthma. The American Thoracic Society (ATS) therefore has recommended using FENO to aid in the diagnosis and monitoring of eosinophilic airway inflammation and asthma, and for identifying steroid responsive individuals whose chronic respiratory symptoms may be caused by airway inflammation.\par \par \par

## 2018-12-12 NOTE — PHYSICAL EXAM
[General Appearance - Well Developed] : well developed [Normal Appearance] : normal appearance [Well Groomed] : well groomed [General Appearance - Well Nourished] : well nourished [No Deformities] : no deformities [General Appearance - In No Acute Distress] : no acute distress [Normal Conjunctiva] : the conjunctiva exhibited no abnormalities [Eyelids - No Xanthelasma] : the eyelids demonstrated no xanthelasmas [Normal Oropharynx] : normal oropharynx [III] : III [Neck Appearance] : the appearance of the neck was normal [Neck Cervical Mass (___cm)] : no neck mass was observed [Jugular Venous Distention Increased] : there was no jugular-venous distention [Thyroid Diffuse Enlargement] : the thyroid was not enlarged [Thyroid Nodule] : there were no palpable thyroid nodules [Heart Rate And Rhythm] : heart rate and rhythm were normal [Heart Sounds] : normal S1 and S2 [Respiration, Rhythm And Depth] : normal respiratory rhythm and effort [Exaggerated Use Of Accessory Muscles For Inspiration] : no accessory muscle use [Auscultation Breath Sounds / Voice Sounds] : lungs were clear to auscultation bilaterally [Abdomen Soft] : soft [Abdomen Tenderness] : non-tender [Abdomen Mass (___ Cm)] : no abdominal mass palpated [Abnormal Walk] : normal gait [Gait - Sufficient For Exercise Testing] : the gait was sufficient for exercise testing [Nail Clubbing] : no clubbing of the fingernails [Cyanosis, Localized] : no localized cyanosis [Petechial Hemorrhages (___cm)] : no petechial hemorrhages [1+ Pitting] : 1+  pitting [Skin Color & Pigmentation] : normal skin color and pigmentation [] : no rash [No Venous Stasis] : no venous stasis [Skin Lesions] : no skin lesions [No Skin Ulcers] : no skin ulcer [No Xanthoma] : no  xanthoma was observed [Deep Tendon Reflexes (DTR)] : deep tendon reflexes were 2+ and symmetric [Sensation] : the sensory exam was normal to light touch and pinprick [No Focal Deficits] : no focal deficits [Oriented To Time, Place, And Person] : oriented to person, place, and time [Impaired Insight] : insight and judgment were intact [Affect] : the affect was normal [FreeTextEntry1] : I:E 1:3, distant breath sounds

## 2018-12-12 NOTE — ADDENDUM
[FreeTextEntry1] : Documented by Mima Solis acting as a scribe for Dr. Jason Zeng on 12/12/18.\par \par All medical record entries made by the scribe, Mima Solis, were at my, Dr. Jason Zeng's, direction and personally dictated by me on 12/12/18. I have reviewed the chart and agree that the record accurately reflects my personal performance of the history, physical exam, assessment and plan. I have also personally directed, reviewed, and agree with the discharge instructions.\par

## 2018-12-12 NOTE — ASSESSMENT
[FreeTextEntry1] : Mr. Jimenez is a 78 year old male with a history of asthma, HTN, HLD, hypothyroidism, bladder/prostate issue; anxiety/depression, non-smoker, now coming back for pulmonary re-evaluation. He is presenting today s/p acute sinusitis. - now stable. \par \par His shortness of breath is felt to be multifactorial due to:\par -severe persistent asthma, controlled \par -poor breathing mechanics\par -emphysema\par -overweight \par -out of shape\par -? CAD \par -? tracheobronchomalacia (50%)\par \par problem 1: severe persistent asthma / COPD / emphysema (flair)\par -continue DuoNeb by the nebulizer TID to QiD\par -continue Symbicort 160 2 inhalations BID then Qvar 80 at 2 puffs BID\par -Continue Incruse 1 inhalation QD \par -continue to use Singulair 10 mg QHS \par \par -Asthma is believed to be caused by inherited (genetic) and environmental factor, but its exact cause is unknown. Asthma may be triggered by allergens, lung infections, or irritants in the air. Asthma triggers are different for each person\par -Inhaler technique reviewed as well as oral hygiene techniques reviewed with patient. Avoidance of cold air, extremes of temperature, rescue inhaler should be used before exercise. Order of medication reviewed with patient. Recommended use of a cool mist humidifier in the bedroom. \par \par problem 2: poor breathing mechanics\par -Proper breathing techniques were reviewed with an emphasis of exhalation. Patient instructed to breath in for 1 second and out for four seconds. Patient was encouraged to not talk while walking. \par \par problem 3: abnormal chest CT/ r/o TBM (50%)\par -c/w mucus plugging\par -recommended to add acapella device to be done multiple times daily \par -follow up chest CT 12/2018\par \par problem 4: allergic rhinitis\par -recommended to try Navage nasal system \par -add Astelin 0.15% 1 inhalation each nostril BID \par -continue Clarinex 5 mg QHS \par -Continue Qnasl 1 sniff/nostril BID\par -Recommending Zaditor 1 eye drop BID\par \par -Environmental measures for allergies were encouraged including mattress and pillow cover, air purifier, and environmental controls.\par \par problem 5: r/o ABPA \par -Based on blood work (not present)\par \par problem 6: obesity \par -recommended to read 'Bright Spots and Landmines' by author Toi Gillette and 'Obesity Code' by Jason Reis \par -Weight loss, exercise, and diet control were discussed and are highly encouraged. Treatment options were given such as, aqua therapy, and contacting a nutritionist. Recommended to use the elliptical, stationary bike, less use of treadmill. Obesity is associated with worsening asthma, shortness of breath, and potential for cardiac disease, diabetes, and other underlying medical conditions.\par \par problem 7: OSAS (stable now)\par -continue CPAP (increased pressure necessary)- compliant\par -Sleep apnea is associated with adverse clinical consequences which an affect most organ systems. Cardiovascular disease risk includes arrhythmias, atrial fibrillation, hypertension, coronary artery disease, and stroke. Metabolic disorders include diabetes type 2, non-alcoholic fatty liver disease. Mood disorder especially depression; and cognitive decline especially in the elderly. Associations with chronic reflux/Mancera’s esophagus some but not all inclusive. \par -Reasons to assess this include arousal consistent with hypopnea; respiratory events most prominent in REM sleep or supine position; therefore sleep staging and body position are important for accurate diagnosis and estimation of AHI. \par \par Problem 8: elevated IgE\par -Receiving Xolair today\par -Xolair is a recombinant DNA- derived humanized IgG1K monoclonal antibody that selectively binds to human immunoglobulin E (IgE). Xolair is produced by a Chinese hamster ovary cell suspension culture in nutrient medium containing the antibiotic gentamicin. Gentamicin is not detectable in the final product. Xolair is a sterile, white, preservative free, lyophilized powder contained in a single use vial that is reconstituted with sterile water for suspension. Side effects include: wheezing, tightness of the chest, trouble breathing, hives, skin rash, feeling anxious or light-headed, fainting, warmth or tingling under skin, or swelling of face, lips, or tongue \par \par problem 9: poor balance\par -balance therapy\par \par problem 10: health maintenance \par -s/p influenza vaccination 2018\par -recommended strep pneumonia vaccines: Prevnar-13 vaccine, followed by Pneumo vaccine 23 one year following\par -recommended early intervention for URIs\par -recommended regular osteoporosis evaluations\par -recommended early dermatological evaluations\par -recommended after the age of 50 to the age of 70, colonoscopy every 5 years \par  \par F/U in 3 months\par He is encouraged to call with any changes, concerns, or questions. \par

## 2018-12-17 ENCOUNTER — APPOINTMENT (OUTPATIENT)
Dept: WOUND CARE | Facility: CLINIC | Age: 78
End: 2018-12-17
Payer: COMMERCIAL

## 2018-12-17 PROCEDURE — 15271 SKIN SUB GRAFT TRNK/ARM/LEG: CPT

## 2019-01-07 ENCOUNTER — APPOINTMENT (OUTPATIENT)
Dept: WOUND CARE | Facility: CLINIC | Age: 79
End: 2019-01-07
Payer: COMMERCIAL

## 2019-01-07 PROCEDURE — 15271 SKIN SUB GRAFT TRNK/ARM/LEG: CPT

## 2019-01-09 ENCOUNTER — APPOINTMENT (OUTPATIENT)
Dept: PULMONOLOGY | Facility: CLINIC | Age: 79
End: 2019-01-09
Payer: COMMERCIAL

## 2019-01-09 VITALS
HEART RATE: 85 BPM | SYSTOLIC BLOOD PRESSURE: 138 MMHG | WEIGHT: 289 LBS | OXYGEN SATURATION: 95 % | BODY MASS INDEX: 38.3 KG/M2 | RESPIRATION RATE: 17 BRPM | DIASTOLIC BLOOD PRESSURE: 60 MMHG | HEIGHT: 73 IN

## 2019-01-09 PROCEDURE — 96372 THER/PROPH/DIAG INJ SC/IM: CPT

## 2019-01-09 RX ORDER — OMALIZUMAB 202.5 MG/1.4ML
150 INJECTION, SOLUTION SUBCUTANEOUS
Qty: 45 | Refills: 0 | Status: COMPLETED | OUTPATIENT
Start: 2019-01-09

## 2019-01-09 RX ADMIN — OMALIZUMAB 0 MG: 202.5 INJECTION, SOLUTION SUBCUTANEOUS at 00:00

## 2019-01-15 NOTE — PHYSICAL EXAM
[2+] : right 2+ [Ankle Swelling (On Exam)] : present [Ankle Swelling On The Right] : mild [] : bilaterally [Ankle Swelling Bilaterally] : severe [Alert] : alert [Oriented to Person] : oriented to person [Oriented to Place] : oriented to place [Oriented to Time] : oriented to time [Calm] : calm [4 x 4] : 4 x 4  [JVD] : no jugular venous distention  [Abdomen Tenderness] : ~T ~M No abdominal tenderness [de-identified] : overweight, ambulatory, well groomed [de-identified] : non labored [de-identified] :  ambulatory [FreeTextEntry1] : right lower calf  [FreeTextEntry2] : 5 [FreeTextEntry3] : 2 [FreeTextEntry4] : 0.3 cm [de-identified] : stasis [de-identified] : sub q tissue [de-identified] : apligraf [de-identified] : every other day

## 2019-01-15 NOTE — HISTORY OF PRESENT ILLNESS
[FreeTextEntry1] : Mr. CLAYTON SALAZAR is a 78 year who presents for evaluation of  right lower extremity leg pain and nonhealing right leg ulceration for over one year duration.  .s/p RFA to right LE.   Pt is accompanied by his aide\par  pt stated the wound stared off as a Basal cell which was excised by his dermatologist with clear margins and felt that the wound was not getting any better due to his varicose veins. Pt has no fevers or chills today he complains of pain in the wound. pt is currently wearing compression stocking.  He has been off prednisone\par Patient's leg discomfort is associated with  leg swelling, fatigue, heaviness, achiness. \par Patient's symptoms have persisted despite conservative management with leg elevation, exercise, attempted weight loss, over-the-counter medications.\par Patient's symptoms are aggravated by weight bearing, prolonged standing, prolonged sitting.\par \par 9/5 Haroldo for right leg ablation 9/18\par VN 3x week\par Patient's symptoms are alleviated by  leg elevation, exercise. \par Patient's symptoms interfere with the patient's  ADLs such as work, shopping and housekeeping.  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 \par \par

## 2019-01-15 NOTE — PHYSICAL EXAM
[2+] : right 2+ [Ankle Swelling (On Exam)] : present [Ankle Swelling On The Right] : mild [] : bilaterally [Ankle Swelling Bilaterally] : severe [Alert] : alert [Oriented to Person] : oriented to person [Oriented to Place] : oriented to place [Oriented to Time] : oriented to time [Calm] : calm [4 x 4] : 4 x 4  [JVD] : no jugular venous distention  [Abdomen Tenderness] : ~T ~M No abdominal tenderness [de-identified] : overweight, ambulatory, well groomed [de-identified] : non labored [de-identified] :  ambulatory [FreeTextEntry1] : right lower calf  [FreeTextEntry2] : 5 [FreeTextEntry3] : 2 [FreeTextEntry4] : 0.3 cm [de-identified] : stasis [de-identified] : sub q tissue [de-identified] : apligraf [de-identified] : every other day

## 2019-01-15 NOTE — HISTORY OF PRESENT ILLNESS
[FreeTextEntry1] : Mr. CLAYTON SALAZAR is a 78 year who presents for evaluation of  right lower extremity leg pain and nonhealing right leg ulceration for over one year duration.  .s/p RFA to right LE.   Pt is accompanied by his aide\par  pt stated the wound stared off as a Basal cell which was excised by his dermatologist with clear margins and felt that the wound was not getting any better due to his varicose veins. Pt has no fevers or chills today he complains of pain in the wound. pt is currently wearing compression stocking.  He has been off prednisone\par Patient's leg discomfort is associated with  leg swelling, fatigue, heaviness, achiness. \par Patient's symptoms have persisted despite conservative management with leg elevation, exercise, attempted weight loss, over-the-counter medications.\par Patient's symptoms are aggravated by weight bearing, prolonged standing, prolonged sitting.\par \par 9/5 Haroldo for right leg ablation 9/18\par VN 3x week\par Patient's symptoms are alleviated by  leg elevation, exercise. \par Patient's symptoms interfere with the patient's  ADLs such as work, shopping and housekeeping.  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 \par \par

## 2019-01-15 NOTE — PROCEDURE
[FreeTextEntry1] : radiofrequency ablation of the right small saphenous vein [FreeTextEntry3] : \par \par

## 2019-01-15 NOTE — ASSESSMENT
[FreeTextEntry1] : Mr. CLAYTON SALAZAR is a 78 year with persistent and worsening bilateral  lower extremity venous insufficiency, CEAP classification C6 which compression therapy has been applied.  He had performed leg elevation, exercise and over the counter pain medications.  Patient has complaints of worsening right LE>LLE leg discomfort with swelling and nonhealing ulcer. The patient’s symptoms interfere with their ADL’s, such as walking, shopping and house work, and their ability to work and exercise. Patient has intact pulses in both legs without evidence of arterial insufficiency.  \par \par Pt discussed traveling for the holiday from 12/20-1/5/19\par Apligraf applied today to patient’s posterior right leg   Wound bed debrided of bioburden and prepped for product application. 1 of pieces and original size of product obtained.  Total product usage was 10 sq. cm, Total waste 34 sq. cm) due to wound size.  Product secured with steri strips and bolster dressing.\par Patient to f/u in 1 week.\par \par \par \par \par \par homecare orders given\par 27cm ankle\par 43cm calf\par 39cm length\par

## 2019-01-23 ENCOUNTER — APPOINTMENT (OUTPATIENT)
Dept: PULMONOLOGY | Facility: CLINIC | Age: 79
End: 2019-01-23
Payer: COMMERCIAL

## 2019-01-23 ENCOUNTER — NON-APPOINTMENT (OUTPATIENT)
Age: 79
End: 2019-01-23

## 2019-01-23 VITALS
SYSTOLIC BLOOD PRESSURE: 140 MMHG | HEART RATE: 88 BPM | BODY MASS INDEX: 37.77 KG/M2 | WEIGHT: 285 LBS | RESPIRATION RATE: 16 BRPM | DIASTOLIC BLOOD PRESSURE: 70 MMHG | OXYGEN SATURATION: 95 % | HEIGHT: 73 IN

## 2019-01-23 PROCEDURE — 94010 BREATHING CAPACITY TEST: CPT

## 2019-01-23 PROCEDURE — 96372 THER/PROPH/DIAG INJ SC/IM: CPT

## 2019-01-23 PROCEDURE — 99214 OFFICE O/P EST MOD 30 MIN: CPT | Mod: 25

## 2019-01-23 RX ORDER — OMALIZUMAB 202.5 MG/1.4ML
150 INJECTION, SOLUTION SUBCUTANEOUS
Qty: 45 | Refills: 0 | Status: COMPLETED | OUTPATIENT
Start: 2019-01-23

## 2019-01-23 RX ADMIN — OMALIZUMAB 0 MG: 202.5 INJECTION, SOLUTION SUBCUTANEOUS at 00:00

## 2019-01-23 NOTE — ASSESSMENT
[FreeTextEntry1] : Mr. Jimenez is a 78 year old male with a history of asthma, HTN, HLD, hypothyroidism, bladder/prostate issue; anxiety/depression, non-smoker, now coming back for pulmonary re-evaluation. He is presenting today in the midst of pharyngitis. \par \par His shortness of breath is felt to be multifactorial due to:\par -severe persistent asthma, controlled \par -poor breathing mechanics\par -emphysema\par -overweight \par -out of shape\par -? CAD \par -? tracheobronchomalacia (50%)\par \par problem: Acute pharyngitis \par -recommended [place] cold and sinus \par -recommended [employer] lozenges \par \par problem 1: severe persistent asthma / COPD / emphysema (flair)\par -continue DuoNeb by the nebulizer TID to QiD\par -continue Symbicort 160 2 inhalations BID then Qvar 80 at 2 puffs BID\par -Continue Incruse 1 inhalation QD \par -continue to use Singulair 10 mg QHS \par \par -Asthma is believed to be caused by inherited (genetic) and environmental factor, but its exact cause is unknown. Asthma may be triggered by allergens, lung infections, or irritants in the air. Asthma triggers are different for each person\par -Inhaler technique reviewed as well as oral hygiene techniques reviewed with patient. Avoidance of cold air, extremes of temperature, rescue inhaler should be used before exercise. Order of medication reviewed with patient. Recommended use of a cool mist humidifier in the bedroom. \par \par problem 2: poor breathing mechanics\par -Proper breathing techniques were reviewed with an emphasis of exhalation. Patient instructed to breath in for 1 second and out for four seconds. Patient was encouraged to not talk while walking. \par \par problem 3: abnormal chest CT/ r/o TBM (50%)\par -c/w mucus plugging\par -recommended to add acapella device to be done multiple times daily \par -follow up chest CT due now. \par \par problem 4: allergic rhinitis\par -recommended to try Navage nasal system \par -add Astelin 0.15% 1 inhalation each nostril BID \par -continue Clarinex 5 mg QHS \par -Continue Qnasl 1 sniff/nostril BID\par -Recommending Zaditor 1 eye drop BID\par \par -Environmental measures for allergies were encouraged including mattress and pillow cover, air purifier, and environmental controls.\par \par problem 5: r/o ABPA \par -Based on blood work (not present)\par \par problem 6: obesity \par -recommended to read 'Bright Spots and Landmines' by author Toi Gillette and 'Obesity Code' by Jason Reis \par -Weight loss, exercise, and diet control were discussed and are highly encouraged. Treatment options were given such as, aqua therapy, and contacting a nutritionist. Recommended to use the elliptical, stationary bike, less use of treadmill. Obesity is associated with worsening asthma, shortness of breath, and potential for cardiac disease, diabetes, and other underlying medical conditions.\par \par problem 7: OSAS (stable now)\par -continue CPAP (increased pressure necessary)- compliant\par -Sleep apnea is associated with adverse clinical consequences which an affect most organ systems. Cardiovascular disease risk includes arrhythmias, atrial fibrillation, hypertension, coronary artery disease, and stroke. Metabolic disorders include diabetes type 2, non-alcoholic fatty liver disease. Mood disorder especially depression; and cognitive decline especially in the elderly. Associations with chronic reflux/Mancera’s esophagus some but not all inclusive. \par -Reasons to assess this include arousal consistent with hypopnea; respiratory events most prominent in REM sleep or supine position; therefore sleep staging and body position are important for accurate diagnosis and estimation of AHI. \par \par Problem 8: elevated IgE\par -Received Xolair today in E\par -Xolair is a recombinant DNA- derived humanized IgG1K monoclonal antibody that selectively binds to human immunoglobulin E (IgE). Xolair is produced by a Chinese hamster ovary cell suspension culture in nutrient medium containing the antibiotic gentamicin. Gentamicin is not detectable in the final product. Xolair is a sterile, white, preservative free, lyophilized powder contained in a single use vial that is reconstituted with sterile water for suspension. Side effects include: wheezing, tightness of the chest, trouble breathing, hives, skin rash, feeling anxious or light-headed, fainting, warmth or tingling under skin, or swelling of face, lips, or tongue \par \par problem 9: poor balance\par -balance therapy\par \par problem 10: health maintenance \par -s/p influenza vaccination 2018\par -recommended strep pneumonia vaccines: Prevnar-13 vaccine, followed by Pneumo vaccine 23 one year following\par -recommended early intervention for URIs\par -recommended regular osteoporosis evaluations\par -recommended early dermatological evaluations\par -recommended after the age of 50 to the age of 70, colonoscopy every 5 years \par  \par F/U in 3 months\par He is encouraged to call with any changes, concerns, or questions. \par

## 2019-01-23 NOTE — PHYSICAL EXAM
[General Appearance - Well Developed] : well developed [Normal Appearance] : normal appearance [Well Groomed] : well groomed [General Appearance - Well Nourished] : well nourished [No Deformities] : no deformities [General Appearance - In No Acute Distress] : no acute distress [Normal Conjunctiva] : the conjunctiva exhibited no abnormalities [Eyelids - No Xanthelasma] : the eyelids demonstrated no xanthelasmas [Normal Oropharynx] : normal oropharynx [III] : III [Neck Appearance] : the appearance of the neck was normal [Neck Cervical Mass (___cm)] : no neck mass was observed [Jugular Venous Distention Increased] : there was no jugular-venous distention [Thyroid Diffuse Enlargement] : the thyroid was not enlarged [Thyroid Nodule] : there were no palpable thyroid nodules [Heart Rate And Rhythm] : heart rate and rhythm were normal [Heart Sounds] : normal S1 and S2 [Respiration, Rhythm And Depth] : normal respiratory rhythm and effort [Exaggerated Use Of Accessory Muscles For Inspiration] : no accessory muscle use [Auscultation Breath Sounds / Voice Sounds] : lungs were clear to auscultation bilaterally [Abdomen Soft] : soft [Abdomen Tenderness] : non-tender [Abdomen Mass (___ Cm)] : no abdominal mass palpated [Abnormal Walk] : normal gait [Gait - Sufficient For Exercise Testing] : the gait was sufficient for exercise testing [Nail Clubbing] : no clubbing of the fingernails [Cyanosis, Localized] : no localized cyanosis [Petechial Hemorrhages (___cm)] : no petechial hemorrhages [1+ Pitting] : 1+  pitting [Skin Color & Pigmentation] : normal skin color and pigmentation [] : no rash [No Venous Stasis] : no venous stasis [Skin Lesions] : no skin lesions [No Skin Ulcers] : no skin ulcer [No Xanthoma] : no  xanthoma was observed [Deep Tendon Reflexes (DTR)] : deep tendon reflexes were 2+ and symmetric [Sensation] : the sensory exam was normal to light touch and pinprick [No Focal Deficits] : no focal deficits [Oriented To Time, Place, And Person] : oriented to person, place, and time [Impaired Insight] : insight and judgment were intact [Affect] : the affect was normal [FreeTextEntry1] : I:E 1:3, clear

## 2019-01-23 NOTE — HISTORY OF PRESENT ILLNESS
[FreeTextEntry1] : Mr. Jimenez is a 78 year old male presenting to the office for a follow up visit for abnormal chest CT, elevated IgE level, severe asthma, KHADRA, postnasal drip, poor balance, snoring and shortness of breath. His chief complaint is dry throat with difficulty swallowing. \par -He notes he feels well. \par -He notes coughing and wheezing has been well controlled. Exacerbated by walking for a long period of time. Cold air worsens it. \par -He notes he has lost weight recently with diet. \par -He states he has been wearing compression socks\par -He states he has been using his CPAP religiously and his sleep has been great, getting 4 hour blocks of sleep. \par -He notes he is able to ambulate short distance without his cane, for long distance he uses the cane. \par -He shares the infection on the leg has not resolved yet. \par -He states he has been using his Acapella and nebulizer at least 3 times daily\par -He notes itchy eyes and clear fluids from the eyes. \par -He denies hoarseness, chest pain or pressure, diarrhea, constipation, fevers, chills, sweats, dysphagia, nausea, vomiting, headaches,  heartburn, reflux,

## 2019-01-23 NOTE — PROCEDURE
[FreeTextEntry1] : PFT- spi reveals moderate restrictive and severe obstructive dysfunction; FEV1 of  1.22L which is 37% of predicted; normal flow volume loop. \par \par

## 2019-01-23 NOTE — ADDENDUM
[FreeTextEntry1] : Documented by Mima Solis acting as a scribe for Dr. Jason Zeng on 1/23/19.\par \par All medical record entries made by the scribe, Mima Solis, were at my, Dr. Jason Zeng's, direction and personally dictated by me on 1/23/19. I have reviewed the chart and agree that the record accurately reflects my personal performance of the history, physical exam, assessment and plan. I have also personally directed, reviewed, and agree with the discharge instructions.\par

## 2019-01-25 ENCOUNTER — APPOINTMENT (OUTPATIENT)
Dept: WOUND CARE | Facility: CLINIC | Age: 79
End: 2019-01-25
Payer: COMMERCIAL

## 2019-01-25 PROCEDURE — 29581 APPL MULTLAYER CMPRN SYS LEG: CPT | Mod: RT

## 2019-01-25 NOTE — HISTORY OF PRESENT ILLNESS
[FreeTextEntry1] : Mr. CLAYTON SALAZAR is a 78 year who presents for evaluation of  right lower extremity leg pain and nonhealing right leg ulceration for over one year duration.  .s/p RFA to right LE.   Pt is accompanied by his aide\par  pt stated the wound stared off as a Basal cell which was excised by his dermatologist with clear margins and felt that the wound was not getting any better due to his varicose veins. Pt has no fevers or chills today he complains of pain in the wound. pt is currently wearing compression stocking.  He has been off prednisone\par Patient's leg discomfort is associated with  leg swelling, fatigue, heaviness, achiness. \par Patient's symptoms have persisted despite conservative management with leg elevation, exercise, attempted weight loss, over-the-counter medications.\par Patient's symptoms are aggravated by weight bearing, prolonged standing, prolonged sitting.\par \par 9/5 Haroldo for right leg ablation 9/18\par VN 3x week\par Patient's symptoms are alleviated by  leg elevation, exercise. \par Patient's symptoms interfere with the patient's  ADLs such as work, shopping and housekeeping.  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 \par \par

## 2019-01-25 NOTE — ASSESSMENT
[FreeTextEntry1] : Mr. CLAYTON SALAZAR is a 78 year with persistent and worsening bilateral  lower extremity venous insufficiency, CEAP classification C6 which compression therapy has been applied.  He had performed leg elevation, exercise and over the counter pain medications.  Patient has complaints of worsening right LE>LLE leg discomfort with swelling and nonhealing ulcer. The patient’s symptoms interfere with their ADL’s, such as walking, shopping and house work, and their ability to work and exercise. Patient has intact pulses in both legs without evidence of arterial insufficiency.  \par \par Pt discussed traveling for the holiday from 12/20-1/5/19\par Apligraf applied today to patient’s posterior right leg   Wound bed debrided of bioburden and prepped for product application. 1 of pieces and original size of product obtained.  Total product usage was 10 sq. cm, Total waste 34 sq. cm) due to wound size.  Product secured with steri strips and bolster dressing.\par Patient to f/u in 1 week.\par \par homecare orders given\par 27cm ankle\par 43cm calf\par 39cm length\par

## 2019-01-25 NOTE — PHYSICAL EXAM
[2+] : right 2+ [Ankle Swelling (On Exam)] : present [Ankle Swelling On The Right] : mild [] : bilaterally [Ankle Swelling Bilaterally] : severe [Alert] : alert [Oriented to Person] : oriented to person [Oriented to Place] : oriented to place [Oriented to Time] : oriented to time [Calm] : calm [4 x 4] : 4 x 4  [JVD] : no jugular venous distention  [Abdomen Tenderness] : ~T ~M No abdominal tenderness [de-identified] : overweight, ambulatory, well groomed [de-identified] : non labored [de-identified] :  ambulatory [FreeTextEntry1] : right lower calf  [FreeTextEntry2] : 2 cm  [FreeTextEntry3] : 2.3 cm  [FreeTextEntry4] : 0.1 cm [de-identified] : nataliaflex [de-identified] : every other day

## 2019-02-06 ENCOUNTER — APPOINTMENT (OUTPATIENT)
Dept: PULMONOLOGY | Facility: CLINIC | Age: 79
End: 2019-02-06
Payer: COMMERCIAL

## 2019-02-06 VITALS
BODY MASS INDEX: 17.51 KG/M2 | DIASTOLIC BLOOD PRESSURE: 80 MMHG | HEART RATE: 82 BPM | SYSTOLIC BLOOD PRESSURE: 160 MMHG | OXYGEN SATURATION: 96 % | WEIGHT: 132.1 LBS | HEIGHT: 73 IN | RESPIRATION RATE: 17 BRPM

## 2019-02-06 PROCEDURE — 96372 THER/PROPH/DIAG INJ SC/IM: CPT

## 2019-02-06 RX ORDER — OMALIZUMAB 202.5 MG/1.4ML
150 INJECTION, SOLUTION SUBCUTANEOUS
Qty: 45 | Refills: 0 | Status: COMPLETED | OUTPATIENT
Start: 2019-02-06

## 2019-02-06 RX ADMIN — OMALIZUMAB 0 MG: 202.5 INJECTION, SOLUTION SUBCUTANEOUS at 00:00

## 2019-02-27 ENCOUNTER — MEDICATION RENEWAL (OUTPATIENT)
Age: 79
End: 2019-02-27

## 2019-02-27 ENCOUNTER — APPOINTMENT (OUTPATIENT)
Dept: PULMONOLOGY | Facility: CLINIC | Age: 79
End: 2019-02-27
Payer: COMMERCIAL

## 2019-02-27 VITALS
HEART RATE: 85 BPM | OXYGEN SATURATION: 96 % | RESPIRATION RATE: 17 BRPM | BODY MASS INDEX: 24.39 KG/M2 | WEIGHT: 184 LBS | DIASTOLIC BLOOD PRESSURE: 80 MMHG | HEIGHT: 73 IN | SYSTOLIC BLOOD PRESSURE: 142 MMHG

## 2019-02-27 PROCEDURE — 96372 THER/PROPH/DIAG INJ SC/IM: CPT

## 2019-02-27 RX ORDER — OMALIZUMAB 202.5 MG/1.4ML
150 INJECTION, SOLUTION SUBCUTANEOUS
Qty: 45 | Refills: 0 | Status: COMPLETED | OUTPATIENT
Start: 2019-02-27

## 2019-03-01 ENCOUNTER — APPOINTMENT (OUTPATIENT)
Dept: WOUND CARE | Facility: CLINIC | Age: 79
End: 2019-03-01
Payer: COMMERCIAL

## 2019-03-01 PROCEDURE — 99213 OFFICE O/P EST LOW 20 MIN: CPT

## 2019-03-01 NOTE — HISTORY OF PRESENT ILLNESS
[FreeTextEntry1] : Reports that all wounds have healed\par Using compression hose\par \par Precautions given\par S/P bilat RFA

## 2019-03-01 NOTE — PHYSICAL EXAM
[JVD] : no jugular venous distention  [Ankle Swelling (On Exam)] : present [Ankle Swelling Bilaterally] : bilaterally  [] : bilaterally [Ankle Swelling On The Left] : moderate [Alert] : alert [de-identified] : NAD, overweight [de-identified] : Non labored [FreeTextEntry1] : No overt ischemia either leg or foot [de-identified] : ambulatory

## 2019-03-01 NOTE — ASSESSMENT
[FreeTextEntry1] : wounds of left lower leg are closed , although both legs are edematous\par Will use Cavilon to cover superficial eschars , and wear compression hose, which he did not wear to office today\par Reports that he is not using diuretics

## 2019-03-13 ENCOUNTER — APPOINTMENT (OUTPATIENT)
Dept: WOUND CARE | Facility: CLINIC | Age: 79
End: 2019-03-13
Payer: COMMERCIAL

## 2019-03-13 VITALS
BODY MASS INDEX: 24.52 KG/M2 | HEIGHT: 73 IN | TEMPERATURE: 98.1 F | DIASTOLIC BLOOD PRESSURE: 80 MMHG | WEIGHT: 185 LBS | HEART RATE: 94 BPM | SYSTOLIC BLOOD PRESSURE: 141 MMHG

## 2019-03-13 DIAGNOSIS — S81.801A UNSPECIFIED OPEN WOUND, RIGHT LOWER LEG, INITIAL ENCOUNTER: ICD-10-CM

## 2019-03-13 PROCEDURE — 11042 DBRDMT SUBQ TIS 1ST 20SQCM/<: CPT

## 2019-03-20 ENCOUNTER — APPOINTMENT (OUTPATIENT)
Dept: PULMONOLOGY | Facility: CLINIC | Age: 79
End: 2019-03-20
Payer: COMMERCIAL

## 2019-03-20 ENCOUNTER — APPOINTMENT (OUTPATIENT)
Dept: WOUND CARE | Facility: CLINIC | Age: 79
End: 2019-03-20
Payer: COMMERCIAL

## 2019-03-20 VITALS
HEART RATE: 83 BPM | RESPIRATION RATE: 17 BRPM | HEIGHT: 73 IN | DIASTOLIC BLOOD PRESSURE: 78 MMHG | WEIGHT: 280 LBS | OXYGEN SATURATION: 96 % | SYSTOLIC BLOOD PRESSURE: 126 MMHG | BODY MASS INDEX: 37.11 KG/M2

## 2019-03-20 VITALS
RESPIRATION RATE: 16 BRPM | SYSTOLIC BLOOD PRESSURE: 148 MMHG | HEART RATE: 86 BPM | TEMPERATURE: 98.6 F | DIASTOLIC BLOOD PRESSURE: 82 MMHG

## 2019-03-20 PROCEDURE — 96372 THER/PROPH/DIAG INJ SC/IM: CPT

## 2019-03-20 PROCEDURE — 29581 APPL MULTLAYER CMPRN SYS LEG: CPT | Mod: RT

## 2019-03-20 RX ORDER — OMALIZUMAB 150 MG/ML
150 INJECTION, SOLUTION SUBCUTANEOUS
Qty: 0 | Refills: 0 | Status: COMPLETED | OUTPATIENT
Start: 2019-03-20

## 2019-03-20 RX ORDER — OMALIZUMAB 75 MG/.5ML
75 INJECTION, SOLUTION SUBCUTANEOUS
Qty: 0 | Refills: 0 | Status: COMPLETED | OUTPATIENT
Start: 2019-03-20

## 2019-03-20 RX ADMIN — OMALIZUMAB 75 MG/0.5ML: 75 INJECTION, SOLUTION SUBCUTANEOUS at 00:00

## 2019-03-20 RX ADMIN — OMALIZUMAB 150 MG/ML: 150 INJECTION, SOLUTION SUBCUTANEOUS at 00:00

## 2019-03-20 NOTE — PHYSICAL EXAM
[2+] : right 2+ [Ankle Swelling (On Exam)] : present [Ankle Swelling On The Right] : mild [Ankle Swelling Bilaterally] : severe [] : bilaterally [Alert] : alert [Oriented to Time] : oriented to time [Oriented to Person] : oriented to person [Oriented to Place] : oriented to place [Calm] : calm [4 x 4] : 4 x 4  [JVD] : no jugular venous distention  [Abdomen Tenderness] : ~T ~M No abdominal tenderness [de-identified] : overweight, ambulatory, well groomed [de-identified] : non labored [de-identified] :  ambulatory [FreeTextEntry1] : right lower calf  [FreeTextEntry2] : 1.5 cm  [FreeTextEntry3] : 1.2  cm  [FreeTextEntry4] : 0.2 cm [de-identified] : erythema to the legs [de-identified] : foam [de-identified] : skin and subcutaneous tissue [de-identified] : 1.8 x 1.6 x 0.1cm predebridement [de-identified] : every other day

## 2019-03-20 NOTE — HISTORY OF PRESENT ILLNESS
[FreeTextEntry1] : Mr. CLAYTON SALAZAR is a 78 year who presents for evaluation of  right lower extremity leg pain and nonhealing right leg ulceration for over one year duration.  .s/p RFA to right LE.   patient states that he had recurrent ulcer after the wound had healed when he used Viagra and Cialis together.  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 Pt has no fevers or chills today he complains of pain in the wound. pt is currently wearing compression stocking.  He has been off prednisone\par Patient's leg discomfort is associated with  leg swelling, fatigue, heaviness, achiness. \par Patient's symptoms have persisted despite conservative management with leg elevation, exercise, attempted weight loss, over-the-counter medications.\par Patient's symptoms are aggravated by weight bearing, prolonged standing, prolonged sitting.\par \par 9/5 [facility] for right leg ablation 9/18\par VN 3x week\par Patient's symptoms are alleviated by  leg elevation, exercise. \par Patient's symptoms interfere with the patient's  ADLs such as work, shopping and housekeeping.  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 \par \par

## 2019-03-20 NOTE — ASSESSMENT
[FreeTextEntry1] : Mr. CLAYTON SALAZAR is a 78 year with persistent and worsening bilateral  lower extremity venous insufficiency, CEAP classification C6 which compression therapy has been applied.  He had performed leg elevation, exercise and over the counter pain medications.  Patient has complaints of worsening right LE>LLE leg discomfort with swelling and nonhealing ulcer. The patient’s symptoms interfere with their ADL’s, such as walking, shopping and house work, and their ability to work and exercise. Patient has intact pulses in both legs without evidence of arterial insufficiency.  \par No clinical sign of infection\par \par Patient to f/u in 1 week.\par \par homecare orders given\par 27cm ankle\par 43cm calf\par 39cm length\par

## 2019-03-23 NOTE — HISTORY OF PRESENT ILLNESS
[FreeTextEntry1] : Mr. CLAYTON SALAZAR is a 78 year who presents for evaluation of  right lower extremity leg pain and nonhealing right leg ulceration for over one year duration.  .s/p RFA to right LE.   patient states that he had recurrent ulcer after the wound had healed when he used Viagra and Cialis together.  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 Pt has no fevers or chills today he complains of pain in the wound. pt is currently wearing compression stocking.  He has been off prednisone\par Patient's leg discomfort is associated with  leg swelling, fatigue, heaviness, achiness. \par Patient's symptoms have persisted despite conservative management with leg elevation, exercise, attempted weight loss, over-the-counter medications.\par Patient's symptoms are aggravated by weight bearing, prolonged standing, prolonged sitting.\par \par 9/5 [facility] for right leg ablation 9/18\par VN 3x week\par Patient's symptoms are alleviated by  leg elevation, exercise. \par Patient's symptoms interfere with the patient's  ADLs such as work, shopping and housekeeping.  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 \par \par

## 2019-03-23 NOTE — PHYSICAL EXAM
[2+] : right 2+ [Ankle Swelling (On Exam)] : present [Ankle Swelling On The Right] : mild [] : bilaterally [Ankle Swelling Bilaterally] : severe [Alert] : alert [Oriented to Person] : oriented to person [Oriented to Place] : oriented to place [Oriented to Time] : oriented to time [Calm] : calm [4 x 4] : 4 x 4  [JVD] : no jugular venous distention  [Abdomen Tenderness] : ~T ~M No abdominal tenderness [de-identified] : overweight, ambulatory, well groomed [de-identified] : non labored [de-identified] :  ambulatory [FreeTextEntry1] : right lower calf  [FreeTextEntry2] : 1.8 cm  [FreeTextEntry3] : 1.6  cm  [FreeTextEntry4] : 0.2 cm [de-identified] : erythema to the legs [de-identified] : skin and subcutaneous tissue [de-identified] : foam [de-identified] : every other day [de-identified] : 1.8 x 1.6 x 0.1cm predebridement

## 2019-03-27 ENCOUNTER — APPOINTMENT (OUTPATIENT)
Dept: WOUND CARE | Facility: CLINIC | Age: 79
End: 2019-03-27
Payer: COMMERCIAL

## 2019-03-27 PROCEDURE — 29581 APPL MULTLAYER CMPRN SYS LEG: CPT | Mod: RT

## 2019-03-27 RX ORDER — TADALAFIL 5 MG/1
5 TABLET ORAL
Qty: 14 | Refills: 0 | Status: ACTIVE | COMMUNITY
Start: 2019-03-04

## 2019-03-27 NOTE — PHYSICAL EXAM
[2+] : right 2+ [Ankle Swelling (On Exam)] : present [Ankle Swelling On The Right] : mild [] : bilaterally [Ankle Swelling Bilaterally] : severe [Alert] : alert [Oriented to Person] : oriented to person [Oriented to Place] : oriented to place [Oriented to Time] : oriented to time [Calm] : calm [4 x 4] : 4 x 4  [JVD] : no jugular venous distention  [Abdomen Tenderness] : ~T ~M No abdominal tenderness [de-identified] : overweight, ambulatory, well groomed [de-identified] : non labored [de-identified] :  ambulatory [FreeTextEntry1] : right lower calf  [FreeTextEntry2] : 1.8 cm  [FreeTextEntry3] :  2 cm  [FreeTextEntry4] : 0.2 cm [de-identified] : erythema to the legs [de-identified] : skin and subcutaneous tissue [de-identified] : foam / aidan  [de-identified] : every other day [de-identified] : 1.5/1.2/0.2

## 2019-03-27 NOTE — HISTORY OF PRESENT ILLNESS
[FreeTextEntry1] : Mr. CLAYTON SALAZAR is a 78 year who presents for evaluation of  right lower extremity leg pain and nonhealing right leg ulceration for over one year duration.  .s/p RFA to right LE.   patient states that he had recurrent ulcer after the wound had healed when he used Viagra and Cialis together.  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 Pt has no fevers or chills today he complains of pain in the wound. pt is currently wearing compression stocking.  He has been off prednisone\par Patient's leg discomfort is associated with  leg swelling, fatigue, heaviness, achiness. \par Patient's symptoms have persisted despite conservative management with leg elevation, exercise, attempted weight loss, over-the-counter medications.\par Patient's symptoms are aggravated by weight bearing, prolonged standing, prolonged sitting.\par \par 9/5 [facility] for right leg ablation 9/18\par VN 3x week\par Patient's symptoms are alleviated by  leg elevation, exercise. \par Patient's symptoms interfere with the patient's  ADLs such as work, shopping and housekeeping.  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 \par \par

## 2019-03-27 NOTE — ASSESSMENT
[FreeTextEntry1] : Mr. CLAYTON SALAZAR is a 78 year with persistent and worsening bilateral  lower extremity venous insufficiency, CEAP classification C6 which compression therapy has been applied.  He had performed leg elevation, exercise and over the counter pain medications.  Patient has complaints of worsening right LE>LLE leg discomfort with swelling and nonhealing ulcer. The patient’s symptoms interfere with their ADL’s, such as walking, shopping and house work, and their ability to work and exercise. Patient has intact pulses in both legs without evidence of arterial insufficiency.  \par \par Presently using [place], foam, coban, wound looks clean, hypergranular, will now apply foam over wound, and unna boot, pt. has a aide aand his wife who will do dressing changes\par Order placed through [facility]\par Skin sub. ordered\par Orders written down\par Encouraged to wear compression stocking on left\par

## 2019-04-03 ENCOUNTER — APPOINTMENT (OUTPATIENT)
Dept: WOUND CARE | Facility: CLINIC | Age: 79
End: 2019-04-03
Payer: COMMERCIAL

## 2019-04-03 ENCOUNTER — APPOINTMENT (OUTPATIENT)
Dept: PULMONOLOGY | Facility: CLINIC | Age: 79
End: 2019-04-03
Payer: COMMERCIAL

## 2019-04-03 VITALS
SYSTOLIC BLOOD PRESSURE: 120 MMHG | BODY MASS INDEX: 37.11 KG/M2 | WEIGHT: 280 LBS | RESPIRATION RATE: 16 BRPM | DIASTOLIC BLOOD PRESSURE: 70 MMHG | HEIGHT: 73 IN | OXYGEN SATURATION: 96 % | HEART RATE: 74 BPM

## 2019-04-03 PROCEDURE — 15271 SKIN SUB GRAFT TRNK/ARM/LEG: CPT

## 2019-04-03 PROCEDURE — 96372 THER/PROPH/DIAG INJ SC/IM: CPT

## 2019-04-03 RX ORDER — OMALIZUMAB 150 MG/ML
150 INJECTION, SOLUTION SUBCUTANEOUS
Qty: 0 | Refills: 0 | Status: COMPLETED | OUTPATIENT
Start: 2019-04-03

## 2019-04-03 RX ORDER — OMALIZUMAB 75 MG/.5ML
75 INJECTION, SOLUTION SUBCUTANEOUS
Qty: 0 | Refills: 0 | Status: COMPLETED | OUTPATIENT
Start: 2019-04-03

## 2019-04-03 RX ADMIN — OMALIZUMAB 150 MG/ML: 150 INJECTION, SOLUTION SUBCUTANEOUS at 00:00

## 2019-04-03 RX ADMIN — OMALIZUMAB 75 MG/0.5ML: 75 INJECTION, SOLUTION SUBCUTANEOUS at 00:00

## 2019-04-03 NOTE — ASSESSMENT
[FreeTextEntry1] : Mr. CLAYTON SALAZAR is a 78 year with persistent and worsening bilateral  lower extremity venous insufficiency, CEAP classification C6 which compression therapy has been applied.  He had performed leg elevation, exercise and over the counter pain medications.  Patient has complaints of worsening right LE>LLE leg discomfort with swelling and nonhealing ulcer. The patient’s symptoms interfere with their ADL’s, such as walking, shopping and house work, and their ability to work and exercise. Patient has intact pulses in both legs without evidence of arterial insufficiency.  \par \par Apligraf number 1 applied today to patient’s right lower calf.   Wound bed debrided of bioburden and prepped for product application.  1 pieces and original size of product obtained.  Total product usage was 4 sq. cm, Total waste 40 sq. cm due to wound size.  Product secured with (steri strips and bolster dressing).\par Patient to f/u in 1 week.\par \par \par Pt.'s wife and aide change dressing, provided with instructions to just change foam, and unna boot\par \par

## 2019-04-03 NOTE — PHYSICAL EXAM
[2+] : right 2+ [Ankle Swelling (On Exam)] : present [Ankle Swelling On The Right] : mild [] : bilaterally [Ankle Swelling Bilaterally] : severe [Alert] : alert [Oriented to Person] : oriented to person [Oriented to Place] : oriented to place [Oriented to Time] : oriented to time [Calm] : calm [4 x 4] : 4 x 4  [JVD] : no jugular venous distention  [Abdomen Tenderness] : ~T ~M No abdominal tenderness [de-identified] : overweight, ambulatory, well groomed [de-identified] : non labored [de-identified] :  ambulatory [FreeTextEntry1] : right lower calf  [FreeTextEntry2] : 2 cm  [FreeTextEntry3] :  2 cm  [FreeTextEntry4] : 0.1cm [de-identified] : erythema to the legs resolving from drug reaction overdose [de-identified] : skin and s [de-identified] : Apligraf applied today [de-identified] : 1.5/1.2/0.2

## 2019-04-10 ENCOUNTER — APPOINTMENT (OUTPATIENT)
Dept: WOUND CARE | Facility: CLINIC | Age: 79
End: 2019-04-10
Payer: COMMERCIAL

## 2019-04-10 VITALS — DIASTOLIC BLOOD PRESSURE: 66 MMHG | SYSTOLIC BLOOD PRESSURE: 124 MMHG | TEMPERATURE: 97.6 F | HEART RATE: 78 BPM

## 2019-04-10 PROCEDURE — 29581 APPL MULTLAYER CMPRN SYS LEG: CPT | Mod: RT

## 2019-04-10 NOTE — ASSESSMENT
[FreeTextEntry1] : Mr. CLAYTON SALAZAR is a 78 year with persistent and worsening bilateral  lower extremity venous insufficiency, CEAP classification C6 which compression therapy has been applied.  He had performed leg elevation, exercise and over the counter pain medications.  Patient has complaints of worsening right LE>LLE leg discomfort with swelling and nonhealing ulcer. The patient’s symptoms interfere with their ADL’s, such as walking, shopping and house work, and their ability to work and exercise. Patient has intact pulses in both legs without evidence of arterial insufficiency.  \par Biopsy to be taken next week of the wound if wound fails to progress.\par \par Apligraf number 1 applied prior to patient’s right lower calf.   Wound bed debrided of bioburden and prepped for product application.  1 pieces and original size of product obtained.  Total product usage was 4 sq. cm, Total waste 40 sq. cm due to wound size.  Product secured with (steri strips and bolster dressing).\par Patient to f/u in 1 week.\par \par \par Pt.'s wife and aide change dressing, provided with instructions to just change foam, and unna boot\par \par

## 2019-04-10 NOTE — PHYSICAL EXAM
[2+] : right 2+ [Ankle Swelling (On Exam)] : present [Ankle Swelling On The Right] : mild [] : bilaterally [Ankle Swelling Bilaterally] : severe [Alert] : alert [Oriented to Place] : oriented to place [Oriented to Person] : oriented to person [Oriented to Time] : oriented to time [Calm] : calm [4 x 4] : 4 x 4  [JVD] : no jugular venous distention  [Abdomen Tenderness] : ~T ~M No abdominal tenderness [de-identified] : overweight, ambulatory, well groomed [de-identified] : non labored [de-identified] :  ambulatory [FreeTextEntry1] : right lower calf  [FreeTextEntry2] : 2 cm [FreeTextEntry3] : 1.8 cm [FreeTextEntry4] : 0.1cm [de-identified] : foam [de-identified] : 1.5/1.2/0.2

## 2019-04-10 NOTE — HISTORY OF PRESENT ILLNESS
[FreeTextEntry1] : Mr. CLAYTON SALAZAR is a 78 year who presents for evaluation of  right lower extremity leg pain and nonhealing right leg ulceration for over one year duration.  .s/p RFA to right LE.   patient states that he had recurrent ulcer after the wound had healed when he used Viagra and Cialis together.  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 Pt has no fevers or chills today he complains of pain in the wound. pt is currently wearing compression stocking.  He has been off prednisone\par Patient's leg discomfort is associated with  leg swelling, fatigue, heaviness, achiness. \par Patient's symptoms have persisted despite conservative management with leg elevation, exercise, attempted weight loss, over-the-counter medications.\par Patient's symptoms are aggravated by weight bearing, prolonged standing, prolonged sitting.\par \par 9/5 [facility] for right leg ablation 9/18\par VN 3x week\par Patient's symptoms are alleviated by  leg elevation, exercise. \par Patient's symptoms interfere with the patient's  ADLs such as work, shopping and housekeeping.  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 \par \par

## 2019-04-17 ENCOUNTER — APPOINTMENT (OUTPATIENT)
Dept: WOUND CARE | Facility: CLINIC | Age: 79
End: 2019-04-17
Payer: COMMERCIAL

## 2019-04-17 ENCOUNTER — RESULT REVIEW (OUTPATIENT)
Age: 79
End: 2019-04-17

## 2019-04-17 ENCOUNTER — APPOINTMENT (OUTPATIENT)
Dept: PULMONOLOGY | Facility: CLINIC | Age: 79
End: 2019-04-17
Payer: COMMERCIAL

## 2019-04-17 VITALS
SYSTOLIC BLOOD PRESSURE: 110 MMHG | RESPIRATION RATE: 17 BRPM | BODY MASS INDEX: 37.72 KG/M2 | HEART RATE: 77 BPM | HEIGHT: 73 IN | DIASTOLIC BLOOD PRESSURE: 64 MMHG | OXYGEN SATURATION: 95 % | WEIGHT: 284.61 LBS

## 2019-04-17 PROCEDURE — 11042 DBRDMT SUBQ TIS 1ST 20SQCM/<: CPT

## 2019-04-17 PROCEDURE — 96372 THER/PROPH/DIAG INJ SC/IM: CPT

## 2019-04-17 RX ORDER — OMALIZUMAB 75 MG/.5ML
75 INJECTION, SOLUTION SUBCUTANEOUS
Qty: 0 | Refills: 0 | Status: COMPLETED | OUTPATIENT
Start: 2019-04-17

## 2019-04-17 RX ORDER — OMALIZUMAB 150 MG/ML
150 INJECTION, SOLUTION SUBCUTANEOUS
Qty: 0 | Refills: 0 | Status: COMPLETED | OUTPATIENT
Start: 2019-04-17

## 2019-04-17 RX ADMIN — OMALIZUMAB 150 MG/ML: 150 INJECTION, SOLUTION SUBCUTANEOUS at 00:00

## 2019-04-17 RX ADMIN — OMALIZUMAB 75 MG/0.5ML: 75 INJECTION, SOLUTION SUBCUTANEOUS at 00:00

## 2019-04-18 NOTE — HISTORY OF PRESENT ILLNESS
[FreeTextEntry1] : Mr. CLAYTON SALAZAR is a 78 year who presents for evaluation of  right lower extremity leg pain and nonhealing right leg ulceration for over one year duration.  .s/p RFA to right LE.   patient states that he had recurrent ulcer after the wound had healed when he used Viagra and Cialis together.  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 Pt has no fevers or chills today he complains of pain in the wound. pt is currently wearing compression stocking.  He has been off prednisone\par Patient's leg discomfort is associated with  leg swelling, fatigue, heaviness, achiness. \par Patient's symptoms have persisted despite conservative management with leg elevation, exercise, attempted weight loss, over-the-counter medications.\par Patient's symptoms are aggravated by weight bearing, prolonged standing, prolonged sitting.\par \par 9/5 [facility] for right leg ablation 9/18\par VN 3x week\par Patient's symptoms are alleviated by  leg elevation, exercise. \par Patient's symptoms interfere with the patient's  ADLs such as work, shopping and housekeeping.  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 \par \par

## 2019-04-18 NOTE — PHYSICAL EXAM
[JVD] : no jugular venous distention  [2+] : right 2+ [Ankle Swelling (On Exam)] : present [Ankle Swelling On The Right] : mild [Ankle Swelling Bilaterally] : severe [] : bilaterally [Abdomen Tenderness] : ~T ~M No abdominal tenderness [Alert] : alert [Oriented to Person] : oriented to person [Oriented to Place] : oriented to place [Oriented to Time] : oriented to time [Calm] : calm [de-identified] : overweight, ambulatory, well groomed [de-identified] : non labored [de-identified] :  ambulatory [4 x 4] : 4 x 4  [FreeTextEntry1] : right lower calf  [FreeTextEntry2] : 2.5 cm [FreeTextEntry3] : 2 cm [FreeTextEntry4] : 0.1cm [de-identified] : Skin and subcutaneous tissue [de-identified] : foam [de-identified] : 2/2/0.1

## 2019-04-18 NOTE — PHYSICAL EXAM
[JVD] : no jugular venous distention  [2+] : right 2+ [Ankle Swelling (On Exam)] : present [Ankle Swelling On The Right] : mild [Abdomen Tenderness] : ~T ~M No abdominal tenderness [] : bilaterally [Ankle Swelling Bilaterally] : severe [Alert] : alert [Oriented to Person] : oriented to person [Oriented to Place] : oriented to place [Oriented to Time] : oriented to time [Calm] : calm [de-identified] : overweight, ambulatory, well groomed [de-identified] : non labored [de-identified] :  ambulatory [4 x 4] : 4 x 4  [FreeTextEntry1] : right lower calf  [FreeTextEntry2] : 2.5 cm [FreeTextEntry3] : 2 cm [FreeTextEntry4] : 0.1cm [de-identified] : Skin and subcutaneous tissue [de-identified] : foam [de-identified] : 2/2/0.1

## 2019-04-18 NOTE — ASSESSMENT
[FreeTextEntry1] : Mr. CLAYTON SALAZAR is a 78 year with persistent and worsening bilateral  lower extremity venous insufficiency, CEAP classification C6 which compression therapy has been applied.  He had performed leg elevation, exercise and over the counter pain medications.  Patient has complaints of worsening right LE>LLE leg discomfort with swelling and nonhealing ulcer. The patient’s symptoms interfere with their ADL’s, such as walking, shopping and house work, and their ability to work and exercise. Patient has intact pulses in both legs without evidence of arterial insufficiency.  \par Biopsy  taken to rule out cancer \par patient has a new dermatologist\par

## 2019-04-18 NOTE — PHYSICAL EXAM
[JVD] : no jugular venous distention  [2+] : right 2+ [Ankle Swelling (On Exam)] : present [Ankle Swelling On The Right] : mild [Abdomen Tenderness] : ~T ~M No abdominal tenderness [] : bilaterally [Ankle Swelling Bilaterally] : severe [Alert] : alert [Oriented to Person] : oriented to person [Oriented to Place] : oriented to place [Oriented to Time] : oriented to time [Calm] : calm [de-identified] : overweight, ambulatory, well groomed [de-identified] : non labored [de-identified] :  ambulatory [4 x 4] : 4 x 4  [FreeTextEntry1] : right lower calf  [FreeTextEntry2] : 2.5 cm [FreeTextEntry3] : 2 cm [FreeTextEntry4] : 0.1cm [de-identified] : Skin and subcutaneous tissue [de-identified] : foam [de-identified] : 2/2/0.1

## 2019-04-18 NOTE — HISTORY OF PRESENT ILLNESS
[FreeTextEntry1] : Mr. CLAYTON SALAZAR is a 78 year who presents for evaluation of  right lower extremity leg pain and nonhealing right leg ulceration for over one year duration.  .s/p RFA to right LE.   patient states that he had recurrent ulcer after the wound had healed when he used Viagra and Cialis together.  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 Pt has no fevers or chills today he complains of pain in the wound. pt is currently wearing compression stocking.  He has been off prednisone\par Patient's leg discomfort is associated with  leg swelling, fatigue, heaviness, achiness. \par Patient's symptoms have persisted despite conservative management with leg elevation, exercise, attempted weight loss, over-the-counter medications.\par Patient's symptoms are aggravated by weight bearing, prolonged standing, prolonged sitting.\par \par 9/5 [facility] for right leg ablation 9/18\par VN 3x week\par Patient's symptoms are alleviated by  leg elevation, exercise. \par Patient's symptoms interfere with the patient's  ADLs such as work, shopping and housekeeping.  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 \par \par

## 2019-04-18 NOTE — HISTORY OF PRESENT ILLNESS
[FreeTextEntry1] : Mr. CLAYTON SALAZAR is a 78 year who presents for evaluation of  right lower extremity leg pain and nonhealing right leg ulceration for over one year duration.  .s/p RFA to right LE.   patient states that he had recurrent ulcer after the wound had healed when he used Viagra and Cialis together.  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 Pt has no fevers or chills today he complains of pain in the wound. pt is currently wearing compression stocking.  He has been off prednisone\par Patient's leg discomfort is associated with  leg swelling, fatigue, heaviness, achiness. \par Patient's symptoms have persisted despite conservative management with leg elevation, exercise, attempted weight loss, over-the-counter medications.\par Patient's symptoms are aggravated by weight bearing, prolonged standing, prolonged sitting.\par \par 9/5 [facility] for right leg ablation 9/18\par VN 3x week\par Patient's symptoms are alleviated by  leg elevation, exercise. \par Patient's symptoms interfere with the patient's  ADLs such as work, shopping and housekeeping.  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 \par \par

## 2019-04-24 ENCOUNTER — APPOINTMENT (OUTPATIENT)
Dept: WOUND CARE | Facility: CLINIC | Age: 79
End: 2019-04-24
Payer: COMMERCIAL

## 2019-04-24 PROCEDURE — 29581 APPL MULTLAYER CMPRN SYS LEG: CPT | Mod: RT

## 2019-04-24 NOTE — HISTORY OF PRESENT ILLNESS
[FreeTextEntry1] : Mr. CLAYTON SALAZAR is a 78 year who presents for evaluation of  right lower extremity leg pain and nonhealing right leg ulceration for over one year duration.  .s/p RFA to right LE.   patient states that he had recurrent ulcer after the wound had healed when he used Viagra and Cialis together.  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 Pt has no fevers or chills today he complains of pain in the wound. pt is currently wearing compression stocking.  He has been off prednisone\par Patient's leg discomfort is associated with  leg swelling, fatigue, heaviness, achiness. \par Patient's symptoms have persisted despite conservative management with leg elevation, exercise, attempted weight loss, over-the-counter medications.\par Patient's symptoms are aggravated by weight bearing, prolonged standing, prolonged sitting.\par \par 9/5 [facility] for right leg ablation 9/18\par VN 3x week\par Patient's symptoms are alleviated by  leg elevation, exercise. \par Patient's symptoms interfere with the patient's  ADLs such as work, shopping and housekeeping.  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 \par \par

## 2019-04-24 NOTE — PHYSICAL EXAM
[JVD] : no jugular venous distention  [2+] : right 2+ [Ankle Swelling (On Exam)] : present [Ankle Swelling On The Right] : of the right ankle [] : present [Abdomen Tenderness] : ~T ~M No abdominal tenderness [Ankle Swelling Bilaterally] : severe [Alert] : alert [Oriented to Place] : oriented to place [Oriented to Person] : oriented to person [Oriented to Time] : oriented to time [Calm] : calm [de-identified] : overweight, ambulatory, well groomed [de-identified] : non labored [de-identified] :  ambulatory [4 x 4] : 4 x 4  [FreeTextEntry2] : 3 cm [FreeTextEntry1] : right lower calf  [FreeTextEntry3] : 3 cm [FreeTextEntry4] : 0.1cm [de-identified] : foam [de-identified] : 2/2/0.1 [FreeTextEntry8] : 1 [FreeTextEntry7] : anterior leg  [FreeTextEntry9] : 1 [de-identified] : 0.2 [de-identified] : foam

## 2019-04-24 NOTE — ASSESSMENT
[FreeTextEntry1] : Mr. CLAYTON SALAZAR is a 78 year with persistent and worsening bilateral  lower extremity venous insufficiency, CEAP classification C6 which compression therapy has been applied.  He had performed leg elevation, exercise and over the counter pain medications.  New ulcer from dressing falling down during the week to the anterior leg.   The patient’s symptoms interfere with their ADL’s, such as walking, shopping and house work, and their ability to work and exercise. Patient has intact pulses in both legs without evidence of arterial insufficiency.  \par Biopsy  taken to rule out cancer.  awaiting path report\par patient declines homecare.  He has aide changing dressing\par wound supplies ordered to  [employer]\par patient has a new dermatologist\par

## 2019-04-26 ENCOUNTER — APPOINTMENT (OUTPATIENT)
Dept: PULMONOLOGY | Facility: CLINIC | Age: 79
End: 2019-04-26

## 2019-05-01 ENCOUNTER — APPOINTMENT (OUTPATIENT)
Dept: WOUND CARE | Facility: CLINIC | Age: 79
End: 2019-05-01
Payer: COMMERCIAL

## 2019-05-01 ENCOUNTER — APPOINTMENT (OUTPATIENT)
Dept: PULMONOLOGY | Facility: CLINIC | Age: 79
End: 2019-05-01
Payer: COMMERCIAL

## 2019-05-01 ENCOUNTER — NON-APPOINTMENT (OUTPATIENT)
Age: 79
End: 2019-05-01

## 2019-05-01 VITALS
DIASTOLIC BLOOD PRESSURE: 80 MMHG | OXYGEN SATURATION: 96 % | WEIGHT: 284 LBS | HEIGHT: 73 IN | BODY MASS INDEX: 37.64 KG/M2 | SYSTOLIC BLOOD PRESSURE: 110 MMHG | HEART RATE: 75 BPM | RESPIRATION RATE: 14 BRPM

## 2019-05-01 PROCEDURE — 94010 BREATHING CAPACITY TEST: CPT

## 2019-05-01 PROCEDURE — 96372 THER/PROPH/DIAG INJ SC/IM: CPT

## 2019-05-01 PROCEDURE — 29581 APPL MULTLAYER CMPRN SYS LEG: CPT | Mod: RT

## 2019-05-01 PROCEDURE — 99214 OFFICE O/P EST MOD 30 MIN: CPT | Mod: 25

## 2019-05-01 RX ORDER — OMALIZUMAB 150 MG/ML
150 INJECTION, SOLUTION SUBCUTANEOUS
Qty: 0 | Refills: 0 | Status: COMPLETED | OUTPATIENT
Start: 2019-05-01

## 2019-05-01 RX ORDER — OMALIZUMAB 75 MG/.5ML
75 INJECTION, SOLUTION SUBCUTANEOUS
Qty: 0 | Refills: 0 | Status: COMPLETED | OUTPATIENT
Start: 2019-05-01

## 2019-05-01 RX ADMIN — OMALIZUMAB 150 MG/ML: 150 INJECTION, SOLUTION SUBCUTANEOUS at 00:00

## 2019-05-01 RX ADMIN — OMALIZUMAB 75 MG/0.5ML: 75 INJECTION, SOLUTION SUBCUTANEOUS at 00:00

## 2019-05-01 NOTE — HISTORY OF PRESENT ILLNESS
[FreeTextEntry1] : Mr. Jimenez is a 78 year old male presenting to the office for a follow up visit for abnormal chest CT, elevated IgE level, severe asthma, KHADRA, postnasal drip, poor balance, snoring and shortness of breath. His chief complaint is SOB \par -he reports his wife's recent injury has caused him to climb more stairs\par -he notes he is becoming SOB more easily with stairs and while walking on the beach\par -he states he currently has sputum in his throat but he is not bringing it up\par -he reports his weight is currently stable\par -he notes his energy levels are currently low, rating them as a 5 out of 10\par -he states he uses his CPAP all night long but his sleep is still interrupted \par -he reports he has an intermittent mild cough\par -he notes his leg swelling has improved with use of Lasix\par -he states he was recently put on Cialis \par -he reports he uses his nasal sprays and inhalers daily\par -he denies any wheezing, chest pain, chest pressure, diarrhea, constipation, dysphagia, dizziness, sour taste in the mouth, heartburn, reflux, itchy eyes, itchy ears, leg swelling, leg pain, myalgias or arthralgias\par  \par

## 2019-05-01 NOTE — ASSESSMENT
[FreeTextEntry1] : Mr. CLAYTON SALAZAR is a 78 year with persistent and worsening bilateral  lower extremity venous insufficiency, CEAP classification C6 which compression therapy has been applied.  He had performed leg elevation, exercise and over the counter pain medications.  New ulcer from dressing falling down during the week to the anterior leg.   The patient’s symptoms interfere with their ADL’s, such as walking, shopping and house work, and their ability to work and exercise. Patient has intact pulses in both legs without evidence of arterial insufficiency.  \par Biopsy  taken to rule out cancer.  awaiting path report\par patient declines homecare.  He has aide changing dressing\par wound supplies ordered to  [employer]\par patient has a new dermatologist\par

## 2019-05-01 NOTE — PROCEDURE
[FreeTextEntry1] : PFT revealed moderate restrictive and severe obstructive dysfunction, normal flows, with a FEV1 of 1.37 L, which is 41% of predicted, with a normal flow volume loop\par

## 2019-05-01 NOTE — REVIEW OF SYSTEMS
[Fatigue] : fatigue [As Noted in HPI] : as noted in HPI [Cough] : cough [Sputum] : sputum  [Dyspnea] : dyspnea [Negative] : Sleep Disorder

## 2019-05-01 NOTE — HISTORY OF PRESENT ILLNESS
[FreeTextEntry1] : Mr. CLAYTON SALAZAR is a 78 year who presents for evaluation of  right lower extremity leg pain and nonhealing right leg ulceration for over one year duration.  .s/p RFA to right LE.   patient states that he had recurrent ulcer after the wound had healed when he used Viagra and Cialis together.  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 Pt has no fevers or chills today he complains of pain in the wound. pt is currently wearing compression stocking.  He has been off prednisone\par Patient's leg discomfort is associated with  leg swelling, fatigue, heaviness, achiness. \par Patient's symptoms have persisted despite conservative management with leg elevation, exercise, attempted weight loss, over-the-counter medications.\par Patient's symptoms are aggravated by weight bearing, prolonged standing, prolonged sitting.\par \par 9/5 [facility] for right leg ablation 9/18\par VN 3x week\par Patient's symptoms are alleviated by  leg elevation, exercise. \par Patient's symptoms interfere with the patient's  ADLs such as work, shopping and housekeeping.  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 \par \par

## 2019-05-01 NOTE — PHYSICAL EXAM
[2+] : right 2+ [Ankle Swelling (On Exam)] : present [Ankle Swelling On The Right] : mild [] : bilaterally [Ankle Swelling Bilaterally] : severe [Alert] : alert [Oriented to Place] : oriented to place [Oriented to Person] : oriented to person [Oriented to Time] : oriented to time [Calm] : calm [4 x 4] : 4 x 4  [JVD] : no jugular venous distention  [Abdomen Tenderness] : ~T ~M No abdominal tenderness [de-identified] : overweight, ambulatory, well groomed [de-identified] : non labored [de-identified] :  ambulatory [FreeTextEntry2] : 2.5 cm [FreeTextEntry1] : right lower calf  [FreeTextEntry3] : 2.5 cm [FreeTextEntry4] : 0.1 cm [de-identified] : 2/2/0.1 [de-identified] : foam [FreeTextEntry9] : 0.7 [FreeTextEntry8] : 1 [FreeTextEntry7] : anterior leg  [de-identified] : 0.1 [de-identified] : foam

## 2019-05-01 NOTE — ASSESSMENT
[FreeTextEntry1] : Mr. Jimenez is a 78 year old male with a history of asthma, allergies, GERD, OSAS, HTN, HLD, hypothyroidism, bladder/prostate issue; anxiety/depression, non-smoker, now coming back for pulmonary re-evaluation. He is presenting today with increased SOB \par \par His shortness of breath is felt to be multifactorial due to:\par -severe persistent asthma, controlled \par -poor breathing mechanics\par -emphysema\par -overweight \par -out of shape\par -? CAD \par -? tracheobronchomalacia (50%)\par \par problem: Acute pharyngitis \par -recommended [place] cold and sinus \par -recommended [employer] lozenges \par \par problem 1: severe persistent asthma / COPD / emphysema \par -continue DuoNeb by the nebulizer TID to QiD\par -continue Symbicort 160 2 inhalations BID then Qvar 80 at 2 puffs BID\par -Continue Incruse 1 inhalation QD \par -continue to use Singulair 10 mg QHS \par -add Daliresp 500 mg QD ( initiate with 250 mg for first 14 days)\par \par -Asthma is believed to be caused by inherited (genetic) and environmental factor, but its exact cause is unknown. Asthma may be triggered by allergens, lung infections, or irritants in the air. Asthma triggers are different for each person\par -Inhaler technique reviewed as well as oral hygiene techniques reviewed with patient. Avoidance of cold air, extremes of temperature, rescue inhaler should be used before exercise. Order of medication reviewed with patient. Recommended use of a cool mist humidifier in the bedroom. \par \par problem 2: poor breathing mechanics\par -Proper breathing techniques were reviewed with an emphasis of exhalation. Patient instructed to breath in for 1 second and out for four seconds. Patient was encouraged to not talk while walking. \par \par problem 3: abnormal chest CT/ r/o TBM (50%)\par -c/w mucus plugging\par -recommended to add acapella device to be done multiple times daily \par -follow up chest CT due June 2019\par \par problem 4: allergic rhinitis\par -recommended to try Navage nasal system \par -add Astelin 0.15% 1 inhalation each nostril BID \par -continue Clarinex 5 mg QHS \par -Continue Qnasl 1 sniff/nostril BID\par -Recommending Zaditor 1 eye drop BID\par \par -Environmental measures for allergies were encouraged including mattress and pillow cover, air purifier, and environmental controls.\par \par problem 5: r/o ABPA \par -Based on blood work (not present)\par \par problem 6: obesity \par -recommended to read 'Bright Spots and Landmines' by author Toi Gillette and 'Obesity Code' by Jason Reis \par -Weight loss, exercise, and diet control were discussed and are highly encouraged. Treatment options were given such as, aqua therapy, and contacting a nutritionist. Recommended to use the elliptical, stationary bike, less use of treadmill. Obesity is associated with worsening asthma, shortness of breath, and potential for cardiac disease, diabetes, and other underlying medical conditions.\par \par problem 7: OSAS (stable now)\par -continue CPAP (increased pressure necessary)- compliant\par -Sleep apnea is associated with adverse clinical consequences which an affect most organ systems. Cardiovascular disease risk includes arrhythmias, atrial fibrillation, hypertension, coronary artery disease, and stroke. Metabolic disorders include diabetes type 2, non-alcoholic fatty liver disease. Mood disorder especially depression; and cognitive decline especially in the elderly. Associations with chronic reflux/Mancera’s esophagus some but not all inclusive. \par -Reasons to assess this include arousal consistent with hypopnea; respiratory events most prominent in REM sleep or supine position; therefore sleep staging and body position are important for accurate diagnosis and estimation of AHI. \par \par Problem 8: elevated IgE\par -Received Xolair today in [facility]\par -Xolair is a recombinant DNA- derived humanized IgG1K monoclonal antibody that selectively binds to human immunoglobulin E (IgE). Xolair is produced by a Chinese hamster ovary cell suspension culture in nutrient medium containing the antibiotic gentamicin. Gentamicin is not detectable in the final product. Xolair is a sterile, white, preservative free, lyophilized powder contained in a single use vial that is reconstituted with sterile water for suspension. Side effects include: wheezing, tightness of the chest, trouble breathing, hives, skin rash, feeling anxious or light-headed, fainting, warmth or tingling under skin, or swelling of face, lips, or tongue \par \par problem 9: poor balance\par -balance therapy\par \par problem 10: health maintenance \par -s/p influenza vaccination 2018\par -recommended strep pneumonia vaccines: Prevnar-13 vaccine, followed by Pneumo vaccine 23 one year following\par -recommended early intervention for URIs\par -recommended regular osteoporosis evaluations\par -recommended early dermatological evaluations\par -recommended after the age of 50 to the age of 70, colonoscopy every 5 years \par  \par F/U in 3 months\par He is encouraged to call with any changes, concerns, or questions. \par

## 2019-05-01 NOTE — PHYSICAL EXAM
[General Appearance - Well Developed] : well developed [Normal Appearance] : normal appearance [General Appearance - Well Nourished] : well nourished [Well Groomed] : well groomed [No Deformities] : no deformities [General Appearance - In No Acute Distress] : no acute distress [Normal Conjunctiva] : the conjunctiva exhibited no abnormalities [Eyelids - No Xanthelasma] : the eyelids demonstrated no xanthelasmas [Normal Oropharynx] : normal oropharynx [III] : III [Neck Appearance] : the appearance of the neck was normal [Neck Cervical Mass (___cm)] : no neck mass was observed [Jugular Venous Distention Increased] : there was no jugular-venous distention [Thyroid Diffuse Enlargement] : the thyroid was not enlarged [Thyroid Nodule] : there were no palpable thyroid nodules [Heart Sounds] : normal S1 and S2 [Heart Rate And Rhythm] : heart rate and rhythm were normal [Respiration, Rhythm And Depth] : normal respiratory rhythm and effort [Exaggerated Use Of Accessory Muscles For Inspiration] : no accessory muscle use [Auscultation Breath Sounds / Voice Sounds] : lungs were clear to auscultation bilaterally [Abdomen Soft] : soft [Abdomen Tenderness] : non-tender [Abdomen Mass (___ Cm)] : no abdominal mass palpated [Abnormal Walk] : normal gait [Gait - Sufficient For Exercise Testing] : the gait was sufficient for exercise testing [Nail Clubbing] : no clubbing of the fingernails [Cyanosis, Localized] : no localized cyanosis [Petechial Hemorrhages (___cm)] : no petechial hemorrhages [1+ Pitting] : 1+  pitting [Skin Color & Pigmentation] : normal skin color and pigmentation [] : no rash [No Venous Stasis] : no venous stasis [Skin Lesions] : no skin lesions [No Skin Ulcers] : no skin ulcer [No Xanthoma] : no  xanthoma was observed [Deep Tendon Reflexes (DTR)] : deep tendon reflexes were 2+ and symmetric [Sensation] : the sensory exam was normal to light touch and pinprick [No Focal Deficits] : no focal deficits [Oriented To Time, Place, And Person] : oriented to person, place, and time [Impaired Insight] : insight and judgment were intact [Affect] : the affect was normal [FreeTextEntry1] : I:E 1:3, clear

## 2019-05-08 ENCOUNTER — APPOINTMENT (OUTPATIENT)
Dept: WOUND CARE | Facility: CLINIC | Age: 79
End: 2019-05-08
Payer: COMMERCIAL

## 2019-05-08 PROCEDURE — 99213 OFFICE O/P EST LOW 20 MIN: CPT

## 2019-05-08 NOTE — PHYSICAL EXAM
[2+] : right 2+ [Ankle Swelling (On Exam)] : present [Ankle Swelling On The Right] : mild [] : bilaterally [Ankle Swelling Bilaterally] : severe [Alert] : alert [Oriented to Person] : oriented to person [Oriented to Place] : oriented to place [Oriented to Time] : oriented to time [Calm] : calm [4 x 4] : 4 x 4  [JVD] : no jugular venous distention  [de-identified] : overweight, ambulatory, well groomed [Abdomen Tenderness] : ~T ~M No abdominal tenderness [de-identified] : non labored [de-identified] :  ambulatory [FreeTextEntry3] : 2.5 cm [FreeTextEntry1] : right Achilles area [FreeTextEntry2] : 2 cm [de-identified] : foam [FreeTextEntry4] : 0.1 cm [FreeTextEntry8] : 1 [FreeTextEntry7] : anterior leg  [FreeTextEntry9] : 0.7 [de-identified] : foam [de-identified] : 0.1

## 2019-05-08 NOTE — PROCEDURE
[FreeTextEntry3] : \par \par  [FreeTextEntry1] : radiofrequency ablation of the right small saphenous vein

## 2019-05-08 NOTE — HISTORY OF PRESENT ILLNESS
[FreeTextEntry1] : Mr. CLAYTON SALAZAR is a 78 year who presents for evaluation of  right lower extremity leg pain and nonhealing right leg ulceration for over one year duration.  .s/p RFA to right LE.   patient states that he had recurrent ulcer after the wound had healed when he used Viagra and Cialis together.  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 Pt has no fevers or chills today he complains of pain in the wound. pt is currently wearing compression stocking.  He has been off prednisone\par Patient's leg discomfort is associated with  leg swelling, fatigue, heaviness, achiness. \par Patient's symptoms have persisted despite conservative management with leg elevation, exercise, attempted weight loss, over-the-counter medications.\par Patient's symptoms are aggravated by weight bearing, prolonged standing, prolonged sitting.\par \par 9/5 [facility] for right leg ablation 9/18\par VN 3x week\par Patient's symptoms are alleviated by  leg elevation, exercise. \par Patient's symptoms interfere with the patient's  ADLs such as work, shopping and housekeeping.  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 \par \par 5/8/19- s/p bilat RFA [facility]\par [place] to assist in weight reduction\par \par

## 2019-05-08 NOTE — ASSESSMENT
[FreeTextEntry1] : Mr. CLAYTON SALAZAR is a 78 year with persistent and worsening bilateral  lower extremity venous insufficiency, CEAP classification C6 which compression therapy has been applied.  He had performed leg elevation, exercise and over the counter pain medications.  New ulcer from dressing falling down during the week to the anterior leg.   The patient’s symptoms interfere with their ADL’s, such as walking, shopping and house work, and their ability to work and exercise. Patient has intact pulses in both legs without evidence of arterial insufficiency.  \par Biopsy  taken to rule out cancer.  awaiting path report\par patient declines homecare.  He has aide changing dressing\par wound supplies ordered to  [email or web address]\par patient has a new dermatologist\par

## 2019-05-15 ENCOUNTER — APPOINTMENT (OUTPATIENT)
Dept: WOUND CARE | Facility: CLINIC | Age: 79
End: 2019-05-15
Payer: COMMERCIAL

## 2019-05-15 ENCOUNTER — APPOINTMENT (OUTPATIENT)
Dept: PULMONOLOGY | Facility: CLINIC | Age: 79
End: 2019-05-15
Payer: COMMERCIAL

## 2019-05-15 VITALS
HEART RATE: 73 BPM | HEIGHT: 73 IN | SYSTOLIC BLOOD PRESSURE: 147 MMHG | BODY MASS INDEX: 37.51 KG/M2 | DIASTOLIC BLOOD PRESSURE: 76 MMHG | TEMPERATURE: 97.9 F | WEIGHT: 283 LBS

## 2019-05-15 VITALS
HEART RATE: 66 BPM | SYSTOLIC BLOOD PRESSURE: 120 MMHG | DIASTOLIC BLOOD PRESSURE: 70 MMHG | OXYGEN SATURATION: 97 % | WEIGHT: 283 LBS | HEIGHT: 73 IN | RESPIRATION RATE: 16 BRPM | BODY MASS INDEX: 37.51 KG/M2

## 2019-05-15 PROCEDURE — 15271 SKIN SUB GRAFT TRNK/ARM/LEG: CPT

## 2019-05-15 PROCEDURE — 96372 THER/PROPH/DIAG INJ SC/IM: CPT

## 2019-05-15 RX ORDER — OMALIZUMAB 150 MG/ML
150 INJECTION, SOLUTION SUBCUTANEOUS
Qty: 0 | Refills: 0 | Status: COMPLETED | OUTPATIENT
Start: 2019-05-15

## 2019-05-15 RX ORDER — OMALIZUMAB 75 MG/.5ML
75 INJECTION, SOLUTION SUBCUTANEOUS
Qty: 0 | Refills: 0 | Status: COMPLETED | OUTPATIENT
Start: 2019-05-15

## 2019-05-15 RX ADMIN — OMALIZUMAB 150 MG/ML: 150 INJECTION, SOLUTION SUBCUTANEOUS at 00:00

## 2019-05-15 RX ADMIN — OMALIZUMAB 75 MG/0.5ML: 75 INJECTION, SOLUTION SUBCUTANEOUS at 00:00

## 2019-05-15 NOTE — ASSESSMENT
[FreeTextEntry1] : Mr. CLAYTON SALAZAR is a 78 year with persistent and worsening bilateral  lower extremity venous insufficiency, CEAP classification C6 which compression therapy has been applied.  He had performed leg elevation, exercise and over the counter pain medications.  New ulcer from dressing falling down during the week to the anterior leg.   The patient’s symptoms interfere with their ADL’s, such as walking, shopping and house work, and their ability to work and exercise. Patient has intact pulses in both legs without evidence of arterial insufficiency.  \par Biopsy  taken to rule out cancer.  awaiting path report\par patient declines homecare.  He has aide changing dressing\par wound supplies ordered to [employer]\par patient has a new dermatologist\par Apligraf number 5 applied today to patient’s right leg.   Wound bed debrided of bioburden and prepped for product application. 1 piece of 44 sq cm obtained.  Total product usage was  4 sq. cm, Total waste 44 sq. cm due to wound size.  Product secured with steri strips and bolster dressing.\par Patient to f/u in 1 week.\par \par

## 2019-05-15 NOTE — PHYSICAL EXAM
[Ankle Swelling (On Exam)] : present [2+] : right 2+ [Ankle Swelling On The Right] : mild [Ankle Swelling Bilaterally] : severe [] : bilaterally [Oriented to Person] : oriented to person [Alert] : alert [Oriented to Time] : oriented to time [Oriented to Place] : oriented to place [Calm] : calm [4 x 4] : 4 x 4  [JVD] : no jugular venous distention  [Abdomen Tenderness] : ~T ~M No abdominal tenderness [de-identified] : overweight, ambulatory, well groomed [de-identified] : non labored [de-identified] :  ambulatory [FreeTextEntry1] : right lower calf  [FreeTextEntry3] : 2 cm [FreeTextEntry2] : 2 cm [de-identified] : foam [FreeTextEntry4] : 0.1 cm [FreeTextEntry7] : anterior leg  [FreeTextEntry8] : 1 [FreeTextEntry9] : 0.7 [de-identified] : 0.1 [de-identified] : foam

## 2019-05-22 ENCOUNTER — RX RENEWAL (OUTPATIENT)
Age: 79
End: 2019-05-22

## 2019-05-22 ENCOUNTER — APPOINTMENT (OUTPATIENT)
Dept: WOUND CARE | Facility: CLINIC | Age: 79
End: 2019-05-22
Payer: COMMERCIAL

## 2019-05-22 PROCEDURE — 29581 APPL MULTLAYER CMPRN SYS LEG: CPT | Mod: RT

## 2019-05-23 ENCOUNTER — RX CHANGE (OUTPATIENT)
Age: 79
End: 2019-05-23

## 2019-05-23 NOTE — PHYSICAL EXAM
[2+] : right 2+ [Ankle Swelling (On Exam)] : present [Ankle Swelling On The Right] : mild [] : bilaterally [Ankle Swelling Bilaterally] : severe [Alert] : alert [Oriented to Person] : oriented to person [Oriented to Place] : oriented to place [Oriented to Time] : oriented to time [Calm] : calm [4 x 4] : 4 x 4  [JVD] : no jugular venous distention  [Abdomen Tenderness] : ~T ~M No abdominal tenderness [de-identified] : overweight, ambulatory, well groomed [de-identified] : non labored [de-identified] :  ambulatory [FreeTextEntry1] : right lower calf  [FreeTextEntry2] : 2 cm [FreeTextEntry3] : 2 cm [FreeTextEntry4] : 0.1 cm [de-identified] : foam [FreeTextEntry7] : anterior leg  [FreeTextEntry8] : 1 [FreeTextEntry9] : 0.7 [de-identified] : 0.1 [de-identified] : foam

## 2019-05-23 NOTE — HISTORY OF PRESENT ILLNESS
[FreeTextEntry1] : Mr. CLAYTON SALAZAR is a 78 year who presents for evaluation of  right lower extremity leg pain and nonhealing right leg ulceration for over one year duration.  .s/p RFA to right LE.   patient states that he had recurrent ulcer after the wound had healed when he used Viagra and Cialis together.  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 Pt has no fevers or chills today he complains of pain in the wound. pt is currently wearing compression stocking.  He has been off prednisone\par Patient's leg discomfort is associated with  leg swelling, fatigue, heaviness, achiness. \par Patient's symptoms have persisted despite conservative management with leg elevation, exercise, attempted weight loss, over-the-counter medications.\par Patient's symptoms are aggravated by weight bearing, prolonged standing, prolonged sitting.\par \par 9/5 [facility] for right leg ablation 9/18\par VN 3x week\par Patient's symptoms are alleviated by  leg elevation, exercise. \par Patient's symptoms interfere with the patient's  ADLs such as work, shopping and housekeeping.  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 \par \par

## 2019-05-23 NOTE — ASSESSMENT
[FreeTextEntry1] : Mr. CLAYTON SALAZAR is a 78 year with persistent and worsening bilateral  lower extremity venous insufficiency, CEAP classification C6 which compression therapy has been applied.  He had performed leg elevation, exercise and over the counter pain medications.  New ulcer from dressing falling down during the week to the anterior leg.   The patient’s symptoms interfere with their ADL’s, such as walking, shopping and house work, and their ability to work and exercise. Patient has intact pulses in both legs without evidence of arterial insufficiency.  \par Biopsy  taken to rule out cancer.  awaiting path report\par patient declines homecare.  He has aide changing dressing\par wound supplies ordered to [employer]\par patient has a new dermatologist\par \par 5/22/19 S/P Apligraf number 5 applied last week to patient’s right leg.   Remains in place will leave until next week.

## 2019-05-29 ENCOUNTER — APPOINTMENT (OUTPATIENT)
Dept: WOUND CARE | Facility: CLINIC | Age: 79
End: 2019-05-29
Payer: COMMERCIAL

## 2019-05-29 ENCOUNTER — APPOINTMENT (OUTPATIENT)
Dept: PULMONOLOGY | Facility: CLINIC | Age: 79
End: 2019-05-29
Payer: COMMERCIAL

## 2019-05-29 VITALS
OXYGEN SATURATION: 96 % | BODY MASS INDEX: 36.71 KG/M2 | WEIGHT: 277 LBS | HEIGHT: 73 IN | DIASTOLIC BLOOD PRESSURE: 70 MMHG | RESPIRATION RATE: 14 BRPM | SYSTOLIC BLOOD PRESSURE: 120 MMHG | HEART RATE: 78 BPM

## 2019-05-29 PROCEDURE — 96372 THER/PROPH/DIAG INJ SC/IM: CPT

## 2019-05-29 PROCEDURE — 29581 APPL MULTLAYER CMPRN SYS LEG: CPT | Mod: RT

## 2019-05-29 RX ORDER — OMALIZUMAB 75 MG/.5ML
75 INJECTION, SOLUTION SUBCUTANEOUS
Qty: 0 | Refills: 0 | Status: COMPLETED | OUTPATIENT
Start: 2019-05-29

## 2019-05-29 RX ORDER — OMALIZUMAB 150 MG/ML
150 INJECTION, SOLUTION SUBCUTANEOUS
Qty: 0 | Refills: 0 | Status: COMPLETED | OUTPATIENT
Start: 2019-05-29

## 2019-05-29 RX ADMIN — OMALIZUMAB 150 MG/ML: 150 INJECTION, SOLUTION SUBCUTANEOUS at 00:00

## 2019-05-29 RX ADMIN — OMALIZUMAB 75 MG/0.5ML: 75 INJECTION, SOLUTION SUBCUTANEOUS at 00:00

## 2019-05-30 LAB — BACTERIA SPEC CULT: ABNORMAL

## 2019-05-30 NOTE — PHYSICAL EXAM
[Ankle Swelling (On Exam)] : present [2+] : right 2+ [Ankle Swelling On The Right] : of the right ankle [Ankle Swelling Bilaterally] : severe [] : bilaterally [Alert] : alert [Oriented to Time] : oriented to time [Oriented to Place] : oriented to place [Oriented to Person] : oriented to person [Calm] : calm [4 x 4] : 4 x 4  [JVD] : no jugular venous distention  [Abdomen Tenderness] : ~T ~M No abdominal tenderness [de-identified] : overweight, ambulatory, well groomed [de-identified] : non labored [de-identified] :  ambulatory [FreeTextEntry1] : right lower calf  [FreeTextEntry2] : 3 [FreeTextEntry3] : 2 [FreeTextEntry4] : 0.1 cm [de-identified] : foam [de-identified] : 2/2/0.1 [FreeTextEntry7] : anterior leg  [FreeTextEntry8] : 1 [FreeTextEntry9] : 0.7 [de-identified] : 0.1 [de-identified] : foam

## 2019-05-30 NOTE — HISTORY OF PRESENT ILLNESS
[FreeTextEntry1] : Mr. CLAYTON SALAZAR is a 78 year who presents for evaluation of  right lower extremity leg pain and nonhealing right leg ulceration for over one year duration.  .s/p RFA to right LE.   patient states that he had recurrent ulcer after the wound had healed when he used Viagra and Cialis together.  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 Pt has no fevers or chills today he complains of pain in the wound. pt is currently wearing compression stocking.  He has been off prednisone\par Patient's leg discomfort is associated with  leg swelling, fatigue, heaviness, achiness. \par Patient's symptoms have persisted despite conservative management with leg elevation, exercise, attempted weight loss, over-the-counter medications.\par Patient's symptoms are aggravated by weight bearing, prolonged standing, prolonged sitting.\par \par 9/5 [facility] for right leg ablation 9/18\par VN 3x week\par Patient's symptoms are alleviated by  leg elevation, exercise. \par Patient's symptoms interfere with the patient's  ADLs such as work, shopping and housekeeping.  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 \par \par

## 2019-05-30 NOTE — ASSESSMENT
[FreeTextEntry1] : Mr. CLAYTON SALAZAR is a 78 year with persistent and worsening bilateral  lower extremity venous insufficiency, CEAP classification C6 which compression therapy has been applied.  He had performed leg elevation, exercise and over the counter pain medications.  New ulcer from dressing falling down during the week to the anterior leg.   The patient’s symptoms interfere with their ADL’s, such as walking, shopping and house work, and their ability to work and exercise. Patient has intact pulses in both legs without evidence of arterial insufficiency.  \par Biopsy  taken to rule out cancer.  awaiting path report\par patient declines homecare.  He has aide changing dressing\par wound supplies ordered to [facility]\par patient has a new dermatologist\par \par 5/22/19 S/P Apligraf number 5 applied last week to patient’s right leg.   Remains in place will leave until next week.

## 2019-05-30 NOTE — DISCUSSION/SUMMARY
[FreeTextEntry1] : Spoke with pt. regarding culture results, placed on bactrim for 7 days, sent to pharmacy

## 2019-06-05 ENCOUNTER — APPOINTMENT (OUTPATIENT)
Dept: WOUND CARE | Facility: CLINIC | Age: 79
End: 2019-06-05
Payer: COMMERCIAL

## 2019-06-05 VITALS
HEART RATE: 76 BPM | DIASTOLIC BLOOD PRESSURE: 84 MMHG | SYSTOLIC BLOOD PRESSURE: 153 MMHG | TEMPERATURE: 97.9 F | RESPIRATION RATE: 18 BRPM

## 2019-06-05 PROCEDURE — 29581 APPL MULTLAYER CMPRN SYS LEG: CPT | Mod: RT

## 2019-06-07 ENCOUNTER — APPOINTMENT (OUTPATIENT)
Dept: WOUND CARE | Facility: CLINIC | Age: 79
End: 2019-06-07

## 2019-06-07 NOTE — PHYSICAL EXAM
[2+] : right 2+ [Ankle Swelling (On Exam)] : present [Ankle Swelling On The Right] : mild [Ankle Swelling Bilaterally] : severe [] : bilaterally [Alert] : alert [Oriented to Person] : oriented to person [Oriented to Time] : oriented to time [Oriented to Place] : oriented to place [Calm] : calm [4 x 4] : 4 x 4  [Abdomen Tenderness] : ~T ~M No abdominal tenderness [JVD] : no jugular venous distention  [de-identified] : overweight, ambulatory, well groomed [de-identified] :  ambulatory [de-identified] : non labored [FreeTextEntry2] : 3 [FreeTextEntry1] : right lower calf  [FreeTextEntry3] : 2 [FreeTextEntry4] : 0.1 cm [de-identified] : foam

## 2019-06-07 NOTE — ASSESSMENT
[FreeTextEntry1] : 6/5/19\par \par The patient presents with a wound to the right calf.  minimal Swelling noted to the extremity.  wound cleaned and debrided mechanically.\par Wound culture obtained. physicians access referral given for plastics \par \par Recommendation:\par \par Apply lidocaine or topical anesthetic.\par Wash wound with ----Dakins 0.125%.\par Apply ----foam\par Apply ----multi-layer compression bandage\par Change dressing ---2  times per week.\par Leg elevation as tolerated\par Encouraged ambulation or exercise.\par Optimization of nutrition.\par Offloading to the wound site.\par \par Patient given contact information (referral for plastics)\par \par Follow up appointment scheduled for for 1 week\par

## 2019-06-07 NOTE — HISTORY OF PRESENT ILLNESS
[FreeTextEntry1] : Mr. CLAYTON SALAZAR is a 78 year who presents for evaluation of  right lower extremity leg pain and nonhealing right leg ulceration for over one year duration.  .s/p RFA to right LE.   patient states that he had recurrent ulcer after the wound had healed when he used Viagra and Cialis together.  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 Pt has no fevers or chills today he complains of pain in the wound. pt is currently wearing compression stocking.  He has been off prednisone\par Patient's leg discomfort is associated with  leg swelling, fatigue, heaviness, achiness. \par Patient's symptoms have persisted despite conservative management with leg elevation, exercise, attempted weight loss, over-the-counter medications.\par Patient's symptoms are aggravated by weight bearing, prolonged standing, prolonged sitting.\par \par 9/5 [facility] for right leg ablation 9/18\par VN 3x week\par Patient's symptoms are alleviated by  leg elevation, exercise. \par Patient's symptoms interfere with the patient's  ADLs such as work, shopping and housekeeping.  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 \par \par

## 2019-06-12 ENCOUNTER — APPOINTMENT (OUTPATIENT)
Dept: WOUND CARE | Facility: CLINIC | Age: 79
End: 2019-06-12
Payer: MEDICARE

## 2019-06-12 ENCOUNTER — APPOINTMENT (OUTPATIENT)
Dept: PULMONOLOGY | Facility: CLINIC | Age: 79
End: 2019-06-12
Payer: COMMERCIAL

## 2019-06-12 VITALS
BODY MASS INDEX: 36.55 KG/M2 | DIASTOLIC BLOOD PRESSURE: 76 MMHG | SYSTOLIC BLOOD PRESSURE: 128 MMHG | TEMPERATURE: 97.8 F | HEART RATE: 77 BPM

## 2019-06-12 VITALS
DIASTOLIC BLOOD PRESSURE: 70 MMHG | RESPIRATION RATE: 18 BRPM | BODY MASS INDEX: 36.05 KG/M2 | WEIGHT: 272 LBS | SYSTOLIC BLOOD PRESSURE: 125 MMHG | HEIGHT: 73 IN | OXYGEN SATURATION: 95 % | HEART RATE: 84 BPM

## 2019-06-12 PROCEDURE — 29581 APPL MULTLAYER CMPRN SYS LEG: CPT | Mod: RT

## 2019-06-12 PROCEDURE — 96372 THER/PROPH/DIAG INJ SC/IM: CPT

## 2019-06-12 RX ORDER — OMALIZUMAB 150 MG/ML
150 INJECTION, SOLUTION SUBCUTANEOUS
Qty: 0 | Refills: 0 | Status: COMPLETED | OUTPATIENT
Start: 2019-06-12

## 2019-06-12 RX ORDER — OMALIZUMAB 75 MG/.5ML
75 INJECTION, SOLUTION SUBCUTANEOUS
Qty: 0 | Refills: 0 | Status: COMPLETED | OUTPATIENT
Start: 2019-06-12

## 2019-06-12 RX ADMIN — OMALIZUMAB 150 MG/ML: 150 INJECTION, SOLUTION SUBCUTANEOUS at 00:00

## 2019-06-12 RX ADMIN — OMALIZUMAB 75 MG/0.5ML: 75 INJECTION, SOLUTION SUBCUTANEOUS at 00:00

## 2019-06-13 LAB — BACTERIA SPEC CULT: ABNORMAL

## 2019-06-13 NOTE — PHYSICAL EXAM
[2+] : right 2+ [Ankle Swelling (On Exam)] : present [Ankle Swelling On The Right] : of the right ankle [Ankle Swelling Bilaterally] : severe [] : bilaterally [Oriented to Place] : oriented to place [Alert] : alert [Oriented to Person] : oriented to person [Oriented to Time] : oriented to time [Calm] : calm [4 x 4] : 4 x 4  [JVD] : no jugular venous distention  [Abdomen Tenderness] : ~T ~M No abdominal tenderness [de-identified] : non labored [de-identified] : overweight, ambulatory, well groomed [de-identified] :  ambulatory [FreeTextEntry2] : 2.2 cm [FreeTextEntry1] : right lower calf  [de-identified] : 2 [FreeTextEntry3] : 2.2 cm [de-identified] : foam [FreeTextEntry4] : 0.1 cm [de-identified] : 3/2/0.1

## 2019-06-17 ENCOUNTER — APPOINTMENT (OUTPATIENT)
Dept: PLASTIC SURGERY | Facility: CLINIC | Age: 79
End: 2019-06-17
Payer: COMMERCIAL

## 2019-06-17 VITALS — BODY MASS INDEX: 36.05 KG/M2 | WEIGHT: 272 LBS | HEIGHT: 73 IN

## 2019-06-17 PROCEDURE — 99203 OFFICE O/P NEW LOW 30 MIN: CPT

## 2019-06-19 ENCOUNTER — APPOINTMENT (OUTPATIENT)
Dept: WOUND CARE | Facility: CLINIC | Age: 79
End: 2019-06-19
Payer: MEDICARE

## 2019-06-19 PROCEDURE — 99213 OFFICE O/P EST LOW 20 MIN: CPT

## 2019-06-19 NOTE — ASSESSMENT
[FreeTextEntry1] : 6/5/19\par \par The patient presents with a wound to the right calf.  minimal Swelling noted to the extremity.  wound cleaned and debrided mechanically.\par Wound culture obtained. physicians access referral given for plastics \par \par Wound continues to decrease, saw plastics, wound too small for intervention\par Plan - Chelsea, foam, Unna\par Has home health aide to change dressing\par

## 2019-06-19 NOTE — REASON FOR VISIT
[Follow-Up: _____] : a [unfilled] follow-up visit [Other: _____] : [unfilled] [FreeTextEntry1] : a wound on his right leg

## 2019-06-19 NOTE — HISTORY OF PRESENT ILLNESS
[FreeTextEntry1] : Mr. CLAYTON SALAZAR is a 78 year who presents for evaluation of  right lower extremity leg pain and nonhealing right leg ulceration for over one year duration.  .s/p RFA to right LE.   patient states that he had recurrent ulcer after the wound had healed when he used Viagra and Cialis together.  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 Pt has no fevers or chills today he complains of pain in the wound. pt is currently wearing compression stocking.  He has been off prednisone\par Patient's leg discomfort is associated with  leg swelling, fatigue, heaviness, achiness. \par Patient's symptoms have persisted despite conservative management with leg elevation, exercise, attempted weight loss, over-the-counter medications.\par Patient's symptoms are aggravated by weight bearing, prolonged standing, prolonged sitting.\par \par 9/5 [facility] for right leg ablation 9/18\par VN 3x week\par Patient's symptoms are alleviated by  leg elevation, exercise. \par Patient's symptoms interfere with the patient's  ADLs such as work, shopping and housekeeping.  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 \par 6/19/19- per patient Plastics did not advise use of STSG- wound "too small"\par Leaving for [place] 7/5/19\par Has an Aide- re COPD, no VN\par Walks [facility]\par \par

## 2019-06-19 NOTE — PHYSICAL EXAM
[2+] : right 2+ [Ankle Swelling (On Exam)] : present [Ankle Swelling On The Right] : mild [Alert] : alert [Oriented to Person] : oriented to person [Oriented to Place] : oriented to place [Oriented to Time] : oriented to time [Calm] : calm [4 x 4] : 4 x 4  [JVD] : no jugular venous distention  [] : of the right leg [Ankle Swelling On The Left] : moderate [Abdomen Tenderness] : ~T ~M No abdominal tenderness [de-identified] : overweight, ambulatory, well groomed [de-identified] : non labored [de-identified] :  ambulatory [de-identified] : 2 [FreeTextEntry1] : right lower calf  [FreeTextEntry2] : 1 [FreeTextEntry3] : 0.5 [FreeTextEntry4] : 0.1 cm [de-identified] : aidan

## 2019-06-26 ENCOUNTER — APPOINTMENT (OUTPATIENT)
Dept: WOUND CARE | Facility: CLINIC | Age: 79
End: 2019-06-26
Payer: COMMERCIAL

## 2019-06-26 ENCOUNTER — APPOINTMENT (OUTPATIENT)
Dept: PULMONOLOGY | Facility: CLINIC | Age: 79
End: 2019-06-26
Payer: COMMERCIAL

## 2019-06-26 VITALS
BODY MASS INDEX: 35.92 KG/M2 | SYSTOLIC BLOOD PRESSURE: 120 MMHG | RESPIRATION RATE: 15 BRPM | HEIGHT: 73 IN | WEIGHT: 271 LBS | OXYGEN SATURATION: 96 % | HEART RATE: 75 BPM | DIASTOLIC BLOOD PRESSURE: 70 MMHG

## 2019-06-26 PROCEDURE — 29581 APPL MULTLAYER CMPRN SYS LEG: CPT | Mod: RT

## 2019-06-26 PROCEDURE — 96372 THER/PROPH/DIAG INJ SC/IM: CPT

## 2019-06-26 RX ORDER — OMALIZUMAB 75 MG/.5ML
75 INJECTION, SOLUTION SUBCUTANEOUS
Qty: 0 | Refills: 0 | Status: COMPLETED | OUTPATIENT
Start: 2019-06-26

## 2019-06-26 RX ORDER — OMALIZUMAB 150 MG/ML
150 INJECTION, SOLUTION SUBCUTANEOUS
Qty: 0 | Refills: 0 | Status: COMPLETED | OUTPATIENT
Start: 2019-06-26

## 2019-06-26 RX ADMIN — OMALIZUMAB 150 MG/ML: 150 INJECTION, SOLUTION SUBCUTANEOUS at 00:00

## 2019-06-26 RX ADMIN — OMALIZUMAB 75 MG/0.5ML: 75 INJECTION, SOLUTION SUBCUTANEOUS at 00:00

## 2019-06-26 NOTE — ASSESSMENT
[FreeTextEntry1] : Right lower leg venous stasis ulcer.\par \par Recommend continued wound care. Follow-up as needed.

## 2019-06-26 NOTE — HISTORY OF PRESENT ILLNESS
[FreeTextEntry1] : 78-year-old man presents with a wound of the right lower extremity present for several months after excision of a basal cell carcinoma. It has been treated with skin substitute material on multiple occasions. He has known venous stasis issues with the leg. He was referred for consideration for skin grafting.\par \par PMH: Asthma, venous stasis, obesity\par PSH: Knee and hip replacements\par All: NKDA

## 2019-06-26 NOTE — CONSULT LETTER
[Consult Letter:] : I had the pleasure of evaluating your patient, [unfilled]. [Dear  ___] : Dear  [unfilled], [Sincerely,] : Sincerely, [Consult Closing:] : Thank you very much for allowing me to participate in the care of this patient.  If you have any questions, please do not hesitate to contact me. [DrShannon  ___] : Dr. GHOSH [FreeTextEntry3] : Paul Nogueira MD, MS

## 2019-06-26 NOTE — HISTORY OF PRESENT ILLNESS
Patient's  called wanting to discuss changing medication or dosage for patient's Parkinsons.  Please call [phone]   [FreeTextEntry1] : Mr. CLAYTON SALAZAR is a 78 year who presents for evaluation of  right lower extremity leg pain and nonhealing right leg ulceration for over one year duration.  .s/p RFA to right LE.   patient states that he had recurrent ulcer after the wound had healed when he used Viagra and Cialis together.  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 Pt has no fevers or chills today he complains of pain in the wound. pt is currently wearing compression stocking.  He has been off prednisone\par Patient's leg discomfort is associated with  leg swelling, fatigue, heaviness, achiness. \par Patient's symptoms have persisted despite conservative management with leg elevation, exercise, attempted weight loss, over-the-counter medications.\par Patient's symptoms are aggravated by weight bearing, prolonged standing, prolonged sitting.\par \par 9/5 [facility] for right leg ablation 9/18\par VN 3x week\par Patient's symptoms are alleviated by  leg elevation, exercise. \par Patient's symptoms interfere with the patient's  ADLs such as work, shopping and housekeeping.  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 \par 6/19/19- per patient Plastics did not advise use of STSG- wound "too small"\par Leaving for [place] 7/5/19\par Has an Aide- re COPD, no VN\par Walks [facility]\par \par

## 2019-06-26 NOTE — REVIEW OF SYSTEMS
[Chills] : no chills [Fever] : no fever [Shortness Of Breath] : no shortness of breath [Chest Pain] : no chest pain [As Noted in HPI] : as noted in HPI

## 2019-06-26 NOTE — PHYSICAL EXAM
[2+] : right 2+ [Ankle Swelling (On Exam)] : present [Ankle Swelling On The Right] : mild [] : of the right leg [Ankle Swelling On The Left] : moderate [Alert] : alert [Oriented to Person] : oriented to person [Oriented to Place] : oriented to place [Oriented to Time] : oriented to time [Calm] : calm [4 x 4] : 4 x 4  [JVD] : no jugular venous distention  [Abdomen Tenderness] : ~T ~M No abdominal tenderness [de-identified] : non labored [de-identified] : overweight, ambulatory, well groomed [de-identified] :  ambulatory [de-identified] : 2 [FreeTextEntry2] : 1 [FreeTextEntry1] : right lower calf  [FreeTextEntry3] : 0.5 [de-identified] : aidan  [FreeTextEntry4] : 0.2 cm

## 2019-06-26 NOTE — PHYSICAL EXAM
[de-identified] : NAD [de-identified] : clerae clear [de-identified] : NC/AT [de-identified] : right leg with lateral ulcer. Square surrounding area appears mostly epithelialized. Open healthy-appearing central portion is 5mm x 8 mm. No overgrown granulation tissue. No significant surrounding induration or tenderness. minimal drainage. [de-identified] : normal respiratory effort

## 2019-06-26 NOTE — PHYSICAL EXAM
[2+] : right 2+ [Ankle Swelling (On Exam)] : present [Ankle Swelling On The Right] : mild [] : of the right leg [Oriented to Person] : oriented to person [Alert] : alert [Ankle Swelling On The Left] : moderate [Oriented to Place] : oriented to place [Oriented to Time] : oriented to time [Calm] : calm [4 x 4] : 4 x 4  [JVD] : no jugular venous distention  [Abdomen Tenderness] : ~T ~M No abdominal tenderness [de-identified] : overweight, ambulatory, well groomed [de-identified] : non labored [de-identified] :  ambulatory [de-identified] : 2 [FreeTextEntry1] : right lower calf  [FreeTextEntry2] : 1 [FreeTextEntry3] : 0.5 [FreeTextEntry4] : 0.2 cm [de-identified] : aidan

## 2019-06-30 ENCOUNTER — FORM ENCOUNTER (OUTPATIENT)
Age: 79
End: 2019-06-30

## 2019-07-01 ENCOUNTER — OUTPATIENT (OUTPATIENT)
Dept: OUTPATIENT SERVICES | Facility: HOSPITAL | Age: 79
LOS: 1 days | End: 2019-07-01
Payer: COMMERCIAL

## 2019-07-01 ENCOUNTER — APPOINTMENT (OUTPATIENT)
Dept: CT IMAGING | Facility: IMAGING CENTER | Age: 79
End: 2019-07-01
Payer: COMMERCIAL

## 2019-07-01 DIAGNOSIS — J39.8 OTHER SPECIFIED DISEASES OF UPPER RESPIRATORY TRACT: ICD-10-CM

## 2019-07-01 PROCEDURE — 71250 CT THORAX DX C-: CPT

## 2019-07-01 PROCEDURE — 71250 CT THORAX DX C-: CPT | Mod: 26

## 2019-07-03 ENCOUNTER — APPOINTMENT (OUTPATIENT)
Dept: WOUND CARE | Facility: CLINIC | Age: 79
End: 2019-07-03
Payer: COMMERCIAL

## 2019-07-03 VITALS
HEART RATE: 73 BPM | RESPIRATION RATE: 18 BRPM | TEMPERATURE: 97.7 F | DIASTOLIC BLOOD PRESSURE: 84 MMHG | SYSTOLIC BLOOD PRESSURE: 137 MMHG

## 2019-07-03 PROCEDURE — 29581 APPL MULTLAYER CMPRN SYS LEG: CPT | Mod: RT

## 2019-07-06 NOTE — ASSESSMENT
[FreeTextEntry1] : 6/5/19\par \par The patient presents with a wound to the right calf.  minimal Swelling noted to the extremity.  wound cleaned and debrided mechanically.\par Wound culture obtained. physicians access referral given for plastics \par \par Wound continues to decrease, saw plastics, wound too small for intervention\par Plan - Chelsea, foam, Unna\par Has home health aide to change dressing\par Recommend skin sub\par

## 2019-07-06 NOTE — PHYSICAL EXAM
[2+] : right 2+ [Ankle Swelling (On Exam)] : present [Ankle Swelling On The Right] : mild [] : of the right leg [Ankle Swelling On The Left] : moderate [Alert] : alert [Oriented to Person] : oriented to person [Oriented to Place] : oriented to place [Oriented to Time] : oriented to time [Calm] : calm [4 x 4] : 4 x 4  [JVD] : no jugular venous distention  [Abdomen Tenderness] : ~T ~M No abdominal tenderness [de-identified] : overweight, ambulatory, well groomed [de-identified] : non labored [de-identified] :  ambulatory [de-identified] : 2 [FreeTextEntry1] : right lower calf  [FreeTextEntry2] : 0.8 [FreeTextEntry3] : 0.5 [FreeTextEntry4] : 0.2 cm [de-identified] : aidan

## 2019-07-06 NOTE — HISTORY OF PRESENT ILLNESS
[FreeTextEntry1] : Mr. CLAYTON SALAZAR is a 78 year who presents for evaluation of  right lower extremity leg pain and nonhealing right leg ulceration for over one year duration.  .s/p RFA to right LE.   patient states that he had recurrent ulcer after the wound had healed when he used Viagra and Cialis together.  He then developed a leg rash and swelling to the legs.\par Patient going to [place] this week.\par \par  pt stated the wound stared off as a Basal cell which was excised by his dermatologist with clear margins and felt that the wound was not getting any better due to his varicose veins. Pt has no fevers or chills today he complains of pain in the wound. pt is currently wearing compression stocking.  He has been off prednisone\par Patient's leg discomfort is associated with  leg swelling, fatigue, heaviness, achiness. \par Patient's symptoms have persisted despite conservative management with leg elevation, exercise, attempted weight loss, over-the-counter medications.\par Patient's symptoms are aggravated by weight bearing, prolonged standing, prolonged sitting.\par \par 9/5 [facility] for right leg ablation 9/18\par VN 3x week\par Patient's symptoms are alleviated by  leg elevation, exercise. \par Patient's symptoms interfere with the patient's  ADLs such as work, shopping and housekeeping.  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 \par 6/19/19- per patient Plastics did not advise use of STSG- wound "too small"\par Leaving for [place] 7/5/19\par Has an Aide- re COPD, no VN\[facility] [place]\par \par

## 2019-07-17 ENCOUNTER — APPOINTMENT (OUTPATIENT)
Dept: PULMONOLOGY | Facility: CLINIC | Age: 79
End: 2019-07-17
Payer: COMMERCIAL

## 2019-07-17 ENCOUNTER — APPOINTMENT (OUTPATIENT)
Dept: WOUND CARE | Facility: CLINIC | Age: 79
End: 2019-07-17
Payer: COMMERCIAL

## 2019-07-17 VITALS
BODY MASS INDEX: 35.92 KG/M2 | DIASTOLIC BLOOD PRESSURE: 70 MMHG | HEART RATE: 83 BPM | WEIGHT: 271 LBS | SYSTOLIC BLOOD PRESSURE: 138 MMHG | OXYGEN SATURATION: 96 % | RESPIRATION RATE: 18 BRPM | HEIGHT: 73 IN

## 2019-07-17 PROCEDURE — 96372 THER/PROPH/DIAG INJ SC/IM: CPT

## 2019-07-17 PROCEDURE — 29581 APPL MULTLAYER CMPRN SYS LEG: CPT | Mod: RT

## 2019-07-17 RX ORDER — OMALIZUMAB 150 MG/ML
150 INJECTION, SOLUTION SUBCUTANEOUS
Qty: 0 | Refills: 0 | Status: COMPLETED | OUTPATIENT
Start: 2019-07-17

## 2019-07-17 RX ORDER — OMALIZUMAB 75 MG/.5ML
75 INJECTION, SOLUTION SUBCUTANEOUS
Qty: 0 | Refills: 0 | Status: COMPLETED | OUTPATIENT
Start: 2019-07-17

## 2019-07-17 RX ADMIN — OMALIZUMAB 75 MG/0.5ML: 75 INJECTION, SOLUTION SUBCUTANEOUS at 00:00

## 2019-07-17 RX ADMIN — OMALIZUMAB 150 MG/ML: 150 INJECTION, SOLUTION SUBCUTANEOUS at 00:00

## 2019-07-17 NOTE — PHYSICAL EXAM
[2+] : right 2+ [Ankle Swelling (On Exam)] : present [Ankle Swelling On The Right] : mild [] : of the right leg [Ankle Swelling On The Left] : moderate [Alert] : alert [Oriented to Person] : oriented to person [Oriented to Place] : oriented to place [Oriented to Time] : oriented to time [Calm] : calm [4 x 4] : 4 x 4  [JVD] : no jugular venous distention  [Abdomen Tenderness] : ~T ~M No abdominal tenderness [de-identified] : overweight, ambulatory, well groomed [de-identified] : non labored [de-identified] :  ambulatory [de-identified] : 2 [FreeTextEntry1] : right lower calf  [FreeTextEntry2] : 2.5 [FreeTextEntry3] : 1.5 [FreeTextEntry4] : 0.2 cm [de-identified] : aidan

## 2019-07-17 NOTE — ASSESSMENT
[FreeTextEntry1] : \par The patient presents with a wound to the right calf.  minimal Swelling noted to the extremity.  wound cleaned and debrided mechanically.\par \par \par Wound continues to decrease, saw plastics, wound too small for intervention\par Plan - Chelsea, foam, Unna\par Has home health aide to change dressing\par Recommend skin sub\par 7/17/19 Patient returned from vacation, wound has increased in  size possibly due to trauma.\par Plan for skin sub (Or) Plan for re application of skin sub.  Wound has shown improvement through (increased granulation.\par \par Wound is free from infection, drainage and necrotic tissue.  Patient has adequate blood supply as demonstrated by palpable pulses and an ROSARIO of 1.  \par \par Wound has been treated with standards of care for 4+ months including compression, offloading, debridement’s.\par \par

## 2019-07-17 NOTE — HISTORY OF PRESENT ILLNESS
[FreeTextEntry1] : Mr. CLAYTON SALAZAR is a 78 year who presents for evaluation of  right lower extremity leg pain and nonhealing right leg ulceration for over one year duration.  .s/p RFA to right LE.   patient states that he had recurrent ulcer after the wound had healed when he used Viagra and Cialis together.  He then developed a leg rash and swelling to the legs.\par Patient returned from [place] to visit his family\par \par  pt stated the wound stared off as a Basal cell which was excised by his dermatologist with clear margins and felt that the wound was not getting any better due to his varicose veins. Pt has no fevers or chills today he complains of pain in the wound. pt is currently wearing compression stocking.  He has been off prednisone\par Patient's leg discomfort is associated with  leg swelling, fatigue, heaviness, achiness. \par Patient's symptoms have persisted despite conservative management with leg elevation, exercise, attempted weight loss, over-the-counter medications.\par Patient's symptoms are aggravated by weight bearing, prolonged standing, prolonged sitting.\par \par 9/5 [facility] for right leg ablation 9/18\par VN 3x week\par Patient's symptoms are alleviated by  leg elevation, exercise. \par Patient's symptoms interfere with the patient's  ADLs such as work, shopping and housekeeping.  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 \par 6/19/19- per patient Plastics did not advise use of STSG- wound "too small"\par \par Has an Aide- re COPD, no VN\[facility] [place]\par \par

## 2019-07-24 ENCOUNTER — APPOINTMENT (OUTPATIENT)
Dept: WOUND CARE | Facility: CLINIC | Age: 79
End: 2019-07-24
Payer: COMMERCIAL

## 2019-07-24 DIAGNOSIS — I87.2 VENOUS INSUFFICIENCY (CHRONIC) (PERIPHERAL): ICD-10-CM

## 2019-07-24 DIAGNOSIS — S81.801A UNSPECIFIED OPEN WOUND, RIGHT LOWER LEG, INITIAL ENCOUNTER: ICD-10-CM

## 2019-07-24 PROCEDURE — 29581 APPL MULTLAYER CMPRN SYS LEG: CPT | Mod: RT

## 2019-07-24 NOTE — ASSESSMENT
[FreeTextEntry1] : \par The patient presents with a wound to the right calf.  minimal Swelling noted to the extremity.  wound cleaned and debrided mechanically.\par \par \par Wound continues to decrease, saw plastics, wound too small for intervention\par Plan - Chelsea, foam, Unna\par Has home health aide to change dressing\par Recommend skin sub\par 7/17/19 Patient returned from vacation, wound has increased in  size possibly due to trauma.\par Plan for skin sub (Or) Plan for re application of skin sub.  Wound has shown improvement through (increased granulation.\par \par Wound is free from infection, drainage and necrotic tissue.  Patient has adequate blood supply as demonstrated by palpable pulses and an ROSARIO of 1.  \par \par Wound has been treated with standards of care for 4+ months including compression, offloading, debridement’s.\par \par Primatrix number 1 applied today to patient’s right posterior calf.   Wound bed debrided of bioburden and prepped for product application.  1 of pieces and original size of product) obtained.  Total product usage was 8.12 sq. cm), Total waste 0.88sq. cm) due to wound size.  Product secured with (steri strips and bolster dressing).\par Patient to f/u in 1 week.\par \par

## 2019-07-24 NOTE — PHYSICAL EXAM
[2+] : right 2+ [Ankle Swelling (On Exam)] : present [Ankle Swelling On The Right] : of the right ankle [] : of the right leg [Ankle Swelling On The Left] : moderate [Alert] : alert [Oriented to Place] : oriented to place [Oriented to Person] : oriented to person [Calm] : calm [Oriented to Time] : oriented to time [4 x 4] : 4 x 4  [Abdomen Tenderness] : ~T ~M No abdominal tenderness [JVD] : no jugular venous distention  [de-identified] : overweight, ambulatory, well groomed [de-identified] : non labored [de-identified] :  ambulatory [FreeTextEntry1] : right lower calf  [de-identified] : 2 [FreeTextEntry3] : 2.3 [FreeTextEntry2] : 3.4 [FreeTextEntry4] : 0.2 cm [de-identified] : primatrix [de-identified] : 2.5/1.5/0.2 [FreeTextEntry8] : 0.6 [FreeTextEntry7] : anterior  [FreeTextEntry9] : 0.5 [de-identified] : primatrix [de-identified] : 0.1

## 2019-07-24 NOTE — HISTORY OF PRESENT ILLNESS
[FreeTextEntry1] : Mr. CLAYTON SALAZAR is a 78 year who presents for evaluation of  right lower extremity leg pain and nonhealing right leg ulceration for over one year duration.  .s/p RFA to right LE.   patient states that he had recurrent ulcer after the wound had healed when he used Viagra and Cialis together.  He then developed a leg rash and swelling to the legs.\par Patient returned from [place] to visit his family\par \par  pt stated the wound stared off as a Basal cell which was excised by his dermatologist with clear margins and felt that the wound was not getting any better due to his varicose veins. Pt has no fevers or chills today he complains of pain in the wound. pt is currently wearing compression stocking.  He has been off prednisone\par Patient's leg discomfort is associated with  leg swelling, fatigue, heaviness, achiness. \par Patient's symptoms have persisted despite conservative management with leg elevation, exercise, attempted weight loss, over-the-counter medications.\par Patient's symptoms are aggravated by weight bearing, prolonged standing, prolonged sitting.\par \par 9/5 [facility] for right leg ablation 9/18\par VN 3x week\par Patient's symptoms are alleviated by  leg elevation, exercise. \par Patient's symptoms interfere with the patient's  ADLs such as work, shopping and housekeeping.  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 \par 6/19/19- per patient Plastics did not advise use of STSG- wound "too small"\par \par Has an Aide- re COPD, no VN\[facility] [place]\par \par

## 2019-07-31 ENCOUNTER — APPOINTMENT (OUTPATIENT)
Dept: WOUND CARE | Facility: CLINIC | Age: 79
End: 2019-07-31
Payer: COMMERCIAL

## 2019-07-31 VITALS
DIASTOLIC BLOOD PRESSURE: 91 MMHG | BODY MASS INDEX: 35.75 KG/M2 | HEART RATE: 71 BPM | SYSTOLIC BLOOD PRESSURE: 131 MMHG | TEMPERATURE: 97.9 F

## 2019-07-31 PROCEDURE — 29581 APPL MULTLAYER CMPRN SYS LEG: CPT | Mod: RT

## 2019-08-02 ENCOUNTER — APPOINTMENT (OUTPATIENT)
Dept: PULMONOLOGY | Facility: CLINIC | Age: 79
End: 2019-08-02
Payer: COMMERCIAL

## 2019-08-02 VITALS
WEIGHT: 267 LBS | RESPIRATION RATE: 16 BRPM | HEART RATE: 93 BPM | OXYGEN SATURATION: 96 % | BODY MASS INDEX: 35.39 KG/M2 | HEIGHT: 73 IN | SYSTOLIC BLOOD PRESSURE: 130 MMHG | DIASTOLIC BLOOD PRESSURE: 60 MMHG

## 2019-08-02 PROCEDURE — 99214 OFFICE O/P EST MOD 30 MIN: CPT | Mod: 25

## 2019-08-02 PROCEDURE — 94729 DIFFUSING CAPACITY: CPT

## 2019-08-02 PROCEDURE — 94060 EVALUATION OF WHEEZING: CPT

## 2019-08-02 PROCEDURE — 96401 CHEMO ANTI-NEOPL SQ/IM: CPT | Mod: 59

## 2019-08-02 PROCEDURE — ZZZZZ: CPT

## 2019-08-02 RX ORDER — OMALIZUMAB 75 MG/.5ML
75 INJECTION, SOLUTION SUBCUTANEOUS
Qty: 0 | Refills: 0 | Status: COMPLETED | OUTPATIENT
Start: 2019-08-02

## 2019-08-02 RX ORDER — OMALIZUMAB 150 MG/ML
150 INJECTION, SOLUTION SUBCUTANEOUS
Qty: 0 | Refills: 0 | Status: COMPLETED | OUTPATIENT
Start: 2019-08-02

## 2019-08-02 RX ADMIN — OMALIZUMAB 0 MG/0.5ML: 75 INJECTION, SOLUTION SUBCUTANEOUS at 00:00

## 2019-08-02 RX ADMIN — OMALIZUMAB 0 MG/ML: 150 INJECTION, SOLUTION SUBCUTANEOUS at 00:00

## 2019-08-02 NOTE — PROCEDURE
[FreeTextEntry1] : FENO was 24; a normal value being less than 25\par Fractional exhaled nitric oxide (FENO) is regarded as a simple, noninvasive method for assessing eosinophilic airway inflammation. Produced by a variety of cells within the lung, nitric oxide (NO) concentrations are generally low in healthy individuals. However, high concentrations of NO appear to be involved in nonspecific host defense mechanisms and chronic inflammatory diseases such as asthma. The American Thoracic Society (ATS) therefore has recommended using FENO to aid in the diagnosis and monitoring of eosinophilic airway inflammation and asthma, and for identifying steroid responsive individuals whose chronic respiratory symptoms may be caused by airway inflammation. \par \par Chest CT (7.1.19) reveals no evidence of tracheobronchomalacia. No change since prior.

## 2019-08-02 NOTE — ADDENDUM
[FreeTextEntry1] : Documented by Curtis Tee acting as a scribe for Dr. Jason Zeng on 08/02/2019.\par \par All medical record entries made by the Scribe were at my, Dr. Jason Zeng's, direction and personally dictated by me on 08/02/2019. I have reviewed the chart and agree that the record accurately reflects my personal performance of the history, physical exam, assessment and plan. I have also personally directed, reviewed, and agree with the discharge instructions.

## 2019-08-02 NOTE — ASSESSMENT
[FreeTextEntry1] : Mr. Jimenez is a 78 year old male with a history of asthma, allergies, GERD, OSAS, HTN, HLD, hypothyroidism, bladder/prostate issue; anxiety/depression, non-smoker, now coming back for pulmonary re-evaluation. He is presenting today with purposeful weight loss.\par \par His shortness of breath is felt to be multifactorial due to:\par -severe persistent asthma, controlled \par -poor breathing mechanics\par -emphysema\par -overweight \par -out of shape\par -? CAD \par -? tracheobronchomalacia (50%)\par \par problem 1: severe persistent asthma / COPD / emphysema (stable)\par -continue DuoNeb by the nebulizer TID to QiD\par -continue Symbicort 160 2 inhalations BID then Qvar 80 at 2 puffs BID\par -Continue Incruse 1 inhalation QD \par -continue to use Singulair 10 mg QHS \par -continue Daliresp 500 mg QD\par \par -Asthma is believed to be caused by inherited (genetic) and environmental factor, but its exact cause is unknown. Asthma may be triggered by allergens, lung infections, or irritants in the air. Asthma triggers are different for each person\par -Inhaler technique reviewed as well as oral hygiene techniques reviewed with patient. Avoidance of cold air, extremes of temperature, rescue inhaler should be used before exercise. Order of medication reviewed with patient. Recommended use of a cool mist humidifier in the bedroom. \par \par problem 2: poor breathing mechanics\par -Proper breathing techniques were reviewed with an emphasis of exhalation. Patient instructed to breath in for 1 second and out for four seconds. Patient was encouraged to not talk while walking. \par \par problem 3: abnormal chest CT/ r/o TBM (50%)\par -c/w mucus plugging\par -recommended to add acapella device to be done multiple times daily \par -follow up chest CT due June 2019\par \par problem 4: allergic rhinitis\par -recommended to try Navage nasal system \par -add Astelin 0.15% 1 inhalation each nostril BID \par -continue Clarinex 5 mg QHS \par -Continue Qnasl 1 sniff/nostril BID\par -Recommending Zaditor 1 eye drop BID\par \par -Environmental measures for allergies were encouraged including mattress and pillow cover, air purifier, and environmental controls.\par \par problem 5: r/o ABPA \par -Based on blood work (not present)\par \par problem 6: obesity \par -recommended to read 'Bright Spots and Landmines' by author Toi Gillette and 'Obesity Code' by Jason Reis \par -Weight loss, exercise, and diet control were discussed and are highly encouraged. Treatment options were given such as, aqua therapy, and contacting a nutritionist. Recommended to use the elliptical, stationary bike, less use of treadmill. Obesity is associated with worsening asthma, shortness of breath, and potential for cardiac disease, diabetes, and other underlying medical conditions.\par \par problem 7: OSAS (stable now)\par -continue CPAP (increased pressure necessary)- compliant\par -Sleep apnea is associated with adverse clinical consequences which an affect most organ systems. Cardiovascular disease risk includes arrhythmias, atrial fibrillation, hypertension, coronary artery disease, and stroke. Metabolic disorders include diabetes type 2, non-alcoholic fatty liver disease. Mood disorder especially depression; and cognitive decline especially in the elderly. Associations with chronic reflux/Mancera’s esophagus some but not all inclusive. \par -Reasons to assess this include arousal consistent with hypopnea; respiratory events most prominent in REM sleep or supine position; therefore sleep staging and body position are important for accurate diagnosis and estimation of AHI. \par \par Problem 8: elevated IgE\par -Received Xolair today in RUE\par -Xolair is a recombinant DNA- derived humanized IgG1K monoclonal antibody that selectively binds to human immunoglobulin E (IgE). Xolair is produced by a Chinese hamster ovary cell suspension culture in nutrient medium containing the antibiotic gentamicin. Gentamicin is not detectable in the final product. Xolair is a sterile, white, preservative free, lyophilized powder contained in a single use vial that is reconstituted with sterile water for suspension. Side effects include: wheezing, tightness of the chest, trouble breathing, hives, skin rash, feeling anxious or light-headed, fainting, warmth or tingling under skin, or swelling of face, lips, or tongue \par \par problem 9: poor balance\par -balance therapy\par \par problem 10: health maintenance \par -s/p influenza vaccination 2018\par -recommended strep pneumonia vaccines: Prevnar-13 vaccine, followed by Pneumo vaccine 23 one year following\par -recommended early intervention for URIs\par -recommended regular osteoporosis evaluations\par -recommended early dermatological evaluations\par -recommended after the age of 50 to the age of 70, colonoscopy every 5 years \par  \par F/U in 3 months\par He is encouraged to call with any changes, concerns, or questions. \par

## 2019-08-02 NOTE — HISTORY OF PRESENT ILLNESS
[FreeTextEntry1] : Mr. Jimenez is a 79 year old male presenting to the office for a follow up visit for abnormal chest CT, elevated IgE level, severe asthma, KHADRA, postnasal drip, poor balance, snoring and shortness of breath. His chief complaint is SOB.\par -he reports having lost 30 pounds using the wheat belly diet\par -he notes usually having post nasal drip and sputum in his throat\par -he notes having pink eye recently\par -he note shaving itchy eyes constantly\par -He notes His bowels are regular\par -he reports he recently went to [place] and used a wheelchair part of the time\par -he notes he has SOB upon exertion\par -he reports he snores, and notes he has been snoring in a new way\par -he denies any SOB while supine or at night, wheezing, chest pain, chest pressure, diarrhea, constipation, dysphagia, dizziness, sour taste in the mouth, leg swelling, leg pain, itchy ears, heartburn, reflux

## 2019-08-02 NOTE — PHYSICAL EXAM
[General Appearance - Well Developed] : well developed [Normal Appearance] : normal appearance [Well Groomed] : well groomed [General Appearance - Well Nourished] : well nourished [No Deformities] : no deformities [General Appearance - In No Acute Distress] : no acute distress [Normal Conjunctiva] : the conjunctiva exhibited no abnormalities [Eyelids - No Xanthelasma] : the eyelids demonstrated no xanthelasmas [Normal Oropharynx] : normal oropharynx [III] : III [Neck Appearance] : the appearance of the neck was normal [Neck Cervical Mass (___cm)] : no neck mass was observed [Jugular Venous Distention Increased] : there was no jugular-venous distention [Thyroid Diffuse Enlargement] : the thyroid was not enlarged [Thyroid Nodule] : there were no palpable thyroid nodules [Heart Rate And Rhythm] : heart rate and rhythm were normal [Murmurs] : no murmurs present [Heart Sounds] : normal S1 and S2 [Respiration, Rhythm And Depth] : normal respiratory rhythm and effort [Exaggerated Use Of Accessory Muscles For Inspiration] : no accessory muscle use [Auscultation Breath Sounds / Voice Sounds] : lungs were clear to auscultation bilaterally [Abdomen Soft] : soft [Abdomen Tenderness] : non-tender [Abnormal Walk] : normal gait [Abdomen Mass (___ Cm)] : no abdominal mass palpated [Gait - Sufficient For Exercise Testing] : the gait was sufficient for exercise testing [Nail Clubbing] : no clubbing of the fingernails [Cyanosis, Localized] : no localized cyanosis [Petechial Hemorrhages (___cm)] : no petechial hemorrhages [Skin Color & Pigmentation] : normal skin color and pigmentation [] : no rash [No Venous Stasis] : no venous stasis [Skin Lesions] : no skin lesions [No Skin Ulcers] : no skin ulcer [No Xanthoma] : no  xanthoma was observed [Deep Tendon Reflexes (DTR)] : deep tendon reflexes were 2+ and symmetric [Sensation] : the sensory exam was normal to light touch and pinprick [No Focal Deficits] : no focal deficits [Oriented To Time, Place, And Person] : oriented to person, place, and time [Impaired Insight] : insight and judgment were intact [Affect] : the affect was normal [FreeTextEntry2] : 1+ LE edema  [FreeTextEntry1] : I:E 1:3, clear

## 2019-08-04 NOTE — PHYSICAL EXAM
[2+] : right 2+ [Ankle Swelling (On Exam)] : present [Ankle Swelling On The Right] : mild [] : of the right leg [Ankle Swelling On The Left] : moderate [Alert] : alert [Oriented to Person] : oriented to person [Oriented to Place] : oriented to place [Oriented to Time] : oriented to time [Calm] : calm [4 x 4] : 4 x 4  [JVD] : no jugular venous distention  [Abdomen Tenderness] : ~T ~M No abdominal tenderness [de-identified] : overweight, ambulatory, well groomed [de-identified] : non labored [de-identified] :  ambulatory [de-identified] : 2 [FreeTextEntry1] : right lower calf  [FreeTextEntry2] : 3.4 [FreeTextEntry3] : 2.3 [FreeTextEntry4] : 0.2 cm [de-identified] : aidan [de-identified] : 3.4/2.3/0.2 [FreeTextEntry7] : anterior  [FreeTextEntry8] : 0.6 [FreeTextEntry9] : 0.5 [de-identified] : 0.1 [de-identified] : primatrix

## 2019-08-04 NOTE — ASSESSMENT
[FreeTextEntry1] : \par The patient presents with a wound to the right calf.  minimal Swelling noted to the extremity.  wound cleaned and debrided mechanically.\par \par \par Wound continues to decrease, saw plastics, wound too small for intervention\par Plan - Chelsea, foam, Unna\par Has home health aide to change dressing\par Recommend skin sub\par 7/17/19 Patient returned from vacation, wound has increased in  size possibly due to trauma.\par Plan for skin sub (Or) Plan for re application of skin sub.  Wound has shown improvement through (increased granulation.\par \par Wound is free from infection, drainage and necrotic tissue.  Patient has adequate blood supply as demonstrated by palpable pulses and an ROSARIO of 1.  \par \par Wound has been treated with standards of care for 4+ months including compression, offloading, debridement’s.\par No change in size, clean\par Plan - cellutome requested, chelsea over wound, unna, wife does unna change\par Patient to f/u in 1 week.\par \par

## 2019-08-04 NOTE — HISTORY OF PRESENT ILLNESS
[FreeTextEntry1] : Mr. CLAYTON SALAZAR is a 78 year who presents for evaluation of  right lower extremity leg pain and nonhealing right leg ulceration for over one year duration.  .s/p RFA to right LE.   patient states that he had recurrent ulcer after the wound had healed when he used Viagra and Cialis together.  He then developed a leg rash and swelling to the legs.\par Patient returned from [place] to visit his family\par \par  pt stated the wound stared off as a Basal cell which was excised by his dermatologist with clear margins and felt that the wound was not getting any better due to his varicose veins. Pt has no fevers or chills today he complains of pain in the wound. pt is currently wearing compression stocking.  He has been off prednisone\par Patient's leg discomfort is associated with  leg swelling, fatigue, heaviness, achiness. \par Patient's symptoms have persisted despite conservative management with leg elevation, exercise, attempted weight loss, over-the-counter medications.\par Patient's symptoms are aggravated by weight bearing, prolonged standing, prolonged sitting.\par \par 9/5 [facility] for right leg ablation 9/18\par VN 3x week\par Patient's symptoms are alleviated by  leg elevation, exercise. \par Patient's symptoms interfere with the patient's  ADLs such as work, shopping and housekeeping.  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 \par 6/19/19- per patient Plastics did not advise use of STSG- wound "too small"\par \par Has an Aide- re COPD, no VN\[facility] [place]\par \par

## 2019-08-07 ENCOUNTER — APPOINTMENT (OUTPATIENT)
Dept: WOUND CARE | Facility: CLINIC | Age: 79
End: 2019-08-07
Payer: COMMERCIAL

## 2019-08-07 VITALS
DIASTOLIC BLOOD PRESSURE: 77 MMHG | SYSTOLIC BLOOD PRESSURE: 139 MMHG | BODY MASS INDEX: 35.23 KG/M2 | HEART RATE: 73 BPM | TEMPERATURE: 97.8 F

## 2019-08-07 PROCEDURE — 29581 APPL MULTLAYER CMPRN SYS LEG: CPT | Mod: RT

## 2019-08-07 NOTE — HISTORY OF PRESENT ILLNESS
[FreeTextEntry1] : Mr. CLAYTON SALAZAR is a 78 year who presents for evaluation of  right lower extremity leg pain and nonhealing right leg ulceration for over one year duration.  .s/p RFA to right LE.   patient states that he had recurrent ulcer after the wound had healed when he used Viagra and Cialis together.  He then developed a leg rash and swelling to the legs.\par Patient returned from [place] to visit his family\par \par  pt stated the wound stared off as a Basal cell which was excised by his dermatologist with clear margins and felt that the wound was not getting any better due to his varicose veins. Pt has no fevers or chills today he complains of pain in the wound. pt is currently wearing compression stocking.  He has been off prednisone\par Patient's leg discomfort is associated with  leg swelling, fatigue, heaviness, achiness. \par Patient's symptoms have persisted despite conservative management with leg elevation, exercise, attempted weight loss, over-the-counter medications.\par Patient's symptoms are aggravated by weight bearing, prolonged standing, prolonged sitting.\par \par 9/5 [facility] for right leg ablation 9/18\par VN 3x week\par Patient's symptoms are alleviated by  leg elevation, exercise. \par Patient's symptoms interfere with the patient's  ADLs such as work, shopping and housekeeping.  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 \par 6/19/19- per patient Plastics did not advise use of STSG- wound "too small"\par \par Has an Aide- re COPD, no VN\[facility] [place]\par \par

## 2019-08-07 NOTE — PHYSICAL EXAM
[Ankle Swelling (On Exam)] : present [2+] : right 2+ [Ankle Swelling On The Right] : mild [] : of the right leg [Ankle Swelling On The Left] : moderate [Alert] : alert [Oriented to Place] : oriented to place [Oriented to Person] : oriented to person [Oriented to Time] : oriented to time [Calm] : calm [4 x 4] : 4 x 4  [JVD] : no jugular venous distention  [Abdomen Tenderness] : ~T ~M No abdominal tenderness [de-identified] : overweight, ambulatory, well groomed [de-identified] : non labored [de-identified] :  ambulatory [de-identified] : 2 [FreeTextEntry1] : right lower calf  [FreeTextEntry2] : 2.8 [FreeTextEntry3] : 1 [FreeTextEntry4] : 0.2 cm [de-identified] : unna boot  [FreeTextEntry5] : 4x4 [de-identified] : aidan [de-identified] : 3.4/2.3/0.2 [FreeTextEntry7] : anterior  [FreeTextEntry8] : 0.6 [FreeTextEntry9] : 0.5 [de-identified] : 0.1 [de-identified] : primatrix

## 2019-08-07 NOTE — ASSESSMENT
[Verbal] : Verbal [Written] : Written [Patient] : Patient [Good - alert, interested, motivated] : Good - alert, interested, motivated [Verbalizes knowledge/Understanding] : Verbalizes knowledge/understanding [Dressing changes] : dressing changes [Foot Care] : foot care [Skin Care] : skin care [Signs and symptoms of infection] : sign and symptoms of infection [Pressure relief] : pressure relief [Nutrition] : nutrition [Home] : Home [Stable] : stable [Ambulatory] : Ambulatory [Not Applicable - Long Term Care/Home Health Agency] : Long Term Care/Home Health Agency: Not Applicable [FreeTextEntry1] : \par The patient presents with a wound to the right calf.  minimal Swelling noted to the extremity.  wound cleaned and debrided mechanically.\par \par \par Wound continues to decrease, saw plastics, wound too small for intervention\par Plan - Chelsea, foam, Unna\par Has home health aide to change dressing\par Recommend skin sub\par 7/17/19 Patient returned from vacation, wound has increased in  size possibly due to trauma.\par Plan for skin sub (Or) Plan for re application of skin sub.  Wound has shown improvement through (increased granulation.\par \par Wound is free from infection, drainage and necrotic tissue.  Patient has adequate blood supply as demonstrated by palpable pulses and an ROSARIO of 1.  \par \par Wound has been treated with standards of care for 4+ months including compression, offloading, debridement’s.\par No change in size, clean\par Plan - cellutome requested, awaiting insurance approval, chelsea over wound, unna, wife does unna change\par Patient to f/u in 1 week.\par \par

## 2019-08-14 ENCOUNTER — APPOINTMENT (OUTPATIENT)
Dept: WOUND CARE | Facility: CLINIC | Age: 79
End: 2019-08-14
Payer: COMMERCIAL

## 2019-08-14 PROCEDURE — 29581 APPL MULTLAYER CMPRN SYS LEG: CPT | Mod: RT

## 2019-08-14 NOTE — HISTORY OF PRESENT ILLNESS
[FreeTextEntry1] : Mr. CLAYTON SALAZAR is a 78 year who presents for evaluation of  right lower extremity leg pain and nonhealing right leg ulceration for over one year duration.  .s/p RFA to right LE.   patient states that he had recurrent ulcer after the wound had healed when he used Viagra and Cialis together.  He then developed a leg rash and swelling to the legs.\par Patient returned from [place] to visit his family\par \par  pt stated the wound stared off as a Basal cell which was excised by his dermatologist with clear margins and felt that the wound was not getting any better due to his varicose veins. Pt has no fevers or chills today he complains of pain in the wound. pt is currently wearing compression stocking.  He has been off prednisone\par Patient's leg discomfort is associated with  leg swelling, fatigue, heaviness, achiness. \par Patient's symptoms have persisted despite conservative management with leg elevation, exercise, attempted weight loss, over-the-counter medications.\par Patient's symptoms are aggravated by weight bearing, prolonged standing, prolonged sitting.\par \par 9/5 [facility] for right leg ablation 9/18\par VN 3x week\par Patient's symptoms are alleviated by  leg elevation, exercise. \par Patient's symptoms interfere with the patient's  ADLs such as work, shopping and housekeeping.  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 \par 6/19/19- per patient Plastics did not advise use of STSG- wound "too small"\par \par Has an Aide- re COPD, no VN\[facility] [place]\par \par

## 2019-08-14 NOTE — ASSESSMENT
[Verbal] : Verbal [Written] : Written [Good - alert, interested, motivated] : Good - alert, interested, motivated [Patient] : Patient [Verbalizes knowledge/Understanding] : Verbalizes knowledge/understanding [Dressing changes] : dressing changes [Skin Care] : skin care [Foot Care] : foot care [Signs and symptoms of infection] : sign and symptoms of infection [Pressure relief] : pressure relief [Nutrition] : nutrition [Stable] : stable [Home] : Home [Ambulatory] : Ambulatory [Not Applicable - Long Term Care/Home Health Agency] : Long Term Care/Home Health Agency: Not Applicable [FreeTextEntry1] : \par The patient presents with a wound to the right calf.  minimal Swelling noted to the extremity.  wound cleaned and debrided mechanically.\par \par \par Wound continues to decrease, saw plastics, wound too small for intervention\par Plan - Chelsea, foam, Unna\par Has home health aide to change dressing\par Recommend skin sub\par 7/17/19 Patient returned from vacation, wound has increased in  size possibly due to trauma.\par Plan for skin sub (Or) Plan for re application of skin sub.  Wound has shown improvement through (increased granulation.\par \par Wound is free from infection, drainage and necrotic tissue.  Patient has adequate blood supply as demonstrated by palpable pulses and an ROSARIO of 1.  \par \par Wound has been treated with standards of care for 4+ months including compression, offloading, debridement’s.\par No change in size, clean\par Plan - cellutome requested, awaiting insurance approval, chelsea over wound, unna, wife does unna change\par Patient to f/u in 1 week.\par \par

## 2019-08-14 NOTE — PHYSICAL EXAM
[Ankle Swelling (On Exam)] : present [2+] : right 2+ [Ankle Swelling On The Right] : of the right ankle [] : of the right leg [Ankle Swelling On The Left] : moderate [Alert] : alert [Oriented to Person] : oriented to person [Oriented to Place] : oriented to place [Oriented to Time] : oriented to time [Calm] : calm [4 x 4] : 4 x 4  [JVD] : no jugular venous distention  [Abdomen Tenderness] : ~T ~M No abdominal tenderness [de-identified] : overweight, ambulatory, well groomed [de-identified] : non labored [de-identified] :  ambulatory [FreeTextEntry1] : right lower calf  [FreeTextEntry2] : 1 [FreeTextEntry3] : 1 [FreeTextEntry5] : 4x4 [FreeTextEntry4] : 0.1 cm [de-identified] : unna boot  [de-identified] : aidan [FreeTextEntry7] : anterior  [FreeTextEntry9] : 0 [FreeTextEntry8] : 0 [de-identified] : 0 [de-identified] : 0

## 2019-08-16 ENCOUNTER — APPOINTMENT (OUTPATIENT)
Dept: PULMONOLOGY | Facility: CLINIC | Age: 79
End: 2019-08-16
Payer: COMMERCIAL

## 2019-08-16 VITALS
BODY MASS INDEX: 35.39 KG/M2 | RESPIRATION RATE: 16 BRPM | DIASTOLIC BLOOD PRESSURE: 60 MMHG | HEIGHT: 73 IN | HEART RATE: 81 BPM | OXYGEN SATURATION: 96 % | SYSTOLIC BLOOD PRESSURE: 132 MMHG | WEIGHT: 267 LBS

## 2019-08-16 PROCEDURE — 96401 CHEMO ANTI-NEOPL SQ/IM: CPT

## 2019-08-16 RX ORDER — OMALIZUMAB 75 MG/.5ML
75 INJECTION, SOLUTION SUBCUTANEOUS
Qty: 0 | Refills: 0 | Status: COMPLETED | OUTPATIENT
Start: 2019-08-16

## 2019-08-16 RX ORDER — OMALIZUMAB 150 MG/ML
150 INJECTION, SOLUTION SUBCUTANEOUS
Qty: 0 | Refills: 0 | Status: COMPLETED | OUTPATIENT
Start: 2019-08-16

## 2019-08-16 RX ADMIN — OMALIZUMAB 150 MG/ML: 150 INJECTION, SOLUTION SUBCUTANEOUS at 00:00

## 2019-08-16 RX ADMIN — OMALIZUMAB 75 MG/0.5ML: 75 INJECTION, SOLUTION SUBCUTANEOUS at 00:00

## 2019-08-21 ENCOUNTER — APPOINTMENT (OUTPATIENT)
Dept: WOUND CARE | Facility: CLINIC | Age: 79
End: 2019-08-21
Payer: COMMERCIAL

## 2019-08-21 PROCEDURE — 29581 APPL MULTLAYER CMPRN SYS LEG: CPT | Mod: RT

## 2019-08-21 NOTE — ASSESSMENT
[Verbal] : Verbal [Written] : Written [Patient] : Patient [Good - alert, interested, motivated] : Good - alert, interested, motivated [Verbalizes knowledge/Understanding] : Verbalizes knowledge/understanding [Dressing changes] : dressing changes [Foot Care] : foot care [Skin Care] : skin care [Pressure relief] : pressure relief [Signs and symptoms of infection] : sign and symptoms of infection [Nutrition] : nutrition [Stable] : stable [Home] : Home [Ambulatory] : Ambulatory [Not Applicable - Long Term Care/Home Health Agency] : Long Term Care/Home Health Agency: Not Applicable [FreeTextEntry1] : \par The patient presents with a wound to the right calf.  minimal Swelling noted to the extremity.  wound cleaned and debrided mechanically.\par \par \par \par Wound is healed, cavilon applied\par Plan - will apply unna, pt. to bring in compression sock next week\par

## 2019-08-21 NOTE — HISTORY OF PRESENT ILLNESS
[FreeTextEntry1] : Mr. CLAYTON SALAZAR is a 78 year who presents for evaluation of  right lower extremity leg pain and nonhealing right leg ulceration for over one year duration.  .s/p RFA to right LE.   patient states that he had recurrent ulcer after the wound had healed when he used Viagra and Cialis together.  He then developed a leg rash and swelling to the legs.\par Patient returned from [place] to visit his family\par \par  pt stated the wound stared off as a Basal cell which was excised by his dermatologist with clear margins and felt that the wound was not getting any better due to his varicose veins. Pt has no fevers or chills today he complains of pain in the wound. pt is currently wearing compression stocking.  He has been off prednisone\par Patient's leg discomfort is associated with  leg swelling, fatigue, heaviness, achiness. \par Patient's symptoms have persisted despite conservative management with leg elevation, exercise, attempted weight loss, over-the-counter medications.\par Patient's symptoms are aggravated by weight bearing, prolonged standing, prolonged sitting.\par \par 9/5 [facility] for right leg ablation 9/18\par VN 3x week\par Patient's symptoms are alleviated by  leg elevation, exercise. \par Patient's symptoms interfere with the patient's  ADLs such as work, shopping and housekeeping.  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 \par 6/19/19- per patient Plastics did not advise use of STSG- wound "too small"\par \par Has an Aide- re COPD, no VN\[facility] [place]\par \par

## 2019-08-21 NOTE — PHYSICAL EXAM
[2+] : right 2+ [Ankle Swelling (On Exam)] : present [Ankle Swelling On The Right] : of the right ankle [] : of the right leg [Ankle Swelling On The Left] : moderate [Alert] : alert [Oriented to Person] : oriented to person [Oriented to Place] : oriented to place [Oriented to Time] : oriented to time [Calm] : calm [4 x 4] : 4 x 4  [JVD] : no jugular venous distention  [Abdomen Tenderness] : ~T ~M No abdominal tenderness [de-identified] : overweight, ambulatory, well groomed [de-identified] : non labored [de-identified] :  ambulatory [FreeTextEntry1] : right lower calf healed [de-identified] : unna boot  [FreeTextEntry7] : anterior  [FreeTextEntry8] : 0 [FreeTextEntry9] : 0 [de-identified] : 0 [de-identified] : 0

## 2019-08-28 ENCOUNTER — APPOINTMENT (OUTPATIENT)
Dept: WOUND CARE | Facility: CLINIC | Age: 79
End: 2019-08-28

## 2019-09-04 ENCOUNTER — APPOINTMENT (OUTPATIENT)
Dept: WOUND CARE | Facility: CLINIC | Age: 79
End: 2019-09-04
Payer: COMMERCIAL

## 2019-09-04 ENCOUNTER — APPOINTMENT (OUTPATIENT)
Dept: PULMONOLOGY | Facility: CLINIC | Age: 79
End: 2019-09-04
Payer: COMMERCIAL

## 2019-09-04 VITALS
HEART RATE: 82 BPM | DIASTOLIC BLOOD PRESSURE: 70 MMHG | OXYGEN SATURATION: 96 % | RESPIRATION RATE: 16 BRPM | BODY MASS INDEX: 37.38 KG/M2 | WEIGHT: 276 LBS | SYSTOLIC BLOOD PRESSURE: 120 MMHG | HEIGHT: 72 IN

## 2019-09-04 VITALS
HEART RATE: 72 BPM | BODY MASS INDEX: 35.39 KG/M2 | DIASTOLIC BLOOD PRESSURE: 70 MMHG | SYSTOLIC BLOOD PRESSURE: 164 MMHG | WEIGHT: 267 LBS | RESPIRATION RATE: 16 BRPM | HEIGHT: 73 IN

## 2019-09-04 PROCEDURE — 29581 APPL MULTLAYER CMPRN SYS LEG: CPT | Mod: RT

## 2019-09-04 PROCEDURE — 96401 CHEMO ANTI-NEOPL SQ/IM: CPT

## 2019-09-04 RX ORDER — OMALIZUMAB 150 MG/ML
150 INJECTION, SOLUTION SUBCUTANEOUS
Qty: 0 | Refills: 0 | Status: COMPLETED | OUTPATIENT
Start: 2019-09-04

## 2019-09-04 RX ORDER — OMALIZUMAB 75 MG/.5ML
75 INJECTION, SOLUTION SUBCUTANEOUS
Qty: 0 | Refills: 0 | Status: COMPLETED | OUTPATIENT
Start: 2019-09-04

## 2019-09-04 RX ADMIN — OMALIZUMAB 75 MG/0.5ML: 75 INJECTION, SOLUTION SUBCUTANEOUS at 00:00

## 2019-09-04 RX ADMIN — OMALIZUMAB 150 MG/ML: 150 INJECTION, SOLUTION SUBCUTANEOUS at 00:00

## 2019-09-04 NOTE — PHYSICAL EXAM
[2+] : right 2+ [Ankle Swelling (On Exam)] : present [Ankle Swelling On The Right] : mild [] : of the right leg [Ankle Swelling On The Left] : moderate [Alert] : alert [Oriented to Person] : oriented to person [Oriented to Place] : oriented to place [Oriented to Time] : oriented to time [Calm] : calm [4 x 4] : 4 x 4  [JVD] : no jugular venous distention  [Abdomen Tenderness] : ~T ~M No abdominal tenderness [de-identified] : non labored [de-identified] : overweight, ambulatory, well groomed [de-identified] :  ambulatory [FreeTextEntry1] : right lower calf healed [FreeTextEntry2] : 0 [FreeTextEntry3] : 0 [FreeTextEntry4] : 0 [de-identified] : unna boot  [de-identified] : cavilon [FreeTextEntry7] : anterior  [FreeTextEntry8] : 0 [FreeTextEntry9] : 0 [de-identified] : 0 [de-identified] : 0 [TWNoteComboBox1] : Right [TWNoteComboBox3] : FT [TWNoteComboBox4] : Small [TWNoteComboBox5] : No [TWNoteComboBox6] : Venous [de-identified] : No [de-identified] : Normal [de-identified] : None [de-identified] : None [de-identified] : 100% [de-identified] : No [de-identified] : Multilayer other compression wrap [TWNoteComboBox9] : Right [de-identified] : Multilayer other compression wrap

## 2019-09-04 NOTE — HISTORY OF PRESENT ILLNESS
[FreeTextEntry1] : Mr. CLAYTON SALAZAR is a 78 year who presents for evaluation of  right lower extremity leg pain and nonhealing right leg ulceration for over one year duration.  .s/p RFA to right LE.   patient states that he had recurrent ulcer after the wound had healed when he used Viagra and Cialis together.  He then developed a leg rash and swelling to the legs.\par Patient returned from [place] to visit his family\par \par  pt stated the wound stared off as a Basal cell which was excised by his dermatologist with clear margins and felt that the wound was not getting any better due to his varicose veins. Pt has no fevers or chills today he complains of pain in the wound. pt is currently wearing compression stocking.  He has been off prednisone\par Patient's leg discomfort is associated with  leg swelling, fatigue, heaviness, achiness. \par Patient's symptoms have persisted despite conservative management with leg elevation, exercise, attempted weight loss, over-the-counter medications.\par Patient's symptoms are aggravated by weight bearing, prolonged standing, prolonged sitting.\par \par 9/5 [facility] for right leg ablation 9/18\par VN 3x week\par Patient's symptoms are alleviated by  leg elevation, exercise. \par Patient's symptoms interfere with the patient's  ADLs such as work, shopping and housekeeping.  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 \par 6/19/19- per patient Plastics did not advise use of STSG- wound "too small"\par \par Has an Aide- re COPD, no VN\[facility] [place]\par \par

## 2019-09-04 NOTE — ASSESSMENT
[Written] : Written [Verbal] : Verbal [Patient] : Patient [Good - alert, interested, motivated] : Good - alert, interested, motivated [Verbalizes knowledge/Understanding] : Verbalizes knowledge/understanding [Dressing changes] : dressing changes [Foot Care] : foot care [Skin Care] : skin care [Pressure relief] : pressure relief [Nutrition] : nutrition [Signs and symptoms of infection] : sign and symptoms of infection [Stable] : stable [Home] : Home [Ambulatory] : Ambulatory [Not Applicable - Long Term Care/Home Health Agency] : Long Term Care/Home Health Agency: Not Applicable [FreeTextEntry1] : \par \par Wound is healed, cavilon applied\par Plan -continue compression therapy\par no clinical sign of infection\par patient pleased with the results and care rendered\par He looks forward to his next family gathering and vacation\par

## 2019-09-11 ENCOUNTER — APPOINTMENT (OUTPATIENT)
Dept: WOUND CARE | Facility: CLINIC | Age: 79
End: 2019-09-11

## 2019-09-17 ENCOUNTER — RX RENEWAL (OUTPATIENT)
Age: 79
End: 2019-09-17

## 2019-09-18 ENCOUNTER — APPOINTMENT (OUTPATIENT)
Dept: PULMONOLOGY | Facility: CLINIC | Age: 79
End: 2019-09-18
Payer: COMMERCIAL

## 2019-09-18 ENCOUNTER — APPOINTMENT (OUTPATIENT)
Dept: WOUND CARE | Facility: CLINIC | Age: 79
End: 2019-09-18

## 2019-09-18 VITALS
OXYGEN SATURATION: 96 % | RESPIRATION RATE: 16 BRPM | WEIGHT: 268 LBS | DIASTOLIC BLOOD PRESSURE: 70 MMHG | HEIGHT: 72 IN | SYSTOLIC BLOOD PRESSURE: 120 MMHG | BODY MASS INDEX: 36.3 KG/M2 | HEART RATE: 86 BPM

## 2019-09-18 PROCEDURE — 96401 CHEMO ANTI-NEOPL SQ/IM: CPT

## 2019-09-18 RX ORDER — OMALIZUMAB 150 MG/ML
150 INJECTION, SOLUTION SUBCUTANEOUS
Qty: 0 | Refills: 0 | Status: COMPLETED | OUTPATIENT
Start: 2019-09-18

## 2019-09-18 RX ORDER — OMALIZUMAB 75 MG/.5ML
75 INJECTION, SOLUTION SUBCUTANEOUS
Qty: 0 | Refills: 0 | Status: COMPLETED | OUTPATIENT
Start: 2019-09-18

## 2019-09-18 RX ADMIN — OMALIZUMAB 75 MG/0.5ML: 75 INJECTION, SOLUTION SUBCUTANEOUS at 00:00

## 2019-09-18 RX ADMIN — OMALIZUMAB 150 MG/ML: 150 INJECTION, SOLUTION SUBCUTANEOUS at 00:00

## 2019-09-25 ENCOUNTER — APPOINTMENT (OUTPATIENT)
Dept: WOUND CARE | Facility: CLINIC | Age: 79
End: 2019-09-25

## 2019-10-03 ENCOUNTER — APPOINTMENT (OUTPATIENT)
Dept: PULMONOLOGY | Facility: CLINIC | Age: 79
End: 2019-10-03
Payer: COMMERCIAL

## 2019-10-03 VITALS
HEART RATE: 79 BPM | HEIGHT: 72 IN | RESPIRATION RATE: 16 BRPM | SYSTOLIC BLOOD PRESSURE: 136 MMHG | WEIGHT: 268 LBS | DIASTOLIC BLOOD PRESSURE: 60 MMHG | BODY MASS INDEX: 36.3 KG/M2 | OXYGEN SATURATION: 96 %

## 2019-10-03 PROCEDURE — 96401 CHEMO ANTI-NEOPL SQ/IM: CPT

## 2019-10-03 RX ORDER — OMALIZUMAB 150 MG/ML
150 INJECTION, SOLUTION SUBCUTANEOUS
Qty: 0 | Refills: 0 | Status: COMPLETED | OUTPATIENT
Start: 2019-10-03

## 2019-10-03 RX ORDER — OMALIZUMAB 75 MG/.5ML
75 INJECTION, SOLUTION SUBCUTANEOUS
Qty: 0 | Refills: 0 | Status: COMPLETED | OUTPATIENT
Start: 2019-10-03

## 2019-10-03 RX ADMIN — OMALIZUMAB 0 MG/0.5ML: 75 INJECTION, SOLUTION SUBCUTANEOUS at 00:00

## 2019-10-03 RX ADMIN — OMALIZUMAB 0 MG/ML: 150 INJECTION, SOLUTION SUBCUTANEOUS at 00:00

## 2019-10-11 NOTE — HISTORY OF PRESENT ILLNESS
Hg 8.7 and stable. KRISTINA: home with family support  1) family to transport patient at d/c  2) patient will need PCP follow up   3) patient will need 2nd IM letter prior to d/c  4.   Anticipate d/c in 1-2 days if stable.    -continues to get q3-4 hour Dilaudid iv    [FreeTextEntry1] : Mr. CLAYTON SALAZAR is a 78 year who presents for evaluation of  right lower extremity leg pain and nonhealing right leg ulceration for over one year duration.  .s/p RFA to right LE.   patient states that he had recurrent ulcer after the wound had healed when he used Viagra and Cialis together.  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 Pt has no fevers or chills today he complains of pain in the wound. pt is currently wearing compression stocking.  He has been off prednisone\par Patient's leg discomfort is associated with  leg swelling, fatigue, heaviness, achiness. \par Patient's symptoms have persisted despite conservative management with leg elevation, exercise, attempted weight loss, over-the-counter medications.\par Patient's symptoms are aggravated by weight bearing, prolonged standing, prolonged sitting.\par \par 9/5 [facility] for right leg ablation 9/18\par VN 3x week\par Patient's symptoms are alleviated by  leg elevation, exercise. \par Patient's symptoms interfere with the patient's  ADLs such as work, shopping and housekeeping.  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 \par \par

## 2019-10-16 ENCOUNTER — MEDICATION RENEWAL (OUTPATIENT)
Age: 79
End: 2019-10-16

## 2019-10-16 ENCOUNTER — APPOINTMENT (OUTPATIENT)
Dept: PULMONOLOGY | Facility: CLINIC | Age: 79
End: 2019-10-16
Payer: COMMERCIAL

## 2019-10-16 VITALS
BODY MASS INDEX: 36.3 KG/M2 | WEIGHT: 268 LBS | SYSTOLIC BLOOD PRESSURE: 130 MMHG | RESPIRATION RATE: 16 BRPM | DIASTOLIC BLOOD PRESSURE: 66 MMHG | HEART RATE: 73 BPM | OXYGEN SATURATION: 96 % | HEIGHT: 72 IN

## 2019-10-16 PROCEDURE — 96401 CHEMO ANTI-NEOPL SQ/IM: CPT

## 2019-10-16 RX ORDER — OMALIZUMAB 75 MG/.5ML
75 INJECTION, SOLUTION SUBCUTANEOUS
Qty: 0 | Refills: 0 | Status: COMPLETED | OUTPATIENT
Start: 2019-10-16

## 2019-10-16 RX ORDER — OMALIZUMAB 150 MG/ML
150 INJECTION, SOLUTION SUBCUTANEOUS
Qty: 0 | Refills: 0 | Status: COMPLETED | OUTPATIENT
Start: 2019-10-16

## 2019-10-16 RX ORDER — PREDNISONE 10 MG/1
10 TABLET ORAL
Qty: 21 | Refills: 0 | Status: ACTIVE | COMMUNITY
Start: 2018-10-09 | End: 1900-01-01

## 2019-10-16 RX ADMIN — OMALIZUMAB 75 MG/0.5ML: 75 INJECTION, SOLUTION SUBCUTANEOUS at 00:00

## 2019-10-16 RX ADMIN — OMALIZUMAB 0 MG/ML: 150 INJECTION, SOLUTION SUBCUTANEOUS at 00:00

## 2019-10-30 ENCOUNTER — APPOINTMENT (OUTPATIENT)
Dept: PULMONOLOGY | Facility: CLINIC | Age: 79
End: 2019-10-30
Payer: COMMERCIAL

## 2019-10-30 VITALS
SYSTOLIC BLOOD PRESSURE: 140 MMHG | BODY MASS INDEX: 36.3 KG/M2 | HEIGHT: 72 IN | DIASTOLIC BLOOD PRESSURE: 60 MMHG | HEART RATE: 65 BPM | RESPIRATION RATE: 16 BRPM | WEIGHT: 268 LBS | OXYGEN SATURATION: 97 %

## 2019-10-30 PROCEDURE — 96401 CHEMO ANTI-NEOPL SQ/IM: CPT

## 2019-10-30 RX ORDER — OMALIZUMAB 150 MG/ML
150 INJECTION, SOLUTION SUBCUTANEOUS
Qty: 0 | Refills: 0 | Status: COMPLETED | OUTPATIENT
Start: 2019-10-30

## 2019-10-30 RX ORDER — OMALIZUMAB 75 MG/.5ML
75 INJECTION, SOLUTION SUBCUTANEOUS
Qty: 0 | Refills: 0 | Status: COMPLETED | OUTPATIENT
Start: 2019-10-30

## 2019-10-30 RX ADMIN — OMALIZUMAB 75 MG/0.5ML: 75 INJECTION, SOLUTION SUBCUTANEOUS at 00:00

## 2019-10-30 RX ADMIN — OMALIZUMAB 0 MG/ML: 150 INJECTION, SOLUTION SUBCUTANEOUS at 00:00

## 2019-11-06 ENCOUNTER — APPOINTMENT (OUTPATIENT)
Dept: WOUND CARE | Facility: CLINIC | Age: 79
End: 2019-11-06
Payer: COMMERCIAL

## 2019-11-06 DIAGNOSIS — I83.893 VARICOSE VEINS OF BILATERAL LOWER EXTREMITIES WITH OTHER COMPLICATIONS: ICD-10-CM

## 2019-11-06 DIAGNOSIS — M79.89 OTHER SPECIFIED SOFT TISSUE DISORDERS: ICD-10-CM

## 2019-11-06 PROCEDURE — 99213 OFFICE O/P EST LOW 20 MIN: CPT

## 2019-11-06 NOTE — HISTORY OF PRESENT ILLNESS
[FreeTextEntry1] : Mr. CLAYTON SALAZAR is a 78 year who presents for evaluation of wound to left lower extremity.  He states that he was bit by a mosquito and was treated with po antibiotics.\par \par s/p  right lower extremity leg pain and nonhealing right leg ulceration for over one year duration.  .s/p RFA to right LE.   patient states that he had recurrent ulcer after the wound had healed when he used Viagra and Cialis together.  He then developed a leg rash and swelling to the legs.\par Patient returned from [place] to visit his family\par \par  pt stated the wound stared off as a Basal cell which was excised by his dermatologist with clear margins and felt that the wound was not getting any better due to his varicose veins. Pt has no fevers or chills today he complains of pain in the wound. pt is currently wearing compression stocking.  He has been off prednisone\par Patient's leg discomfort is associated with  leg swelling, fatigue, heaviness, achiness. \par Patient's symptoms have persisted despite conservative management with leg elevation, exercise, attempted weight loss, over-the-counter medications.\par Patient's symptoms are aggravated by weight bearing, prolonged standing, prolonged sitting.\par \par 9/5 [facility] for right leg ablation 9/18\par VN 3x week\par Patient's symptoms are alleviated by  leg elevation, exercise. \par Patient's symptoms interfere with the patient's  ADLs such as work, shopping and housekeeping.  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 \par 6/19/19- per patient Plastics did not advise use of STSG- wound "too small"\par \par Has an Aide- re COPD, no VN\par [place]\par \par

## 2019-11-06 NOTE — PHYSICAL EXAM
[2+] : right 2+ [Ankle Swelling (On Exam)] : present [Ankle Swelling On The Right] : mild [] : of the right leg [Ankle Swelling On The Left] : moderate [Alert] : alert [Oriented to Person] : oriented to person [Oriented to Place] : oriented to place [Oriented to Time] : oriented to time [Calm] : calm [JVD] : no jugular venous distention  [Abdomen Tenderness] : ~T ~M No abdominal tenderness [de-identified] : overweight, ambulatory, well groomed [de-identified] :  ambulatory [FreeTextEntry1] : right lower calf healed [FreeTextEntry2] : 0 [FreeTextEntry3] : 0 [FreeTextEntry4] : 0 [de-identified] : compression stockings [FreeTextEntry7] : anterior leg [FreeTextEntry8] : 0 [FreeTextEntry9] : 0 [de-identified] : 0 [de-identified] : posterior leg healed [de-identified] : 0 [de-identified] : 0 [de-identified] : 0 [TWNoteComboBox1] : Right [TWNoteComboBox3] : FT [de-identified] : Other [TWNoteComboBox9] : Right [de-identified] : Multilayer other compression wrap [de-identified] : Left

## 2019-11-06 NOTE — ASSESSMENT
[Verbal] : Verbal [Written] : Written [Patient] : Patient [Good - alert, interested, motivated] : Good - alert, interested, motivated [Verbalizes knowledge/Understanding] : Verbalizes knowledge/understanding [Dressing changes] : dressing changes [Foot Care] : foot care [Skin Care] : skin care [Pressure relief] : pressure relief [Signs and symptoms of infection] : sign and symptoms of infection [Nutrition] : nutrition [Stable] : stable [Home] : Home [Ambulatory] : Ambulatory [Not Applicable - Long Term Care/Home Health Agency] : Long Term Care/Home Health Agency: Not Applicable [FreeTextEntry1] : \par \par Wound is healed, cavilon applied\par Plan -continue compression therapy\par no clinical sign of infection\par patient pleased with the results and care rendered\par \par

## 2019-11-13 ENCOUNTER — APPOINTMENT (OUTPATIENT)
Dept: PULMONOLOGY | Facility: CLINIC | Age: 79
End: 2019-11-13

## 2019-11-15 ENCOUNTER — RX RENEWAL (OUTPATIENT)
Age: 79
End: 2019-11-15

## 2019-11-27 ENCOUNTER — NON-APPOINTMENT (OUTPATIENT)
Age: 79
End: 2019-11-27

## 2019-11-27 ENCOUNTER — APPOINTMENT (OUTPATIENT)
Dept: PULMONOLOGY | Facility: CLINIC | Age: 79
End: 2019-11-27
Payer: COMMERCIAL

## 2019-11-27 VITALS
OXYGEN SATURATION: 95 % | WEIGHT: 269 LBS | RESPIRATION RATE: 16 BRPM | BODY MASS INDEX: 36.44 KG/M2 | SYSTOLIC BLOOD PRESSURE: 130 MMHG | HEIGHT: 72 IN | HEART RATE: 88 BPM | DIASTOLIC BLOOD PRESSURE: 65 MMHG

## 2019-11-27 DIAGNOSIS — R06.83 SNORING: ICD-10-CM

## 2019-11-27 PROCEDURE — 99214 OFFICE O/P EST MOD 30 MIN: CPT | Mod: 25

## 2019-11-27 PROCEDURE — 96401 CHEMO ANTI-NEOPL SQ/IM: CPT

## 2019-11-27 PROCEDURE — 95012 NITRIC OXIDE EXP GAS DETER: CPT

## 2019-11-27 PROCEDURE — 94010 BREATHING CAPACITY TEST: CPT

## 2019-11-27 RX ORDER — OMALIZUMAB 75 MG/.5ML
75 INJECTION, SOLUTION SUBCUTANEOUS
Qty: 0 | Refills: 0 | Status: COMPLETED | OUTPATIENT
Start: 2019-11-27

## 2019-11-27 RX ORDER — OMALIZUMAB 150 MG/ML
150 INJECTION, SOLUTION SUBCUTANEOUS
Qty: 0 | Refills: 0 | Status: COMPLETED | OUTPATIENT
Start: 2019-11-27

## 2019-11-27 RX ADMIN — OMALIZUMAB 0 MG/ML: 150 INJECTION, SOLUTION SUBCUTANEOUS at 00:00

## 2019-11-27 RX ADMIN — OMALIZUMAB 75 MG/0.5ML: 75 INJECTION, SOLUTION SUBCUTANEOUS at 00:00

## 2019-11-27 NOTE — REVIEW OF SYSTEMS
[Recent Wt Loss (___ Lbs)] : recent [unfilled] ~Ulb weight loss [Eye Irritation] : ~T irritation of the eyes [Postnasal Drip] : postnasal drip [Sputum] : sputum  [Dyspnea] : dyspnea [As Noted in HPI] : as noted in HPI [Snoring] : snoring [Negative] : Pulmonary Hypertension [Wheezing] : no wheezing

## 2019-11-27 NOTE — PROCEDURE
[FreeTextEntry1] : PFT - spi reveals mild-moderate restrictive / obstructive dysfunction; FEV1 is 1.72 which is 54% of predicted, normal flow volume loop \par \par FENO was 10; a normal value being less than 25\par Fractional exhaled nitric oxide (FENO) is regarded as a simple, noninvasive method for assessing eosinophilic airway inflammation. Produced by a variety of cells within the lung, nitric oxide (NO) concentrations are generally low in healthy individuals. However, high concentrations of NO appear to be involved in nonspecific host defense mechanisms and chronic inflammatory diseases such as asthma. The American Thoracic Society (ATS) therefore has recommended using FENO to aid in the diagnosis and monitoring of eosinophilic airway inflammation and asthma, and for identifying steroid responsive individuals whose chronic respiratory symptoms may be caused by airway inflammation.

## 2019-11-27 NOTE — ASSESSMENT
[FreeTextEntry1] : Mr. Jimenez is a 78 year old male with a history of asthma, allergies, GERD, OSAS, HTN, HLD, hypothyroidism, bladder/prostate issue; anxiety/depression, non-smoker, now coming back for pulmonary re-evaluation. He is presenting today s/p left knee infection.\par \par His shortness of breath is felt to be multifactorial due to:\par -severe persistent asthma, controlled \par -poor breathing mechanics\par -emphysema\par -overweight / out of shape\par -? CAD \par -? 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 Asthma may be triggered by allergens, lung infections, or irritants in the air. Asthma triggers are different for each person\par -Inhaler technique reviewed as well as oral hygiene techniques reviewed with patient. Avoidance of cold air, extremes of temperature, rescue inhaler should be used before exercise. Order of medication reviewed with patient. Recommended use of a cool mist humidifier in the bedroom. \par \par problem 2: poor breathing mechanics / poor balance\par -recommended to get balance therapy\par -Proper breathing techniques were reviewed with an emphasis of exhalation. Patient instructed to breath in for 1 second and out for four seconds. Patient was encouraged to not talk while walking. \par \par problem 3: abnormal chest CT/ r/o TBM (50%)\par -c/w mucus plugging\par -recommended to add acapella device to be done multiple times daily \par -follow up chest CT due June 2019\par \par problem 4: allergic rhinitis\par -recommended to try Navage nasal system \par -continue Astelin 0.15% 1 inhalation each nostril BID \par -continue Clarinex 5 mg QHS \par -continue Qnasl 1 sniff/nostril BID\par -Recommending Zaditor 1 eye drop BID\par -Environmental measures for allergies were encouraged including mattress and pillow cover, air purifier, and environmental controls.\par \par problem 5: r/o ABPA \par -Based on blood work (not present)\par \par problem 6: obesity \par -recommended to read 'Bright Spots and Landmines' by author Toi Gillette and 'Obesity Code' by Jason Reis \par -Weight loss, exercise, and diet control were discussed and are highly encouraged. Treatment options were given such as, aqua therapy, and contacting a nutritionist. Recommended to use the elliptical, stationary bike, less use of treadmill. Obesity is associated with worsening asthma, shortness of breath, and potential for cardiac disease, diabetes, and other underlying medical conditions.\par \par problem 7: OSAS (stable now)\par -continue CPAP (increased pressure necessary)- compliant\par -Sleep apnea is associated with adverse clinical consequences which an affect most organ systems. Cardiovascular disease risk includes arrhythmias, atrial fibrillation, hypertension, coronary artery disease, and stroke. Metabolic disorders include diabetes type 2, non-alcoholic fatty liver disease. Mood disorder especially depression; and cognitive decline especially in the elderly. Associations with chronic reflux/Mancera’s esophagus some but not all inclusive. \par -Reasons to assess this include arousal consistent with hypopnea; respiratory events most prominent in REM sleep or supine position; therefore sleep staging and body position are important for accurate diagnosis and estimation of AHI. \par \par Problem 8: elevated IgE level \par -Received Xolair today \par -Xolair is a recombinant DNA- derived humanized IgG1K monoclonal antibody that selectively binds to human immunoglobulin E (IgE). Xolair is produced by a Chinese hamster ovary cell suspension culture in nutrient medium containing the antibiotic gentamicin. Gentamicin is not detectable in the final product. Xolair is a sterile, white, preservative free, lyophilized powder contained in a single use vial that is reconstituted with sterile water for suspension. Side effects include: wheezing, tightness of the chest, trouble breathing, hives, skin rash, feeling anxious or light-headed, fainting, warmth or tingling under skin, or swelling of face, lips, or tongue \par \par problem 9: health maintenance \par -s/p influenza vaccination 2019\par -recommended strep pneumonia vaccines: Prevnar-13 vaccine, followed by Pneumo vaccine 23 one year following\par -recommended early intervention for URIs\par -recommended regular osteoporosis evaluations\par -recommended early dermatological evaluations\par -recommended after the age of 50 to the age of 70, colonoscopy every 5 years \par  \par F/U in April 2020\par He is encouraged to call with any changes, concerns, or questions.

## 2019-11-27 NOTE — ADDENDUM
[FreeTextEntry1] : All medical record entries made by nina Stratton were at Dr. Jason Zeng's direction and personally dictated by me on 11/27/2019. I have reviewed the chart and agree that the record accurately reflects my personal performance of the history, physical exam, assessment and plan. I have also personally directed, reviewed, and agree with the discharge instructions.

## 2019-11-27 NOTE — PHYSICAL EXAM
[General Appearance - Well Developed] : well developed [Normal Appearance] : normal appearance [Well Groomed] : well groomed [General Appearance - Well Nourished] : well nourished [No Deformities] : no deformities [General Appearance - In No Acute Distress] : no acute distress [Normal Conjunctiva] : the conjunctiva exhibited no abnormalities [Eyelids - No Xanthelasma] : the eyelids demonstrated no xanthelasmas [Normal Oropharynx] : normal oropharynx [III] : III [Neck Appearance] : the appearance of the neck was normal [Neck Cervical Mass (___cm)] : no neck mass was observed [Jugular Venous Distention Increased] : there was no jugular-venous distention [Thyroid Diffuse Enlargement] : the thyroid was not enlarged [Thyroid Nodule] : there were no palpable thyroid nodules [Heart Rate And Rhythm] : heart rate and rhythm were normal [Heart Sounds] : normal S1 and S2 [Murmurs] : no murmurs present [Respiration, Rhythm And Depth] : normal respiratory rhythm and effort [Exaggerated Use Of Accessory Muscles For Inspiration] : no accessory muscle use [Auscultation Breath Sounds / Voice Sounds] : lungs were clear to auscultation bilaterally [Abdomen Soft] : soft [Abdomen Tenderness] : non-tender [Abdomen Mass (___ Cm)] : no abdominal mass palpated [Abnormal Walk] : normal gait [Gait - Sufficient For Exercise Testing] : the gait was sufficient for exercise testing [Nail Clubbing] : no clubbing of the fingernails [Cyanosis, Localized] : no localized cyanosis [Petechial Hemorrhages (___cm)] : no petechial hemorrhages [Skin Color & Pigmentation] : normal skin color and pigmentation [] : no rash [No Venous Stasis] : no venous stasis [Skin Lesions] : no skin lesions [No Skin Ulcers] : no skin ulcer [No Xanthoma] : no  xanthoma was observed [Deep Tendon Reflexes (DTR)] : deep tendon reflexes were 2+ and symmetric [Sensation] : the sensory exam was normal to light touch and pinprick [No Focal Deficits] : no focal deficits [Oriented To Time, Place, And Person] : oriented to person, place, and time [Impaired Insight] : insight and judgment were intact [Affect] : the affect was normal [FreeTextEntry1] : I:E 1:3, clear [FreeTextEntry2] : left knee sutures following infection; 1-2+ lower extremity edema

## 2019-11-27 NOTE — HISTORY OF PRESENT ILLNESS
[FreeTextEntry1] : Mr. Jimenez is a 79 year old male presenting to the office for a follow up visit for abnormal chest CT, elevated IgE level, severe asthma, KHADRA, postnasal drip, poor balance, snoring and shortness of breath. His chief complaint is\par -He states that he has generally been feeling well\par -he was having some constipation while in the hospital for the infection on his knee\par -he occasionally has some difficulty swallowing his medications if he has to take a lot of pills\par -he has been breathing well, which he feels is a result of being on Prednisone 65 mg QD. he has since tapered to 20 mg QD. he is also on an antibiotic\par -he was given Advair while in the hospital, however he has since returned to using all of his medications as directed\par -his weight has been stable\par -he denies any headaches, nausea, vomiting, fever, chills, sweats, chest pain, chest pressure, diarrhea, dizziness, leg swelling, leg pain, itchy eyes, itchy ears, heartburn, reflux, or sour taste in the mouth.

## 2020-01-22 ENCOUNTER — RX RENEWAL (OUTPATIENT)
Age: 80
End: 2020-01-22

## 2020-03-09 ENCOUNTER — APPOINTMENT (OUTPATIENT)
Dept: PULMONOLOGY | Facility: CLINIC | Age: 80
End: 2020-03-09

## 2020-03-12 ENCOUNTER — APPOINTMENT (OUTPATIENT)
Dept: PULMONOLOGY | Facility: CLINIC | Age: 80
End: 2020-03-12
Payer: COMMERCIAL

## 2020-03-12 ENCOUNTER — APPOINTMENT (OUTPATIENT)
Dept: PULMONOLOGY | Facility: CLINIC | Age: 80
End: 2020-03-12
Payer: MEDICARE

## 2020-03-12 VITALS
HEART RATE: 86 BPM | BODY MASS INDEX: 37.94 KG/M2 | WEIGHT: 265 LBS | OXYGEN SATURATION: 98 % | RESPIRATION RATE: 17 BRPM | TEMPERATURE: 98 F | HEIGHT: 70 IN | DIASTOLIC BLOOD PRESSURE: 60 MMHG | SYSTOLIC BLOOD PRESSURE: 150 MMHG

## 2020-03-12 DIAGNOSIS — R26.89 OTHER ABNORMALITIES OF GAIT AND MOBILITY: ICD-10-CM

## 2020-03-12 PROCEDURE — ZZZZZ: CPT

## 2020-03-12 PROCEDURE — 94010 BREATHING CAPACITY TEST: CPT

## 2020-03-12 PROCEDURE — 95012 NITRIC OXIDE EXP GAS DETER: CPT

## 2020-03-12 PROCEDURE — 96401 CHEMO ANTI-NEOPL SQ/IM: CPT

## 2020-03-12 PROCEDURE — 94729 DIFFUSING CAPACITY: CPT

## 2020-03-12 PROCEDURE — 99214 OFFICE O/P EST MOD 30 MIN: CPT | Mod: 25

## 2020-03-12 RX ORDER — OMALIZUMAB 75 MG/.5ML
75 INJECTION, SOLUTION SUBCUTANEOUS
Qty: 0 | Refills: 0 | Status: COMPLETED | OUTPATIENT
Start: 2020-03-12

## 2020-03-12 RX ORDER — OMALIZUMAB 150 MG/ML
150 INJECTION, SOLUTION SUBCUTANEOUS
Qty: 0 | Refills: 0 | Status: COMPLETED | OUTPATIENT
Start: 2020-03-12

## 2020-03-12 RX ADMIN — OMALIZUMAB 0 MG/ML: 150 INJECTION, SOLUTION SUBCUTANEOUS at 00:00

## 2020-03-12 RX ADMIN — OMALIZUMAB 0 MG/0.5ML: 75 INJECTION, SOLUTION SUBCUTANEOUS at 00:00

## 2020-03-12 NOTE — ASSESSMENT
[FreeTextEntry1] : Mr. Jimenez is a 79 year old male with a history of asthma, allergies, GERD, OSAS, HTN, HLD, hypothyroidism, bladder/prostate issue; anxiety/depression, non-smoker, now coming back for pulmonary re-evaluation. He is presenting today having lost 20 pounds, feeling better overall.\par \par His shortness of breath is felt to be multifactorial due to:\par -severe persistent asthma, controlled \par -poor breathing mechanics\par -emphysema\par -overweight / out of shape\par -? CAD \par -? 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 Asthma may be triggered by allergens, lung infections, or irritants in the air. Asthma triggers are different for each person\par -Inhaler technique reviewed as well as oral hygiene techniques reviewed with patient. Avoidance of cold air, extremes of temperature, rescue inhaler should be used before exercise. Order of medication reviewed with patient. Recommended use of a cool mist humidifier in the bedroom. \par \par problem 2: poor breathing mechanics / poor balance\par -recommended to get balance therapy\par -Proper breathing techniques were reviewed with an emphasis of exhalation. Patient instructed to breath in for 1 second and out for four seconds. Patient was encouraged to not talk while walking. \par \par problem 3: abnormal chest CT/ r/o TBM (50%)\par -c/w mucus plugging\par -recommended to add acapella device to be done multiple times daily \par -follow up chest CT due June 2020\par \par problem 4: allergic rhinitis\par -recommended to try Navage nasal system \par -continue Astelin 0.15% 1 inhalation each nostril BID \par -continue Clarinex 5 mg QHS \par -continue Qnasl 1 sniff/nostril BID\par -Recommending Zaditor 1 eye drop BID\par -Environmental measures for allergies were encouraged including mattress and pillow cover, air purifier, and environmental controls.\par \par problem 5: r/o ABPA \par -Based on blood work (not present)\par \par problem 6: obesity \par -recommended to read 'Bright Spots and Landmines' by author Toi Gillette and 'Obesity Code' by Jason Reis \par -Weight loss, exercise, and diet control were discussed and are highly encouraged. Treatment options were given such as, aqua therapy, and contacting a nutritionist. Recommended to use the elliptical, stationary bike, less use of treadmill. Obesity is associated with worsening asthma, shortness of breath, and potential for cardiac disease, diabetes, and other underlying medical conditions.\par \par problem 7: OSAS (stable now)\par -continue CPAP (increased pressure necessary)- compliant\par -Sleep apnea is associated with adverse clinical consequences which an affect most organ systems. Cardiovascular disease risk includes arrhythmias, atrial fibrillation, hypertension, coronary artery disease, and stroke. Metabolic disorders include diabetes type 2, non-alcoholic fatty liver disease. Mood disorder especially depression; and cognitive decline especially in the elderly. Associations with chronic reflux/Mancera’s esophagus some but not all inclusive. \par -Reasons to assess this include arousal consistent with hypopnea; respiratory events most prominent in REM sleep or supine position; therefore sleep staging and body position are important for accurate diagnosis and estimation of AHI. \par \par Problem 8: elevated IgE level \par -Received Xolair today after 3 month hiatus\par -Xolair is a recombinant DNA- derived humanized IgG1K monoclonal antibody that selectively binds to human immunoglobulin E (IgE). Xolair is produced by a Chinese hamster ovary cell suspension culture in nutrient medium containing the antibiotic gentamicin. Gentamicin is not detectable in the final product. Xolair is a sterile, white, preservative free, lyophilized powder contained in a single use vial that is reconstituted with sterile water for suspension. Side effects include: wheezing, tightness of the chest, trouble breathing, hives, skin rash, feeling anxious or light-headed, fainting, warmth or tingling under skin, or swelling of face, lips, or tongue \par \par problem 9: health maintenance \par -s/p influenza vaccination 2019\par -recommended strep pneumonia vaccines: Prevnar-13 vaccine, followed by Pneumo vaccine 23 one year following (completed)\par -recommended early intervention for URIs\par -recommended regular osteoporosis evaluations\par -recommended early dermatological evaluations\par -recommended after the age of 50 to the age of 70, colonoscopy every 5 years \par  \par F/U in April 2020\par He is encouraged to call with any changes, concerns, or questions.

## 2020-03-12 NOTE — REVIEW OF SYSTEMS
[Negative] : Endocrine [Recent Wt Loss (___ Lbs)] : ~T recent [unfilled] lb weight loss [Epistaxis] : epistaxis [Nasal Congestion] : nasal congestion [Postnasal Drip] : postnasal drip [Sinus Problems] : sinus problems [SOB on Exertion] : sob on exertion [Chest Discomfort] : no chest discomfort [GERD] : no gerd [Diarrhea] : no diarrhea [Constipation] : no constipation [Dysphagia] : no dysphagia

## 2020-03-12 NOTE — HISTORY OF PRESENT ILLNESS
[FreeTextEntry1] : Mr. Jimenez is a 79 year old male presenting to the office for a follow up visit for abnormal chest CT, elevated IgE level, severe asthma, KHADRA, postnasal drip, poor balance, snoring and shortness of breath. His chief complaint is sinus congestion.\par -he notes he became ill on his drive up from [place], and had diarrhea. He was diagnosed with a UTI\par -he notes his SOB is improving\par -he states he is sleeping well with use of his CPAP machine\par -he reports he has developed sinus congestion, post nasal drip, and epistaxis since having returned to [place]\par -he denies taking any new medications, vitamins, or supplements. \par -he reports having lost 20 pounds and feels improved\par -he notes his leg infection has greatly improved\par -he denies any palpitations, visual issues, chest pain, chest pressure, diarrhea, constipation, dysphagia, dizziness, sour taste in the mouth, heartburn, reflux, myalgias or arthralgias.

## 2020-03-12 NOTE — PHYSICAL EXAM
[General Appearance - Well Developed] : well developed [Normal Appearance] : normal appearance [Well Groomed] : well groomed [General Appearance - Well Nourished] : well nourished [No Deformities] : no deformities [General Appearance - In No Acute Distress] : no acute distress [Normal Conjunctiva] : the conjunctiva exhibited no abnormalities [Eyelids - No Xanthelasma] : the eyelids demonstrated no xanthelasmas [Normal Oropharynx] : normal oropharynx [III] : III [Neck Appearance] : the appearance of the neck was normal [Neck Cervical Mass (___cm)] : no neck mass was observed [Jugular Venous Distention Increased] : there was no jugular-venous distention [Thyroid Diffuse Enlargement] : the thyroid was not enlarged [Thyroid Nodule] : there were no palpable thyroid nodules [Heart Rate And Rhythm] : heart rate and rhythm were normal [Heart Sounds] : normal S1 and S2 [Murmurs] : no murmurs present [Respiration, Rhythm And Depth] : normal respiratory rhythm and effort [Exaggerated Use Of Accessory Muscles For Inspiration] : no accessory muscle use [Auscultation Breath Sounds / Voice Sounds] : lungs were clear to auscultation bilaterally [Abdomen Soft] : soft [Abdomen Tenderness] : non-tender [Abdomen Mass (___ Cm)] : no abdominal mass palpated [Abnormal Walk] : normal gait [Gait - Sufficient For Exercise Testing] : the gait was sufficient for exercise testing [Nail Clubbing] : no clubbing of the fingernails [Cyanosis, Localized] : no localized cyanosis [Petechial Hemorrhages (___cm)] : no petechial hemorrhages [Skin Color & Pigmentation] : normal skin color and pigmentation [] : no rash [No Venous Stasis] : no venous stasis [Skin Lesions] : no skin lesions [No Skin Ulcers] : no skin ulcer [No Xanthoma] : no  xanthoma was observed [Deep Tendon Reflexes (DTR)] : deep tendon reflexes were 2+ and symmetric [Sensation] : the sensory exam was normal to light touch and pinprick [No Focal Deficits] : no focal deficits [Oriented To Time, Place, And Person] : oriented to person, place, and time [Impaired Insight] : insight and judgment were intact [Affect] : the affect was normal [FreeTextEntry1] : chronic venous stasis changes of the legs

## 2020-03-12 NOTE — PROCEDURE
[FreeTextEntry1] : Full PFT revealed moderate restrictive and severe obstructive dysfunction, with a FEV1 of 1.38L, which is 40% of predicted, and a normal diffusion of 29.5, which is 114% of predicted, with a normal flow volume loop\par \par FENO was 17; a normal value being less than 25\par Fractional exhaled nitric oxide (FENO) is regarded as a simple, noninvasive method for assessing eosinophilic airway inflammation. Produced by a variety of cells within the lung, nitric oxide (NO) concentrations are generally low in healthy individuals. However, high concentrations of NO appear to be involved in nonspecific host defense mechanisms and chronic inflammatory diseases such as asthma. The American Thoracic Society (ATS) therefore has recommended using FENO to aid in the diagnosis and monitoring of eosinophilic airway inflammation and asthma, and for identifying steroid responsive individuals whose chronic respiratory symptoms may be caused by airway inflammation.

## 2020-03-12 NOTE — ADDENDUM
[FreeTextEntry1] : Documented by Curtis Tee acting as a scribe for Dr. Jason Zeng on 03/12/2020.\par \par All medical record entries made by the Scribe were at my, Dr. Jason Zeng's, direction and personally dictated by me on 03/12/2020. I have reviewed the chart and agree that the record accurately reflects my personal performance of the history, physical exam, assessment and plan. I have also personally directed, reviewed, and agree with the discharge instructions.

## 2020-05-19 ENCOUNTER — RX RENEWAL (OUTPATIENT)
Age: 80
End: 2020-05-19

## 2020-06-12 ENCOUNTER — APPOINTMENT (OUTPATIENT)
Dept: PULMONOLOGY | Facility: CLINIC | Age: 80
End: 2020-06-12

## 2020-06-18 ENCOUNTER — APPOINTMENT (OUTPATIENT)
Dept: PULMONOLOGY | Facility: CLINIC | Age: 80
End: 2020-06-18
Payer: COMMERCIAL

## 2020-06-18 VITALS
SYSTOLIC BLOOD PRESSURE: 120 MMHG | DIASTOLIC BLOOD PRESSURE: 70 MMHG | OXYGEN SATURATION: 97 % | RESPIRATION RATE: 16 BRPM | BODY MASS INDEX: 40.09 KG/M2 | WEIGHT: 280 LBS | TEMPERATURE: 97.9 F | HEART RATE: 88 BPM | HEIGHT: 70 IN

## 2020-06-18 PROCEDURE — 99214 OFFICE O/P EST MOD 30 MIN: CPT | Mod: 25

## 2020-06-18 PROCEDURE — 96372 THER/PROPH/DIAG INJ SC/IM: CPT

## 2020-06-18 PROCEDURE — 94618 PULMONARY STRESS TESTING: CPT

## 2020-06-18 RX ORDER — BECLOMETHASONE DIPROPIONATE 80 UG/1
80 AEROSOL, METERED NASAL
Qty: 3 | Refills: 1 | Status: DISCONTINUED | COMMUNITY
Start: 2017-05-11 | End: 2020-06-18

## 2020-06-18 RX ORDER — SOFT LENS DISINFECTANT
SOLUTION, NON-ORAL MISCELLANEOUS
Qty: 1 | Refills: 0 | Status: DISCONTINUED | OUTPATIENT
Start: 2017-03-28 | End: 2020-06-18

## 2020-06-18 RX ORDER — DESLORATADINE 5 MG/1
5 TABLET ORAL
Qty: 90 | Refills: 4 | Status: DISCONTINUED | COMMUNITY
Start: 2018-12-05 | End: 2020-06-18

## 2020-06-18 RX ORDER — FLUTICASONE PROPIONATE AND SALMETEROL 50; 500 UG/1; UG/1
500-50 POWDER RESPIRATORY (INHALATION) TWICE DAILY
Qty: 3 | Refills: 1 | Status: DISCONTINUED | COMMUNITY
Start: 2017-03-06 | End: 2020-06-18

## 2020-06-18 RX ORDER — ROFLUMILAST 500 UG/1
500 TABLET ORAL
Qty: 90 | Refills: 3 | Status: DISCONTINUED | COMMUNITY
Start: 2019-05-01 | End: 2020-06-18

## 2020-06-18 RX ORDER — OMALIZUMAB 75 MG/.5ML
75 INJECTION, SOLUTION SUBCUTANEOUS
Qty: 0 | Refills: 0 | Status: COMPLETED | OUTPATIENT
Start: 2020-06-18

## 2020-06-18 RX ORDER — DESLORATADINE 5 MG/1
5 TABLET, FILM COATED ORAL
Qty: 1 | Refills: 1 | Status: DISCONTINUED | COMMUNITY
Start: 2018-06-20 | End: 2020-06-18

## 2020-06-18 RX ORDER — FLUTICASONE PROPIONATE AND SALMETEROL 50; 500 UG/1; UG/1
500-50 POWDER RESPIRATORY (INHALATION)
Qty: 180 | Refills: 1 | Status: DISCONTINUED | COMMUNITY
Start: 2017-06-08 | End: 2020-06-18

## 2020-06-18 RX ORDER — IPRATROPIUM BROMIDE AND ALBUTEROL SULFATE 2.5; .5 MG/3ML; MG/3ML
0.5-2.5 (3) SOLUTION RESPIRATORY (INHALATION) 4 TIMES DAILY
Qty: 120 | Refills: 3 | Status: DISCONTINUED | COMMUNITY
Start: 2018-06-13 | End: 2020-06-18

## 2020-06-18 RX ORDER — IPRATROPIUM BROMIDE AND ALBUTEROL SULFATE 2.5; .5 MG/3ML; MG/3ML
0.5-2.5 (3) SOLUTION RESPIRATORY (INHALATION) 4 TIMES DAILY
Qty: 360 | Refills: 1 | Status: DISCONTINUED | COMMUNITY
Start: 2018-06-20 | End: 2020-06-18

## 2020-06-18 RX ORDER — AZELASTINE HYDROCHLORIDE 205.5 UG/1
0.15 SPRAY, METERED NASAL TWICE DAILY
Qty: 3 | Refills: 1 | Status: DISCONTINUED | COMMUNITY
Start: 2018-06-20 | End: 2020-06-18

## 2020-06-18 RX ORDER — ZAFIRLUKAST 20 MG/1
20 TABLET, FILM COATED ORAL
Qty: 180 | Refills: 1 | Status: DISCONTINUED | COMMUNITY
Start: 2018-02-08 | End: 2020-06-18

## 2020-06-18 RX ORDER — AMOXICILLIN AND CLAVULANATE POTASSIUM 875; 125 MG/1; MG/1
875-125 TABLET, COATED ORAL
Qty: 28 | Refills: 0 | Status: DISCONTINUED | COMMUNITY
Start: 2019-10-16 | End: 2020-06-18

## 2020-06-18 RX ORDER — IPRATROPIUM BROMIDE AND ALBUTEROL SULFATE 2.5; .5 MG/3ML; MG/3ML
0.5-2.5 (3) SOLUTION RESPIRATORY (INHALATION) 4 TIMES DAILY
Qty: 360 | Refills: 0 | Status: DISCONTINUED | COMMUNITY
Start: 2017-03-28 | End: 2020-06-18

## 2020-06-18 RX ORDER — DESLORATADINE 5 MG/1
5 TABLET, FILM COATED ORAL
Qty: 1 | Refills: 1 | Status: DISCONTINUED | COMMUNITY
Start: 2018-04-25 | End: 2020-06-18

## 2020-06-18 RX ORDER — OMALIZUMAB 202.5 MG/1.4ML
150 INJECTION, SOLUTION SUBCUTANEOUS
Qty: 1 | Refills: 0 | Status: COMPLETED | OUTPATIENT
Start: 2020-06-18

## 2020-06-18 NOTE — PROCEDURE
[FreeTextEntry1] : Xolair 225 mg Sub-Q given in 2 divided doses of 75 mg (RUE) and 150 mg (LUE) injections. \par \par 6MWT: \par SPO2 @ rest on RA: 92%\par SPO2 on exertion on RA: 91-94%\par SPO2 on recovery RA: 94%

## 2020-06-18 NOTE — REVIEW OF SYSTEMS
[Fatigue] : fatigue [Postnasal Drip] : postnasal drip [Sinus Problems] : sinus problems [Cough] : cough [Sputum] : sputum [SOB on Exertion] : sob on exertion [Nasal Discharge] : nasal discharge [Arthralgias] : arthralgias [Obesity] : obesity [Negative] : Neurologic [Chills] : no chills [Fever] : no fever [Chest Tightness] : no chest tightness [Poor Appetite] : no poor appetite [Nasal Congestion] : no nasal congestion [Dyspnea] : no dyspnea [Wheezing] : no wheezing [Palpitations] : no palpitations [Chest Discomfort] : no chest discomfort [Nausea] : no nausea [Vomiting] : no vomiting [GERD] : no gerd [Headache] : no headache [Diarrhea] : no diarrhea [Dizziness] : no dizziness [TextBox_91] : Bilateral knee pain.

## 2020-06-18 NOTE — PHYSICAL EXAM
[No Acute Distress] : no acute distress [Normal Oropharynx] : normal oropharynx [III] : Mallampati Class: III [Normal Appearance] : normal appearance [No Neck Mass] : no neck mass [Normal Rate/Rhythm] : normal rate/rhythm [No Murmurs] : no murmurs [Normal S1, S2] : normal s1, s2 [No Resp Distress] : no resp distress [No Abnormalities] : no abnormalities [Clear to Auscultation Bilaterally] : clear to auscultation bilaterally [Normal Gait] : normal gait [No Clubbing] : no clubbing [No Edema] : no edema [No Cyanosis] : no cyanosis [FROM] : FROM [Normal Color/ Pigmentation] : normal color/ pigmentation [No Focal Deficits] : no focal deficits [Oriented x3] : oriented x3 [Normal Affect] : normal affect

## 2020-06-18 NOTE — HISTORY OF PRESENT ILLNESS
[TextBox_4] : Mr. Jimenez is a 79 year old male presenting to the office for a follow up visit for abnormal chest CT, elevated IgE level, severe asthma, KHADRA, postnasal drip, poor balance, snoring and shortness of breath. \par \par His chief complaint is increased SOB on exertion x 4 months. \par \par Patient notes that he noticed his SOB worsening upon arriving back to [place] from [place].  He states in [place] he would garden and perform activities without much issue.  He notes that he has gained weight d/t being in quarantine.  He states that recently he has been gardening and cleaning and feels as though he can exert himself for about 30-40 minutes and then he starts to become "winded".  When patient reaches this point of feeling "winded" he notes he would also experience dizziness simultaneously.  Patient notes he would sit down and take a break from whatever activity he was doing and symptoms would resolve shortly thereafter.  Patient notes he has also felt increase dyspnea upon walking up 2 flights of stairs in his home.  \par \par Patient also c/o daily coughing and congestion.  He is unsure if congestion is dripping down from his sinuses or coming up from his chest.  He states every morning when he awakens, he coughs to bring up mucus.  He notes that coughing could also happen through out the day as well.  He does feel the need to clear his throat constantly.  He notes he has been having episodes of sneezing and constant runny nose. He states mucus he coughs up and nasal mucus are always clear.  \par \par Patient denies fever/chills, decreased appetite, wheezing, chest tightness, chest discomfort, SOB @ rest, reflux or N/V/D.

## 2020-06-26 ENCOUNTER — RESULT REVIEW (OUTPATIENT)
Age: 80
End: 2020-06-26

## 2020-06-26 ENCOUNTER — APPOINTMENT (OUTPATIENT)
Dept: CT IMAGING | Facility: IMAGING CENTER | Age: 80
End: 2020-06-26
Payer: COMMERCIAL

## 2020-06-26 ENCOUNTER — OUTPATIENT (OUTPATIENT)
Dept: OUTPATIENT SERVICES | Facility: HOSPITAL | Age: 80
LOS: 1 days | End: 2020-06-26
Payer: COMMERCIAL

## 2020-06-26 DIAGNOSIS — R93.89 ABNORMAL FINDINGS ON DIAGNOSTIC IMAGING OF OTHER SPECIFIED BODY STRUCTURES: ICD-10-CM

## 2020-06-26 DIAGNOSIS — R91.8 OTHER NONSPECIFIC ABNORMAL FINDING OF LUNG FIELD: ICD-10-CM

## 2020-06-26 PROCEDURE — 71250 CT THORAX DX C-: CPT

## 2020-06-26 PROCEDURE — 71250 CT THORAX DX C-: CPT | Mod: 26

## 2020-07-23 ENCOUNTER — APPOINTMENT (OUTPATIENT)
Dept: PULMONOLOGY | Facility: CLINIC | Age: 80
End: 2020-07-23
Payer: COMMERCIAL

## 2020-07-23 VITALS
SYSTOLIC BLOOD PRESSURE: 140 MMHG | TEMPERATURE: 97.4 F | DIASTOLIC BLOOD PRESSURE: 80 MMHG | HEART RATE: 79 BPM | OXYGEN SATURATION: 94 % | BODY MASS INDEX: 40.8 KG/M2 | HEIGHT: 70 IN | RESPIRATION RATE: 16 BRPM | WEIGHT: 285 LBS

## 2020-07-23 PROCEDURE — 99214 OFFICE O/P EST MOD 30 MIN: CPT | Mod: 25

## 2020-07-23 PROCEDURE — 96372 THER/PROPH/DIAG INJ SC/IM: CPT

## 2020-07-23 RX ORDER — OMALIZUMAB 150 MG/ML
150 INJECTION, SOLUTION SUBCUTANEOUS
Qty: 0 | Refills: 0 | Status: COMPLETED | OUTPATIENT
Start: 2020-07-23

## 2020-07-23 RX ORDER — OMALIZUMAB 75 MG/.5ML
75 INJECTION, SOLUTION SUBCUTANEOUS
Qty: 0 | Refills: 0 | Status: COMPLETED | OUTPATIENT
Start: 2020-07-23

## 2020-07-23 RX ADMIN — OMALIZUMAB 0 MG/ML: 150 INJECTION, SOLUTION SUBCUTANEOUS at 00:00

## 2020-07-23 RX ADMIN — OMALIZUMAB 0 MG/0.5ML: 75 INJECTION, SOLUTION SUBCUTANEOUS at 00:00

## 2020-07-23 NOTE — ASSESSMENT
[FreeTextEntry1] : Mr. Jimenez is an 80 year old male with a history of asthma, allergies, chronic mucopurulent bronchitis, GERD, OSAS, HTN, HLD, hypothyroidism, bladder/prostate issue; anxiety/depression, non-smoker, now coming back for pulmonary re-evaluation. He is presenting today with a chronic cough.\par \par His shortness of breath is felt to be multifactorial due to:\par -severe persistent asthma, controlled \par -poor breathing mechanics\par -emphysema\par -overweight / out of shape\par -? CAD \par -? 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 Asthma may be triggered by allergens, lung infections, or irritants in the air. Asthma triggers are different for each person\par -Inhaler technique reviewed as well as oral hygiene techniques reviewed with patient. Avoidance of cold air, extremes of temperature, rescue inhaler should be used before exercise. Order of medication reviewed with patient. Recommended use of a cool mist humidifier in the bedroom. \par \par problem 2: poor breathing mechanics / poor balance\par -recommended to get balance therapy\par -Proper breathing techniques were reviewed with an emphasis of exhalation. Patient instructed to breath in for 1 second and out for four seconds. Patient was encouraged to not talk while walking. \par \par problem 3: abnormal chest CT/ r/o TBM (50%)\par -c/w mucus plugging\par -recommended to add acapella device to be done multiple times daily \par -follow up chest CT due June 2021\par \par Problem 3A: Chronic mucopurulent bronchitis\par -set up chest vest therapy\par -patient tried and failed acapella device therapy\par Patient has a chronic cough greater than 6 months, tried and failed manual chest physiotherapy at home, no skilled caregiver available at home to perform manual CPT, tried and failed acapella vibratory physiotherapy, and recommended chest vest therapy \par \par problem 4: allergic rhinitis\par -recommended to try Navage nasal system \par -continue Astelin 0.15% 1 inhalation each nostril BID \par -continue Clarinex 5 mg QHS \par -continue Qnasl 1 sniff/nostril BID\par -Recommending Zaditor 1 eye drop BID\par -Environmental measures for allergies were encouraged including mattress and pillow cover, air purifier, and environmental controls.\par \par problem 5: r/o ABPA \par -Based on blood work (not present)\par \par problem 6: obesity \par -recommended to read 'Bright Spots and Landmines' by author Toi Gillette and 'Obesity Code' by Jason Reis \par -Weight loss, exercise, and diet control were discussed and are highly encouraged. Treatment options were given such as, aqua therapy, and contacting a nutritionist. Recommended to use the elliptical, stationary bike, less use of treadmill. Obesity is associated with worsening asthma, shortness of breath, and potential for cardiac disease, diabetes, and other underlying medical conditions.\par \par problem 7: OSAS (stable now)\par -continue CPAP (increased pressure necessary)- compliant\par -Sleep apnea is associated with adverse clinical consequences which an affect most organ systems. Cardiovascular disease risk includes arrhythmias, atrial fibrillation, hypertension, coronary artery disease, and stroke. Metabolic disorders include diabetes type 2, non-alcoholic fatty liver disease. Mood disorder especially depression; and cognitive decline especially in the elderly. Associations with chronic reflux/Mancera’s esophagus some but not all inclusive. \par -Reasons to assess this include arousal consistent with hypopnea; respiratory events most prominent in REM sleep or supine position; therefore sleep staging and body position are important for accurate diagnosis and estimation of AHI. \par \par Problem 8: elevated IgE level \par -Received Xolair today after 3 month hiatus\par -Xolair is a recombinant DNA- derived humanized IgG1K monoclonal antibody that selectively binds to human immunoglobulin E (IgE). Xolair is produced by a Chinese hamster ovary cell suspension culture in nutrient medium containing the antibiotic gentamicin. Gentamicin is not detectable in the final product. Xolair is a sterile, white, preservative free, lyophilized powder contained in a single use vial that is reconstituted with sterile water for suspension. Side effects include: wheezing, tightness of the chest, trouble breathing, hives, skin rash, feeling anxious or light-headed, fainting, warmth or tingling under skin, or swelling of face, lips, or tongue \par \par problem 9: Health Maintenance/COVID19 Precautions:\par - Clean your hands often. Wash your hands often with soap and water for at least 20 seconds, especially after blowing your nose, coughing, or sneezing, or having been in a public place.\par - If soap and water are not available, use a hand  that contains at least 60% alcohol.\par - To the extent possible, avoid touching high-touch surfaces in public places - elevator buttons, door handles, handrails, handshaking with people, etc. Use a tissue or your sleeve to cover your hand or finger if you must touch something.\par - Wash your hands after touching surfaces in public places.\par - Avoid touching your face, nose, eyes, etc.\par - Clean and disinfect your home to remove germs: practice routine cleaning of frequently touched surfaces (for example: tables, doorknobs, light switches, handles, desks, toilets, faucets, sinks & cell phones)\par - Avoid crowds, especially in poorly ventilated spaces. Your risk of exposure to respiratory viruses like COVID-19 may increase in crowded, closed-in settings with little air circulation if there are people in the crowd who are sick. All patients are recommended to practice social distancing and stay at least 6 feet away from others.\par - Avoid all non-essential travel including plane trips, and especially avoid embarking on cruise ships.\par -If COVID-19 is spreading in your community, take extra measures to put distance between yourself and other people to further reduce your risk of being exposed to this new virus.\par -Stay home as much as possible.\par - Consider ways of getting food brought to your house through family, social, or commercial networks\par -Be aware that the virus has been known to live in the air up to 3 hours post exposure, cardboard up to 24 hours post exposure, copper up to 4 hours post exposure, steel and plastic up to 2-3 days post exposure. Risk of transmission from these surfaces are affected by many variables.\par Immune Support Recommendations:\par -OTC Vitamin C 500mg BID \par -OTC Quercetin 250-500mg BID \par -OTC Zinc 75-100mg per day \par -OTC Melatonin 1 or 2 mg a night \par -OTC Vitamin D 1-4000mg per day \par -OTC Tonic Water 8oz per day\par Asthma and COVID19:\par You need to make sure your asthma is under control. This often requires the use of inhaled corticosteroids (and sometimes oral corticosteroids). Inhaled corticosteroids do not likely reduce your immune system’s ability to fight infections, but oral corticosteroids may. It is important to use the steps above to protect yourself to limit your exposure to any respiratory virus. \par \par Problem 10: health maintenance \par -s/p influenza vaccination 2019\par -recommended strep pneumonia vaccines: Prevnar-13 vaccine, followed by Pneumo vaccine 23 one year following (completed)\par -recommended early intervention for URIs\par -recommended regular osteoporosis evaluations\par -recommended early dermatological evaluations\par -recommended after the age of 50 to the age of 70, colonoscopy every 5 years \par  \par F/U in April 2020\par He is encouraged to call with any changes, concerns, or questions.

## 2020-07-23 NOTE — REVIEW OF SYSTEMS
[Negative] : Endocrine [Recent Wt Gain (___ Lbs)] : ~T recent [unfilled] lb weight gain [SOB on Exertion] : sob on exertion [Cough] : cough [Sputum] : sputum [Chest Discomfort] : no chest discomfort [Diarrhea] : no diarrhea [GERD] : no gerd [Constipation] : no constipation [Dysphagia] : no dysphagia [Dizziness] : no dizziness

## 2020-07-23 NOTE — ADDENDUM
[FreeTextEntry1] : Documented by Curtis Tee acting as a scribe for Dr. Jason Zeng on 07/23/2020.\par \par All medical record entries made by the Scribe were at my, Dr. Jason Zeng's, direction and personally dictated by me on 07/23/2020. I have reviewed the chart and agree that the record accurately reflects my personal performance of the history, physical exam, assessment and plan. I have also personally directed, reviewed, and agree with the discharge instructions.

## 2020-07-23 NOTE — HISTORY OF PRESENT ILLNESS
[FreeTextEntry1] : Mr. Jimenez is a 80 year old male presenting to the office for a follow up visit for abnormal chest CT, elevated IgE level, severe asthma, KHADRA, postnasal drip, poor balance, snoring and shortness of breath. His chief complaint is \par -he note he has gained about 20 pounds due to lack of exercise, as he can't access a poll due to Covid\par -he notes he has SOB when walking up stairs\par -he reports he brings up clear sputum easily with a cough when he gets out of his chair\par -He notes His bowels are regular \par -he notes he is having mucus production in his eyes\par -he states he isnt walking as much as he would like to\par -he notes he uses his nasal sprays regularly\par -he denies any chest pain, chest pressure, diarrhea, constipation, dysphagia, dizziness, sour taste in the mouth, leg swelling, leg pain, itchy eyes, itchy ears, heartburn, reflux, myalgias or arthralgias.

## 2020-07-23 NOTE — PHYSICAL EXAM
[General Appearance - Well Developed] : well developed [Normal Appearance] : normal appearance [No Deformities] : no deformities [General Appearance - Well Nourished] : well nourished [Well Groomed] : well groomed [Normal Conjunctiva] : the conjunctiva exhibited no abnormalities [General Appearance - In No Acute Distress] : no acute distress [III] : III [Normal Oropharynx] : normal oropharynx [Eyelids - No Xanthelasma] : the eyelids demonstrated no xanthelasmas [Neck Appearance] : the appearance of the neck was normal [Jugular Venous Distention Increased] : there was no jugular-venous distention [Neck Cervical Mass (___cm)] : no neck mass was observed [Thyroid Diffuse Enlargement] : the thyroid was not enlarged [Heart Rate And Rhythm] : heart rate and rhythm were normal [Thyroid Nodule] : there were no palpable thyroid nodules [Heart Sounds] : normal S1 and S2 [Murmurs] : no murmurs present [Exaggerated Use Of Accessory Muscles For Inspiration] : no accessory muscle use [Respiration, Rhythm And Depth] : normal respiratory rhythm and effort [Auscultation Breath Sounds / Voice Sounds] : lungs were clear to auscultation bilaterally [Abdomen Tenderness] : non-tender [Abdomen Soft] : soft [Abnormal Walk] : normal gait [Abdomen Mass (___ Cm)] : no abdominal mass palpated [Gait - Sufficient For Exercise Testing] : the gait was sufficient for exercise testing [Nail Clubbing] : no clubbing of the fingernails [Petechial Hemorrhages (___cm)] : no petechial hemorrhages [Cyanosis, Localized] : no localized cyanosis [Skin Color & Pigmentation] : normal skin color and pigmentation [] : no rash [Skin Lesions] : no skin lesions [No Venous Stasis] : no venous stasis [No Xanthoma] : no  xanthoma was observed [No Skin Ulcers] : no skin ulcer [Deep Tendon Reflexes (DTR)] : deep tendon reflexes were 2+ and symmetric [No Focal Deficits] : no focal deficits [Sensation] : the sensory exam was normal to light touch and pinprick [Oriented To Time, Place, And Person] : oriented to person, place, and time [Impaired Insight] : insight and judgment were intact [Affect] : the affect was normal [FreeTextEntry1] : I:E 1:3, clear [FreeTextEntry2] : 1+ LE edema

## 2020-07-23 NOTE — PROCEDURE
[FreeTextEntry1] : Chest CT (6.26.2020) reveals scattered foci of impacted airways are unchanged from 2017.

## 2020-08-13 ENCOUNTER — APPOINTMENT (OUTPATIENT)
Dept: PULMONOLOGY | Facility: CLINIC | Age: 80
End: 2020-08-13
Payer: COMMERCIAL

## 2020-08-13 VITALS
OXYGEN SATURATION: 95 % | DIASTOLIC BLOOD PRESSURE: 70 MMHG | BODY MASS INDEX: 40.94 KG/M2 | HEART RATE: 79 BPM | SYSTOLIC BLOOD PRESSURE: 120 MMHG | RESPIRATION RATE: 16 BRPM | HEIGHT: 70 IN | WEIGHT: 286 LBS | TEMPERATURE: 97 F

## 2020-08-13 PROCEDURE — 96372 THER/PROPH/DIAG INJ SC/IM: CPT

## 2020-08-13 RX ORDER — OMALIZUMAB 150 MG/ML
150 INJECTION, SOLUTION SUBCUTANEOUS
Qty: 0 | Refills: 0 | Status: COMPLETED | OUTPATIENT
Start: 2020-08-13

## 2020-08-13 RX ORDER — OMALIZUMAB 75 MG/.5ML
75 INJECTION, SOLUTION SUBCUTANEOUS
Qty: 0 | Refills: 0 | Status: COMPLETED | OUTPATIENT
Start: 2020-08-13

## 2020-08-27 ENCOUNTER — APPOINTMENT (OUTPATIENT)
Dept: PULMONOLOGY | Facility: CLINIC | Age: 80
End: 2020-08-27
Payer: COMMERCIAL

## 2020-08-27 VITALS
TEMPERATURE: 97.6 F | HEIGHT: 70 IN | WEIGHT: 287 LBS | OXYGEN SATURATION: 95 % | BODY MASS INDEX: 41.09 KG/M2 | HEART RATE: 81 BPM | SYSTOLIC BLOOD PRESSURE: 120 MMHG | RESPIRATION RATE: 16 BRPM | DIASTOLIC BLOOD PRESSURE: 80 MMHG

## 2020-08-27 PROCEDURE — 96372 THER/PROPH/DIAG INJ SC/IM: CPT

## 2020-08-27 RX ORDER — OMALIZUMAB 150 MG/ML
150 INJECTION, SOLUTION SUBCUTANEOUS
Qty: 0 | Refills: 0 | Status: COMPLETED | OUTPATIENT
Start: 2020-08-27

## 2020-08-27 RX ORDER — OMALIZUMAB 75 MG/.5ML
75 INJECTION, SOLUTION SUBCUTANEOUS
Qty: 0 | Refills: 0 | Status: COMPLETED | OUTPATIENT
Start: 2020-08-27

## 2020-09-10 ENCOUNTER — APPOINTMENT (OUTPATIENT)
Dept: PULMONOLOGY | Facility: CLINIC | Age: 80
End: 2020-09-10

## 2020-09-16 ENCOUNTER — APPOINTMENT (OUTPATIENT)
Dept: PULMONOLOGY | Facility: CLINIC | Age: 80
End: 2020-09-16
Payer: COMMERCIAL

## 2020-09-16 VITALS
DIASTOLIC BLOOD PRESSURE: 72 MMHG | BODY MASS INDEX: 41.09 KG/M2 | HEIGHT: 70 IN | HEART RATE: 84 BPM | WEIGHT: 287 LBS | TEMPERATURE: 97.9 F | SYSTOLIC BLOOD PRESSURE: 160 MMHG | OXYGEN SATURATION: 96 % | RESPIRATION RATE: 17 BRPM

## 2020-09-16 PROCEDURE — 96372 THER/PROPH/DIAG INJ SC/IM: CPT

## 2020-09-16 RX ORDER — OMALIZUMAB 75 MG/.5ML
75 INJECTION, SOLUTION SUBCUTANEOUS
Qty: 0 | Refills: 0 | Status: COMPLETED | OUTPATIENT
Start: 2020-09-16

## 2020-09-16 RX ORDER — OMALIZUMAB 150 MG/ML
150 INJECTION, SOLUTION SUBCUTANEOUS
Qty: 0 | Refills: 0 | Status: COMPLETED | OUTPATIENT
Start: 2020-09-16

## 2020-09-16 RX ADMIN — OMALIZUMAB 0 MG/0.5ML: 75 INJECTION, SOLUTION SUBCUTANEOUS at 00:00

## 2020-09-16 RX ADMIN — OMALIZUMAB 0 MG/ML: 150 INJECTION, SOLUTION SUBCUTANEOUS at 00:00

## 2020-09-29 ENCOUNTER — APPOINTMENT (OUTPATIENT)
Dept: PULMONOLOGY | Facility: CLINIC | Age: 80
End: 2020-09-29
Payer: COMMERCIAL

## 2020-09-29 VITALS
TEMPERATURE: 98 F | OXYGEN SATURATION: 96 % | SYSTOLIC BLOOD PRESSURE: 130 MMHG | RESPIRATION RATE: 14 BRPM | HEIGHT: 70 IN | HEART RATE: 76 BPM | BODY MASS INDEX: 41.23 KG/M2 | WEIGHT: 288 LBS | DIASTOLIC BLOOD PRESSURE: 72 MMHG

## 2020-09-29 PROCEDURE — 96372 THER/PROPH/DIAG INJ SC/IM: CPT

## 2020-09-29 RX ORDER — OMALIZUMAB 150 MG/ML
150 INJECTION, SOLUTION SUBCUTANEOUS
Qty: 0 | Refills: 0 | Status: COMPLETED | OUTPATIENT
Start: 2020-09-29

## 2020-09-29 RX ORDER — OMALIZUMAB 75 MG/.5ML
75 INJECTION, SOLUTION SUBCUTANEOUS
Qty: 0 | Refills: 0 | Status: COMPLETED | OUTPATIENT
Start: 2020-09-29

## 2020-10-01 DIAGNOSIS — Z01.812 ENCOUNTER FOR PREPROCEDURAL LABORATORY EXAMINATION: ICD-10-CM

## 2020-10-23 ENCOUNTER — APPOINTMENT (OUTPATIENT)
Dept: PULMONOLOGY | Facility: CLINIC | Age: 80
End: 2020-10-23

## 2020-10-30 ENCOUNTER — APPOINTMENT (OUTPATIENT)
Dept: PULMONOLOGY | Facility: CLINIC | Age: 80
End: 2020-10-30

## 2020-11-20 ENCOUNTER — APPOINTMENT (OUTPATIENT)
Dept: PULMONOLOGY | Facility: CLINIC | Age: 80
End: 2020-11-20
Payer: COMMERCIAL

## 2020-11-20 VITALS
TEMPERATURE: 97.9 F | HEIGHT: 70 IN | HEART RATE: 76 BPM | RESPIRATION RATE: 16 BRPM | WEIGHT: 295 LBS | BODY MASS INDEX: 42.23 KG/M2 | SYSTOLIC BLOOD PRESSURE: 140 MMHG | OXYGEN SATURATION: 96 % | DIASTOLIC BLOOD PRESSURE: 70 MMHG

## 2020-11-20 PROCEDURE — 96372 THER/PROPH/DIAG INJ SC/IM: CPT

## 2020-11-20 RX ORDER — ROFLUMILAST 500 UG/1
500 TABLET ORAL DAILY
Qty: 1 | Refills: 1 | Status: ACTIVE | COMMUNITY
Start: 2020-11-20 | End: 1900-01-01

## 2020-11-20 RX ORDER — OMALIZUMAB 75 MG/.5ML
75 INJECTION, SOLUTION SUBCUTANEOUS
Qty: 0 | Refills: 0 | Status: COMPLETED | OUTPATIENT
Start: 2020-09-10

## 2020-11-20 RX ORDER — OMALIZUMAB 150 MG/ML
150 INJECTION, SOLUTION SUBCUTANEOUS
Qty: 0 | Refills: 0 | Status: COMPLETED | OUTPATIENT
Start: 2020-09-10

## 2020-11-23 ENCOUNTER — APPOINTMENT (OUTPATIENT)
Dept: PULMONOLOGY | Facility: CLINIC | Age: 80
End: 2020-11-23
Payer: COMMERCIAL

## 2020-11-23 VITALS
HEART RATE: 85 BPM | SYSTOLIC BLOOD PRESSURE: 145 MMHG | BODY MASS INDEX: 43.4 KG/M2 | DIASTOLIC BLOOD PRESSURE: 65 MMHG | RESPIRATION RATE: 18 BRPM | OXYGEN SATURATION: 96 % | TEMPERATURE: 98.7 F | WEIGHT: 293 LBS | HEIGHT: 69 IN

## 2020-11-23 PROCEDURE — 99214 OFFICE O/P EST MOD 30 MIN: CPT

## 2020-11-23 RX ORDER — VALACYCLOVIR 1 G/1
1 TABLET, FILM COATED ORAL
Qty: 4 | Refills: 0 | Status: DISCONTINUED | COMMUNITY
Start: 2020-07-21

## 2020-11-23 RX ORDER — CEPHALEXIN 500 MG/1
500 CAPSULE ORAL
Qty: 14 | Refills: 0 | Status: DISCONTINUED | COMMUNITY
Start: 2020-09-08

## 2020-11-23 NOTE — ASSESSMENT
[FreeTextEntry1] : Mr. Jimenez is an 80 year old male with a history of asthma, allergies, chronic mucopurulent bronchitis, GERD, OSAS, HTN, HLD, hypothyroidism, bladder/prostate issue; anxiety/depression, non-smoker, now coming back for pulmonary re-evaluation. He is presenting today with SOB. \par \par His shortness of breath is felt to be multifactorial due to:\par -severe persistent asthma, controlled \par -poor breathing mechanics\par -emphysema\par -overweight / out of shape\par -? CAD (CHF)\par - ?iron deficiency \par -? tracheobronchomalacia (50%)\par \par Problem 1a: Cardiac \par - evaluation with Dr. Guzman \par - blood work check: BNP \par \par Problem 1B: ?PE\par IF Blood clots are noted w/in your lungs diagnosed by either abnormal CTPA or ventilation perfusion scan. You will need a hypercoagulable work up done by a hematologist to attempt to identify the etiology of this problem. Anticoagulation will be needed for at least 6 months- drugs included are Coumadin, lovenox, arixtra, or another agent. Repeat CTPA or VQ scan will be needed in approximately 6-8 weeks. An echocardiogram may be needed to assess for right ventricular function and pulmonary hypertension. The importance of hydration, ambulation, and regular/consistent diet are extremely important. \par \par Problem 1c: ?iron deficiency\par - blood work check: iron studies \par \par \par problem 1: severe persistent asthma / COPD / emphysema (stable)\par -continue DuoNeb by the nebulizer TID to QiD\par -continue Symbicort 160 2 inhalations BID then Qvar 80 at 2 puffs BID\par -continue Incruse 1 inhalation QD \par -continue Singulair 10 mg QHS \par -continue Daliresp 500 mg QD\par -Asthma is believed to be caused by inherited (genetic) and environmental factor, but its exact cause is unknown. Asthma may be triggered by allergens, lung infections, or irritants in the air. Asthma triggers are different for each person\par -Inhaler technique reviewed as well as oral hygiene techniques reviewed with patient. Avoidance of cold air, extremes of temperature, rescue inhaler should be used before exercise. Order of medication reviewed with patient. Recommended use of a cool mist humidifier in the bedroom. \par \par problem 2: poor breathing mechanics / poor balance\par -recommended to get balance therapy\par -Proper breathing techniques were reviewed with an emphasis of exhalation. Patient instructed to breath in for 1 second and out for four seconds. Patient was encouraged to not talk while walking. \par \par problem 3: abnormal chest CT/ r/o TBM (50%)\par -c/w mucus plugging\par -recommended to add acapella device to be done multiple times daily \par -follow up chest CT due June 2021\par \par Problem 3A: Chronic mucopurulent bronchitis\par -set up chest vest therapy\par -patient tried and failed acapella device therapy\par Patient has a chronic cough greater than 6 months, tried and failed manual chest physiotherapy at home, no skilled caregiver available at home to perform manual CPT, tried and failed acapella vibratory physiotherapy, and recommended chest vest therapy \par \par problem 4: allergic rhinitis\par -recommended to try Navage nasal system \par -continue Astelin 0.15% 1 inhalation each nostril BID \par -continue Clarinex 5 mg QHS \par -continue Qnasl 1 sniff/nostril BID\par -Recommending Zaditor 1 eye drop BID\par -Environmental measures for allergies were encouraged including mattress and pillow cover, air purifier, and environmental controls.\par \par problem 5: r/o ABPA \par -Based on blood work (not present)\par \par problem 6: obesity \par -recommended to read 'Bright Spots and Landmines' by author Toi Gillette and 'Obesity Code' by Jason Reis \par -Weight loss, exercise, and diet control were discussed and are highly encouraged. Treatment options were given such as, aqua therapy, and contacting a nutritionist. Recommended to use the elliptical, stationary bike, less use of treadmill. Obesity is associated with worsening asthma, shortness of breath, and potential for cardiac disease, diabetes, and other underlying medical conditions.\par \par problem 7: OSAS (stable now)\par -continue CPAP (increased pressure necessary)- compliant\par -Sleep apnea is associated with adverse clinical consequences which an affect most organ systems. Cardiovascular disease risk includes arrhythmias, atrial fibrillation, hypertension, coronary artery disease, and stroke. Metabolic disorders include diabetes type 2, non-alcoholic fatty liver disease. Mood disorder especially depression; and cognitive decline especially in the elderly. Associations with chronic reflux/Mancera’s esophagus some but not all inclusive. \par -Reasons to assess this include arousal consistent with hypopnea; respiratory events most prominent in REM sleep or supine position; therefore sleep staging and body position are important for accurate diagnosis and estimation of AHI. \par \par Problem 8: elevated IgE level \par -Received Xolair today after 3 month hiatus\par -Xolair is a recombinant DNA- derived humanized IgG1K monoclonal antibody that selectively binds to human immunoglobulin E (IgE). Xolair is produced by a Chinese hamster ovary cell suspension culture in nutrient medium containing the antibiotic gentamicin. Gentamicin is not detectable in the final product. Xolair is a sterile, white, preservative free, lyophilized powder contained in a single use vial that is reconstituted with sterile water for suspension. Side effects include: wheezing, tightness of the chest, trouble breathing, hives, skin rash, feeling anxious or light-headed, fainting, warmth or tingling under skin, or swelling of face, lips, or tongue \par \par problem 9: Health Maintenance/COVID19 Precautions:\par - Clean your hands often. Wash your hands often with soap and water for at least 20 seconds, especially after blowing your nose, coughing, or sneezing, or having been in a public place.\par - If soap and water are not available, use a hand  that contains at least 60% alcohol.\par - To the extent possible, avoid touching high-touch surfaces in public places - elevator buttons, door handles, handrails, handshaking with people, etc. Use a tissue or your sleeve to cover your hand or finger if you must touch something.\par - Wash your hands after touching surfaces in public places.\par - Avoid touching your face, nose, eyes, etc.\par - Clean and disinfect your home to remove germs: practice routine cleaning of frequently touched surfaces (for example: tables, doorknobs, light switches, handles, desks, toilets, faucets, sinks & cell phones)\par - Avoid crowds, especially in poorly ventilated spaces. Your risk of exposure to respiratory viruses like COVID-19 may increase in crowded, closed-in settings with little air circulation if there are people in the crowd who are sick. All patients are recommended to practice social distancing and stay at least 6 feet away from others.\par - Avoid all non-essential travel including plane trips, and especially avoid embarking on cruise ships.\par -If COVID-19 is spreading in your community, take extra measures to put distance between yourself and other people to further reduce your risk of being exposed to this new virus.\par -Stay home as much as possible.\par - Consider ways of getting food brought to your house through family, social, or commercial networks\par -Be aware that the virus has been known to live in the air up to 3 hours post exposure, cardboard up to 24 hours post exposure, copper up to 4 hours post exposure, steel and plastic up to 2-3 days post exposure. Risk of transmission from these surfaces are affected by many variables.\par Immune Support Recommendations:\par -OTC Vitamin C 500mg BID \par -OTC Quercetin 250-500mg BID \par -OTC Zinc 75-100mg per day \par -OTC Melatonin 1 or 2 mg a night \par -OTC Vitamin D 1-4000mg per day \par -OTC Tonic Water 8oz per day\par Asthma and COVID19:\par You need to make sure your asthma is under control. This often requires the use of inhaled corticosteroids (and sometimes oral corticosteroids). Inhaled corticosteroids do not likely reduce your immune system’s ability to fight infections, but oral corticosteroids may. It is important to use the steps above to protect yourself to limit your exposure to any respiratory virus. \par \par Problem 10: health maintenance \par -s/p influenza vaccination 2019\par -recommended strep pneumonia vaccines: Prevnar-13 vaccine, followed by Pneumo vaccine 23 one year following (completed)\par -recommended early intervention for URIs\par -recommended regular osteoporosis evaluations\par -recommended early dermatological evaluations\par -recommended after the age of 50 to the age of 70, colonoscopy every 5 years \par  \par F/U in 3-4 weeks \par He is encouraged to call with any changes, concerns, or questions.

## 2020-11-23 NOTE — HISTORY OF PRESENT ILLNESS
[FreeTextEntry1] : Mr. Jimenez is a 80 year old male presenting to the office for a follow up visit for abnormal chest CT, elevated IgE level, severe asthma, KHADRA, postnasal drip, poor balance, snoring and shortness of breath. His chief complaint is \par - he notes he has gained a lot of weight\par - he feels he has not been doing well\par - he notes he has not been doing much exercise\par - he notes he went to his Dr. and was told he should double the amount of Lasix he's using but it has not helped his swelling\par - he notes he has been eating a well balance meal, though he has to get rid of the bread he has been eating\par - he saw a cardiologist awhile ago. \par - he notes he just feels he cant breathe to his full capacity \par - he has been sleeping well but he wakes up 3-4 times to use the bathroom, otherwise he cant sleep without the CPAP mask on. \par - he notes his stool is dark brown, no black. \par - he notes the simplest thing will leave him out of breath \par - He  denies any visual issues, headaches, nausea, vomiting, fever, chills, sweats, diarrhea, constipation, dysphagia, myalgia, dizziness, leg swelling, leg pain, itchy eyes, itchy ears.

## 2020-11-23 NOTE — PHYSICAL EXAM
[General Appearance - Well Developed] : well developed [Normal Appearance] : normal appearance [Well Groomed] : well groomed [General Appearance - Well Nourished] : well nourished [No Deformities] : no deformities [General Appearance - In No Acute Distress] : no acute distress [Normal Conjunctiva] : the conjunctiva exhibited no abnormalities [Eyelids - No Xanthelasma] : the eyelids demonstrated no xanthelasmas [Normal Oropharynx] : normal oropharynx [III] : III [Neck Appearance] : the appearance of the neck was normal [Neck Cervical Mass (___cm)] : no neck mass was observed [Jugular Venous Distention Increased] : there was no jugular-venous distention [Thyroid Diffuse Enlargement] : the thyroid was not enlarged [Thyroid Nodule] : there were no palpable thyroid nodules [Heart Rate And Rhythm] : heart rate and rhythm were normal [Heart Sounds] : normal S1 and S2 [Murmurs] : no murmurs present [Respiration, Rhythm And Depth] : normal respiratory rhythm and effort [Exaggerated Use Of Accessory Muscles For Inspiration] : no accessory muscle use [Auscultation Breath Sounds / Voice Sounds] : lungs were clear to auscultation bilaterally [Abdomen Soft] : soft [Abdomen Tenderness] : non-tender [Abdomen Mass (___ Cm)] : no abdominal mass palpated [Abnormal Walk] : normal gait [Gait - Sufficient For Exercise Testing] : the gait was sufficient for exercise testing [Nail Clubbing] : no clubbing of the fingernails [Cyanosis, Localized] : no localized cyanosis [Petechial Hemorrhages (___cm)] : no petechial hemorrhages [Skin Color & Pigmentation] : normal skin color and pigmentation [] : no rash [No Venous Stasis] : no venous stasis [Skin Lesions] : no skin lesions [No Skin Ulcers] : no skin ulcer [No Xanthoma] : no  xanthoma was observed [Deep Tendon Reflexes (DTR)] : deep tendon reflexes were 2+ and symmetric [Sensation] : the sensory exam was normal to light touch and pinprick [No Focal Deficits] : no focal deficits [Oriented To Time, Place, And Person] : oriented to person, place, and time [Impaired Insight] : insight and judgment were intact [Affect] : the affect was normal [FreeTextEntry1] : I:E 1:3, clear [FreeTextEntry2] : 2+ edema mid calf

## 2020-11-23 NOTE — ADDENDUM
[FreeTextEntry1] : Documented by Amarilys Garcia acting as a scribe for Dr. Jason Zeng on 11/23/2020 \par \par All medical record entries made by the Scribe were at my, Dr. Jason Zeng's, direction and personally dictated by me on 11/23/2020 . I have reviewed the chart and agree that the record accurately reflects my personal performance of the history, physical exam, assessment and plan. I have also personally directed, reviewed, and agree with the discharge instructions.

## 2020-12-05 ENCOUNTER — LABORATORY RESULT (OUTPATIENT)
Age: 80
End: 2020-12-05

## 2020-12-06 LAB
DEPRECATED D DIMER PPP IA-ACNC: 264 NG/ML DDU
FERRITIN SERPL-MCNC: 78 NG/ML
IRON SATN MFR SERPL: 19 %
IRON SERPL-MCNC: 68 UG/DL
TIBC SERPL-MCNC: 360 UG/DL
UIBC SERPL-MCNC: 292 UG/DL

## 2020-12-07 LAB
BASOPHILS # BLD AUTO: 0.12 K/UL
BASOPHILS NFR BLD AUTO: 0.9 %
EOSINOPHIL # BLD AUTO: 0 K/UL
EOSINOPHIL NFR BLD AUTO: 0 %
HCT VFR BLD CALC: 39.3 %
HGB BLD-MCNC: 13.6 G/DL
LYMPHOCYTES # BLD AUTO: 7.6 K/UL
LYMPHOCYTES NFR BLD AUTO: 59.3 %
MAN DIFF?: NORMAL
MCHC RBC-ENTMCNC: 34.6 GM/DL
MCHC RBC-ENTMCNC: 36.6 PG
MCV RBC AUTO: 105.6 FL
MONOCYTES # BLD AUTO: 0.23 K/UL
MONOCYTES NFR BLD AUTO: 1.8 %
NEUTROPHILS # BLD AUTO: 2.15 K/UL
NEUTROPHILS NFR BLD AUTO: 16.8 %
PLATELET # BLD AUTO: 141 K/UL
RBC # BLD: 3.72 M/UL
RBC # FLD: 17.5 %
WBC # FLD AUTO: 12.81 K/UL

## 2020-12-10 ENCOUNTER — APPOINTMENT (OUTPATIENT)
Dept: PULMONOLOGY | Facility: CLINIC | Age: 80
End: 2020-12-10
Payer: COMMERCIAL

## 2020-12-10 VITALS
HEIGHT: 69 IN | SYSTOLIC BLOOD PRESSURE: 160 MMHG | OXYGEN SATURATION: 94 % | RESPIRATION RATE: 18 BRPM | HEART RATE: 88 BPM | DIASTOLIC BLOOD PRESSURE: 78 MMHG

## 2020-12-10 DIAGNOSIS — R79.89 OTHER SPECIFIED ABNORMAL FINDINGS OF BLOOD CHEMISTRY: ICD-10-CM

## 2020-12-10 PROCEDURE — 96372 THER/PROPH/DIAG INJ SC/IM: CPT

## 2020-12-10 RX ORDER — OMALIZUMAB 150 MG/ML
150 INJECTION, SOLUTION SUBCUTANEOUS
Qty: 0 | Refills: 0 | Status: COMPLETED | OUTPATIENT
Start: 2020-12-10

## 2020-12-10 RX ORDER — OMALIZUMAB 75 MG/.5ML
75 INJECTION, SOLUTION SUBCUTANEOUS
Qty: 0 | Refills: 0 | Status: COMPLETED | OUTPATIENT
Start: 2020-12-10

## 2020-12-11 ENCOUNTER — NON-APPOINTMENT (OUTPATIENT)
Age: 80
End: 2020-12-11

## 2020-12-23 ENCOUNTER — LABORATORY RESULT (OUTPATIENT)
Age: 80
End: 2020-12-23

## 2020-12-28 ENCOUNTER — APPOINTMENT (OUTPATIENT)
Dept: PULMONOLOGY | Facility: CLINIC | Age: 80
End: 2020-12-28
Payer: COMMERCIAL

## 2020-12-28 ENCOUNTER — TRANSCRIPTION ENCOUNTER (OUTPATIENT)
Age: 80
End: 2020-12-28

## 2020-12-28 VITALS
OXYGEN SATURATION: 95 % | SYSTOLIC BLOOD PRESSURE: 150 MMHG | BODY MASS INDEX: 42.8 KG/M2 | WEIGHT: 289 LBS | HEART RATE: 95 BPM | TEMPERATURE: 97.7 F | DIASTOLIC BLOOD PRESSURE: 70 MMHG | HEIGHT: 69 IN | RESPIRATION RATE: 17 BRPM

## 2020-12-28 PROCEDURE — 94799 UNLISTED PULMONARY SVC/PX: CPT

## 2020-12-28 PROCEDURE — 94010 BREATHING CAPACITY TEST: CPT

## 2020-12-28 PROCEDURE — 94729 DIFFUSING CAPACITY: CPT

## 2020-12-28 PROCEDURE — 96372 THER/PROPH/DIAG INJ SC/IM: CPT

## 2020-12-28 PROCEDURE — 99214 OFFICE O/P EST MOD 30 MIN: CPT | Mod: 25

## 2020-12-28 PROCEDURE — 94618 PULMONARY STRESS TESTING: CPT

## 2020-12-28 RX ORDER — OMALIZUMAB 75 MG/.5ML
75 INJECTION, SOLUTION SUBCUTANEOUS
Qty: 0 | Refills: 0 | Status: COMPLETED | OUTPATIENT
Start: 2020-12-28

## 2020-12-28 RX ORDER — OMALIZUMAB 150 MG/ML
150 INJECTION, SOLUTION SUBCUTANEOUS
Qty: 0 | Refills: 0 | Status: COMPLETED | OUTPATIENT
Start: 2020-12-28

## 2020-12-28 RX ADMIN — OMALIZUMAB 0 MG/0.5ML: 75 INJECTION, SOLUTION SUBCUTANEOUS at 00:00

## 2020-12-28 RX ADMIN — OMALIZUMAB 0 MG/ML: 150 INJECTION, SOLUTION SUBCUTANEOUS at 00:00

## 2020-12-28 NOTE — ADDENDUM
[FreeTextEntry1] : Documented by Neil Solitario acting as a scribe for Dr. Jason Zeng on 12/28/2020.\par \par All medical record entries made by the Scribe were at my, Dr. Jason Zeng's, direction and personally dictated by me on 12/28/2020 . I have reviewed the chart and agree that the record accurately reflects my personal performance of the history, physical exam, assessment and plan. I have also personally directed, reviewed, and agree with the discharge instructions. \par

## 2020-12-28 NOTE — PROCEDURE
[FreeTextEntry1] : CTA Chest with and without contrast (12.21.2020) reveals no CT evidence of pulmonary embolus. findings consistent with liver cirrhosis and portal hypertension. the liver demonstrates a nodular contour. the spleen is enlarged. multiple subcentimenter noncalcified pulmonary nodules identified bilaterally. the large noncalcified pulmonary nodule seen in the left lower lobe measures 9x9 mm. follow up is recommended. \par \par 6 minute walk test reveals a low saturation of 92% with moderate evidence of dyspnea or fatigue; walked 65.2   meters\par \par FENO was 19; a normal value being less than 25\par \par Full PFT revealed moderate restrictive and severe obstructive dysfunction, with a FEV1 of 1.59 L, which is 46% of predicted, normal lung volumes, and a diffusion of 19.9 , which is 75 % of predicted, with a flat inspiratory limb. \par \par Fractional exhaled nitric oxide (FENO) is regarded as a simple, noninvasive method for assessing eosinophilic airway inflammation. Produced by a variety of cells within the lung, nitric oxide (NO) concentrations are generally low in healthy individuals. However, high concentrations of NO appear to be involved in nonspecific host defense mechanisms and chronic inflammatory diseases such as asthma. The American Thor[employer] Society (ATS) therefore has recommended using FENO to aid in the diagnosis and monitoring of eosinophilic airway inflammation and asthma, and for identifying steroid responsive individuals whose chronic respiratory symptoms may be caused by airway inflammation.

## 2020-12-28 NOTE — HISTORY OF PRESENT ILLNESS
[FreeTextEntry1] : Mr. Jimenez is a 80 year old male presenting to the office for a follow up visit for abnormal chest CT, elevated IgE level, severe asthma, KHADRA, postnasal drip, poor balance, snoring and shortness of breath. His chief complaint is SOB, weight\par \par -he notes constant Dyspnea on exertion\par -he notes mild intermittent cough is stable\par -he notes in process of dental work\par -he notes regular bowel movements \par -he notes intermittent dizziness\par -he notes intermittent sour taste in mouth exacerbated by dental work\par -he denies dysphonia\par -he notes weight loss 10 lbs intentionally\par -he notes diet improved\par -he denies taking any new medications, vitamins, or supplements\par \par -denies any chest pain, chest pressure, diarrhea, constipation, dysphagia, leg swelling, leg pain, myalgias, arthralgias, itchy eyes, itchy ears, heartburn, or reflux.\par \par

## 2020-12-28 NOTE — PHYSICAL EXAM
[General Appearance - Well Developed] : well developed [Normal Appearance] : normal appearance [Well Groomed] : well groomed [General Appearance - Well Nourished] : well nourished [No Deformities] : no deformities [General Appearance - In No Acute Distress] : no acute distress [Normal Conjunctiva] : the conjunctiva exhibited no abnormalities [Eyelids - No Xanthelasma] : the eyelids demonstrated no xanthelasmas [Normal Oropharynx] : normal oropharynx [III] : III [Neck Appearance] : the appearance of the neck was normal [Neck Cervical Mass (___cm)] : no neck mass was observed [Jugular Venous Distention Increased] : there was no jugular-venous distention [Thyroid Diffuse Enlargement] : the thyroid was not enlarged [Thyroid Nodule] : there were no palpable thyroid nodules [Heart Rate And Rhythm] : heart rate and rhythm were normal [Heart Sounds] : normal S1 and S2 [Murmurs] : no murmurs present [Respiration, Rhythm And Depth] : normal respiratory rhythm and effort [Exaggerated Use Of Accessory Muscles For Inspiration] : no accessory muscle use [Auscultation Breath Sounds / Voice Sounds] : lungs were clear to auscultation bilaterally [Abdomen Soft] : soft [Abdomen Tenderness] : non-tender [Abdomen Mass (___ Cm)] : no abdominal mass palpated [Abnormal Walk] : normal gait [Gait - Sufficient For Exercise Testing] : the gait was sufficient for exercise testing [Nail Clubbing] : no clubbing of the fingernails [Cyanosis, Localized] : no localized cyanosis [Petechial Hemorrhages (___cm)] : no petechial hemorrhages [Skin Color & Pigmentation] : normal skin color and pigmentation [] : no rash [No Venous Stasis] : no venous stasis [Skin Lesions] : no skin lesions [No Skin Ulcers] : no skin ulcer [No Xanthoma] : no  xanthoma was observed [Deep Tendon Reflexes (DTR)] : deep tendon reflexes were 2+ and symmetric [Sensation] : the sensory exam was normal to light touch and pinprick [No Focal Deficits] : no focal deficits [Oriented To Time, Place, And Person] : oriented to person, place, and time [Impaired Insight] : insight and judgment were intact [Affect] : the affect was normal [FreeTextEntry1] : chronic venostasis changes.  [FreeTextEntry2] : 1+ edema mid calf

## 2020-12-28 NOTE — ASSESSMENT
[FreeTextEntry1] : Mr. Jimenez is an 80 year old male with a history of asthma, allergies, chronic mucopurulent bronchitis, GERD, OSAS, HTN, HLD, hypothyroidism, bladder/prostate issue; anxiety/depression, non-smoker, now coming back for pulmonary re-evaluation. He is presenting today with SOB, weight issues. \par \par His shortness of breath is felt to be multifactorial due to:\par -severe persistent asthma, controlled \par -poor breathing mechanics\par -emphysema\par -overweight / out of shape\par -? CAD (CHF)\par - ?iron deficiency \par -? 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 Hutchins \par - blood work check: BNP  q 3-4 months\par \par Problem 1B: ?PE (+) D-dimer- CTPA\par IF Blood clots are noted w/in your lungs diagnosed by either abnormal CTPA or ventilation perfusion scan. You will need a hypercoagulable work up done by a hematologist to attempt to identify the etiology of this problem. Anticoagulation will be needed for at least 6 months- drugs included are Coumadin, lovenox, arixtra, or another agent. Repeat CTPA or VQ scan will be needed in approximately 6-8 weeks. An echocardiogram may be needed to assess for right ventricular function and pulmonary hypertension. The importance of hydration, ambulation, and regular/consistent diet are extremely important. \par \par Problem 1c: ?iron deficiency\par -s/p blood work: iron studies (WNL) \par \par problem 1: severe persistent asthma / COPD / emphysema (stable)\par -continue DuoNeb by the nebulizer TID to QiD\par -continue Symbicort 160 2 inhalations BID then Qvar 80 at 2 puffs BID\par -continue Incruse 1 inhalation QD \par -continue Singulair 10 mg QHS \par -continue Daliresp 500 mg QD\par -Asthma is believed to be caused by inherited (genetic) and environmental factor, but its exact cause is unknown. Asthma may be triggered by allergens, lung infections, or irritants in the air. Asthma triggers are different for each person\par -Inhaler technique reviewed as well as oral hygiene techniques reviewed with patient. Avoidance of cold air, extremes of temperature, rescue inhaler should be used before exercise. Order of medication reviewed with patient. Recommended use of a cool mist humidifier in the bedroom. \par \par problem 2: poor breathing mechanics / poor balance\par -recommended to get balance therapy\par -Proper breathing techniques were reviewed with an emphasis of exhalation. Patient instructed to breath in for 1 second and out for four seconds. Patient was encouraged to not talk while walking. \par \par problem 3: abnormal chest CT/ r/o TBM (50%)\par -c/w mucus plugging\par -recommended to add acapella device to be done multiple times daily \par -follow up chest CT (last 12/2020, next 12/2021) \par \par Problem 3A: Chronic mucopurulent bronchitis\par -chest vest therapy in progress\par -patient tried and failed acapella device therapy\par Patient has a chronic cough greater than 6 months, tried and failed manual chest physiotherapy at home, no skilled caregiver available at home to perform manual CPT, tried and failed acapella vibratory physiotherapy, and recommended chest vest therapy \par \par Problem 3B: Cirrhosis - ?portopulmonary HTN\par -recommended hepatologist evaluation (Jose) \par \par problem 4: allergic rhinitis\par -recommended to try Navage nasal system \par -continue Astelin 0.15% 1 inhalation each nostril BID \par -continue Clarinex 5 mg QHS \par -continue Qnasl 1 sniff/nostril BID\par -Recommending Zaditor 1 eye drop BID\par -Environmental measures for allergies were encouraged including mattress and pillow cover, air purifier, and environmental controls.\par \par problem 5: r/o ABPA \par -Based on blood work (not present)\par \par problem 6: obesity \par -recommended to read 'Bright Spots and Landmines' by author Toi Gillette and 'Obesity Code' by Jason Reis \par -Weight loss, exercise, and diet control were discussed and are highly encouraged. Treatment options were given such as, aqua therapy, and contacting a nutritionist. Recommended to use the elliptical, stationary bike, less use of treadmill. Obesity is associated with worsening asthma, shortness of breath, and potential for cardiac disease, diabetes, and other underlying medical conditions.\par \par problem 7: OSAS (stable now)\par -continue CPAP (increased pressure necessary)- compliant\par -Sleep apnea is associated with adverse clinical consequences which an affect most organ systems. Cardiovascular disease risk includes arrhythmias, atrial fibrillation, hypertension, coronary artery disease, and stroke. Metabolic disorders include diabetes type 2, non-alcoholic fatty liver disease. Mood disorder especially depression; and cognitive decline especially in the elderly. Associations with chronic reflux/Mancera’s esophagus some but not all inclusive. \par -Reasons to assess this include arousal consistent with hypopnea; respiratory events most prominent in REM sleep or supine position; therefore sleep staging and body position are important for accurate diagnosis and estimation of AHI. \par \par Problem 8: elevated IgE level \par -Received Xolair today after 3 month hiatus\par -Xolair is a recombinant DNA- derived humanized IgG1K monoclonal antibody that selectively binds to human immunoglobulin E (IgE). Xolair is produced by a Chinese hamster ovary cell suspension culture in nutrient medium containing the antibiotic gentamicin. Gentamicin is not detectable in the final product. Xolair is a sterile, white, preservative free, lyophilized powder contained in a single use vial that is reconstituted with sterile water for suspension. Side effects include: wheezing, tightness of the chest, trouble breathing, hives, skin rash, feeling anxious or light-headed, fainting, warmth or tingling under skin, or swelling of face, lips, or tongue \par \par problem 9: Health Maintenance/COVID19 Precautions:\par - Clean your hands often. Wash your hands often with soap and water for at least 20 seconds, especially after blowing your nose, coughing, or sneezing, or having been in a public place.\par - If soap and water are not available, use a hand  that contains at least 60% alcohol.\par - To the extent possible, avoid touching high-touch surfaces in public places - elevator buttons, door handles, handrails, handshaking with people, etc. Use a tissue or your sleeve to cover your hand or finger if you must touch something.\par - Wash your hands after touching surfaces in public places.\par - Avoid touching your face, nose, eyes, etc.\par - Clean and disinfect your home to remove germs: practice routine cleaning of frequently touched surfaces (for example: tables, doorknobs, light switches, handles, desks, toilets, faucets, sinks & cell phones)\par - Avoid crowds, especially in poorly ventilated spaces. Your risk of exposure to respiratory viruses like COVID-19 may increase in crowded, closed-in settings with little air circulation if there are people in the crowd who are sick. All patients are recommended to practice social distancing and stay at least 6 feet away from others.\par - Avoid all non-essential travel including plane trips, and especially avoid embarking on cruise ships.\par -If COVID-19 is spreading in your community, take extra measures to put distance between yourself and other people to further reduce your risk of being exposed to this new virus.\par -Stay home as much as possible.\par - Consider ways of getting food brought to your house through family, social, or commercial networks\par -Be aware that the virus has been known to live in the air up to 3 hours post exposure, cardboard up to 24 hours post exposure, copper up to 4 hours post exposure, steel and plastic up to 2-3 days post exposure. Risk of transmission from these surfaces are affected by many variables.\par Immune Support Recommendations:\par -OTC Vitamin C 500mg BID \par -OTC Quercetin 250-500mg BID \par -OTC Zinc 75-100mg per day \par -OTC Melatonin 1 or 2 mg a night \par -OTC Vitamin D 1-4000mg per day \par -OTC Tonic Water 8oz per day\par Asthma and COVID19:\par You need to make sure your asthma is under control. This often requires the use of inhaled corticosteroids (and sometimes oral corticosteroids). Inhaled corticosteroids do not likely reduce your immune system’s ability to fight infections, but oral corticosteroids may. It is important to use the steps above to protect yourself to limit your exposure to any respiratory virus. \par \par Problem 10: health maintenance \par -s/p influenza vaccination 2019\par -recommended strep pneumonia vaccines: Prevnar-13 vaccine, followed by Pneumo vaccine 23 one year following (completed)\par -recommended early intervention for URIs\par -recommended regular osteoporosis evaluations\par -recommended early dermatological evaluations\par -recommended after the age of 50 to the age of 70, colonoscopy every 5 years \par  \par F/U in 3-4 weeks \par He is encouraged to call with any changes, concerns, or questions.

## 2021-01-19 ENCOUNTER — APPOINTMENT (OUTPATIENT)
Dept: PULMONOLOGY | Facility: CLINIC | Age: 81
End: 2021-01-19
Payer: COMMERCIAL

## 2021-01-19 VITALS
SYSTOLIC BLOOD PRESSURE: 150 MMHG | TEMPERATURE: 97.3 F | WEIGHT: 293 LBS | HEIGHT: 69 IN | BODY MASS INDEX: 43.4 KG/M2 | OXYGEN SATURATION: 96 % | DIASTOLIC BLOOD PRESSURE: 80 MMHG | RESPIRATION RATE: 16 BRPM | HEART RATE: 88 BPM

## 2021-01-19 PROCEDURE — 96372 THER/PROPH/DIAG INJ SC/IM: CPT

## 2021-01-19 RX ORDER — OMALIZUMAB 150 MG/ML
150 INJECTION, SOLUTION SUBCUTANEOUS
Qty: 0 | Refills: 0 | Status: COMPLETED | OUTPATIENT
Start: 2021-01-18

## 2021-01-19 RX ORDER — OMALIZUMAB 75 MG/.5ML
75 INJECTION, SOLUTION SUBCUTANEOUS
Qty: 0 | Refills: 0 | Status: COMPLETED | OUTPATIENT
Start: 2021-01-18

## 2021-01-20 ENCOUNTER — APPOINTMENT (OUTPATIENT)
Dept: HEPATOLOGY | Facility: CLINIC | Age: 81
End: 2021-01-20
Payer: COMMERCIAL

## 2021-01-20 ENCOUNTER — LABORATORY RESULT (OUTPATIENT)
Age: 81
End: 2021-01-20

## 2021-01-20 VITALS
HEART RATE: 80 BPM | RESPIRATION RATE: 12 BRPM | DIASTOLIC BLOOD PRESSURE: 74 MMHG | SYSTOLIC BLOOD PRESSURE: 151 MMHG | BODY MASS INDEX: 43.4 KG/M2 | OXYGEN SATURATION: 100 % | HEIGHT: 69 IN | TEMPERATURE: 107 F | WEIGHT: 293 LBS

## 2021-01-20 PROCEDURE — 99072 ADDL SUPL MATRL&STAF TM PHE: CPT

## 2021-01-20 PROCEDURE — 99204 OFFICE O/P NEW MOD 45 MIN: CPT

## 2021-01-20 NOTE — ASSESSMENT
[FreeTextEntry1] : 81 y/o M w/ obesity, mucopurulent chronic bronchitis, moderate restrictive / severe obstructive lung disease, pulmonary nodule, history of alcohol use disorder, suspected BALTAZAR, newly diagnosed cirrhosis here to establish care.\par \par 1. Main issue is dyspnea on exertion which is related to his underlying COPD + possible component of decompensated liver disease that is now making his breathing worse. Differential includes hepatopulmonary disease and portopulmonary hypertension. CHF is also possible.\par - he recently got a TTE w/ cardiologist, will obtain that report. If it was not with agitated saline then we will need to get that done (ideally before next time he comes).\par \par 2. BALTAZAR/ETOH cirrhosis\par    > Esophageal / gastric varices - will need EGD once brathing is sorted out\par    > Ascites - obtain US abdomen\par    > Hepatic encephalopathy - none\par    > HCC screening - obtain US abdomen\par    > HBV/HAV status - check\par    > Transplant candidate - no due to age\par \par RTC 2-3 weeks. Labs today. US abdomen ordered\par \par

## 2021-01-20 NOTE — CONSULT LETTER
[Dear  ___] : Dear  [unfilled], [Consult Letter:] : I had the pleasure of evaluating your patient, [unfilled]. [( Thank you for referring [unfilled] for consultation for _____ )] : Thank you for referring [unfilled] for consultation for [unfilled] [Please see my note below.] : Please see my note below. [Consult Closing:] : Thank you very much for allowing me to participate in the care of this patient.  If you have any questions, please do not hesitate to contact me. [Sincerely,] : Sincerely, [FreeTextEntry2] : Jason Zeng [FreeTextEntry3] : Dr. Rajat Bundy MD\par  of Medicine\par [facility]\par [facility]\par

## 2021-01-20 NOTE — PHYSICAL EXAM
[Scleral Icterus] : No Scleral Icterus [Spider Angioma] : No spider angioma(s) were observed [Asterixis] : no asterixis observed [Jaundice] : No jaundice [Depression] : no depression [General Appearance - Alert] : alert [General Appearance - In No Acute Distress] : in no acute distress [Sclera] : the sclera and conjunctiva were normal [Neck Appearance] : the appearance of the neck was normal [Auscultation Breath Sounds / Voice Sounds] : lungs were clear to auscultation bilaterally [Heart Rate And Rhythm] : heart rate was normal and rhythm regular [Heart Sounds] : normal S1 and S2 [Heart Sounds Gallop] : no gallops [Murmurs] : no murmurs [Heart Sounds Pericardial Friction Rub] : no pericardial rub [Bowel Sounds] : normal bowel sounds [Abdomen Soft] : soft [Abdomen Tenderness] : non-tender [] : no hepato-splenomegaly [Abdomen Mass (___ Cm)] : no abdominal mass palpated [Oriented To Time, Place, And Person] : oriented to person, place, and time [Impaired Insight] : insight and judgment were intact [Affect] : the affect was normal

## 2021-01-20 NOTE — HISTORY OF PRESENT ILLNESS
[de-identified] : 79 y/o M w/ obesity, mucopurulent chronic bronchitis, moderate restrictive / severe obstructive lung disease, pulmonary nodule, history of alcohol use disorder, suspected BALTAZAR, newly diagnosed cirrhosis here to establish care.\par \par Breathing is becoming worse recently. Could walk 3460-2530 steps in the summer 2020. He gets more short of breath when he is standing up and moving around. \par Recently had a TTE w/ cardiologist which per patient was remarkable. \par History of alcohol use disorder requiring inpatient alcohol rehab and detox 13 times. He is 7 years sober, never been told there was anything wrong with his liver.\par Lives with wife, step son + girlfriend. Lives in [facility] [place]par Cardiologist - Dr. Pablo Hutchins at [facility].\par 12/7/20 BW - .6, \par CT w/ contrast of chest - nodular contour of the liver consistent with cirrhosis. Diffuse low attenuation c/w fatty infiltration. Spleen measuring 17 cm. \par Never had a EGD, had a colonoscopy in 2018.

## 2021-01-21 LAB
ALBUMIN SERPL ELPH-MCNC: 4.8 G/DL
ALP BLD-CCNC: 96 U/L
ALT SERPL-CCNC: 28 U/L
ANION GAP SERPL CALC-SCNC: 13 MMOL/L
AST SERPL-CCNC: 29 U/L
BASOPHILS # BLD AUTO: 0.13 K/UL
BASOPHILS NFR BLD AUTO: 0.9 %
BILIRUB SERPL-MCNC: 0.3 MG/DL
BUN SERPL-MCNC: 16 MG/DL
CALCIUM SERPL-MCNC: 10.3 MG/DL
CHLORIDE SERPL-SCNC: 100 MMOL/L
CO2 SERPL-SCNC: 27 MMOL/L
CREAT SERPL-MCNC: 1.17 MG/DL
EOSINOPHIL # BLD AUTO: 0 K/UL
EOSINOPHIL NFR BLD AUTO: 0 %
FERRITIN SERPL-MCNC: 150 NG/ML
GGT SERPL-CCNC: 144 U/L
GLUCOSE SERPL-MCNC: 103 MG/DL
HBV CORE IGG+IGM SER QL: NONREACTIVE
HBV SURFACE AB SER QL: NONREACTIVE
HBV SURFACE AG SER QL: NONREACTIVE
HCT VFR BLD CALC: 44.7 %
HCV AB SER QL: NONREACTIVE
HCV S/CO RATIO: 0.05 S/CO
HEPATITIS A IGG ANTIBODY: NONREACTIVE
HGB BLD-MCNC: 14 G/DL
INR PPP: 1.09 RATIO
LYMPHOCYTES # BLD AUTO: 7.89 K/UL
LYMPHOCYTES NFR BLD AUTO: 53 %
MAN DIFF?: NORMAL
MCHC RBC-ENTMCNC: 30.6 PG
MCHC RBC-ENTMCNC: 31.3 GM/DL
MCV RBC AUTO: 97.8 FL
MONOCYTES # BLD AUTO: 0.39 K/UL
MONOCYTES NFR BLD AUTO: 2.6 %
NEUTROPHILS # BLD AUTO: 4.27 K/UL
NEUTROPHILS NFR BLD AUTO: 28.7 %
PLATELET # BLD AUTO: 158 K/UL
POTASSIUM SERPL-SCNC: 4.2 MMOL/L
PROT SERPL-MCNC: 7 G/DL
PT BLD: 12.9 SEC
RBC # BLD: 4.57 M/UL
RBC # FLD: 15 %
SODIUM SERPL-SCNC: 140 MMOL/L
WBC # FLD AUTO: 14.89 K/UL

## 2021-01-22 LAB
ESTIMATED AVERAGE GLUCOSE: 143 MG/DL
HBA1C MFR BLD HPLC: 6.6 %

## 2021-01-27 ENCOUNTER — APPOINTMENT (OUTPATIENT)
Dept: ULTRASOUND IMAGING | Facility: CLINIC | Age: 81
End: 2021-01-27
Payer: COMMERCIAL

## 2021-01-27 ENCOUNTER — OUTPATIENT (OUTPATIENT)
Dept: OUTPATIENT SERVICES | Facility: HOSPITAL | Age: 81
LOS: 1 days | End: 2021-01-27
Payer: COMMERCIAL

## 2021-01-27 DIAGNOSIS — E66.9 OBESITY, UNSPECIFIED: ICD-10-CM

## 2021-01-27 PROCEDURE — 76700 US EXAM ABDOM COMPLETE: CPT

## 2021-01-27 PROCEDURE — 76700 US EXAM ABDOM COMPLETE: CPT | Mod: 26

## 2021-02-02 ENCOUNTER — APPOINTMENT (OUTPATIENT)
Dept: PULMONOLOGY | Facility: CLINIC | Age: 81
End: 2021-02-02

## 2021-02-05 ENCOUNTER — APPOINTMENT (OUTPATIENT)
Dept: HEPATOLOGY | Facility: CLINIC | Age: 81
End: 2021-02-05
Payer: COMMERCIAL

## 2021-02-05 VITALS
SYSTOLIC BLOOD PRESSURE: 146 MMHG | RESPIRATION RATE: 12 BRPM | DIASTOLIC BLOOD PRESSURE: 74 MMHG | TEMPERATURE: 98.2 F | HEART RATE: 92 BPM | WEIGHT: 297 LBS | BODY MASS INDEX: 43.99 KG/M2 | HEIGHT: 69 IN

## 2021-02-05 PROCEDURE — 99213 OFFICE O/P EST LOW 20 MIN: CPT

## 2021-02-05 PROCEDURE — 99072 ADDL SUPL MATRL&STAF TM PHE: CPT

## 2021-02-05 NOTE — CONSULT LETTER
[Dear  ___] : Dear  [unfilled], [Consult Letter:] : I had the pleasure of evaluating your patient, [unfilled]. [( Thank you for referring [unfilled] for consultation for _____ )] : Thank you for referring [unfilled] for consultation for [unfilled] [Please see my note below.] : Please see my note below. [Consult Closing:] : Thank you very much for allowing me to participate in the care of this patient.  If you have any questions, please do not hesitate to contact me. [Sincerely,] : Sincerely, [FreeTextEntry2] : Jason Zeng [FreeTextEntry3] : Dr. Rajat Bundy MD\par  of Medicine\par [facility]\par [facility] [place]\par

## 2021-02-05 NOTE — ASSESSMENT
[FreeTextEntry1] : 79 y/o M w/ obesity, mucopurulent chronic bronchitis, moderate restrictive / severe obstructive lung disease, pulmonary nodule, history of alcohol use disorder, suspected BALTAZAR here for f/u regarding ?cirrhosis.\par \par 1. Main issue is dyspnea on exertion which is related to his underlying COPD + atelectasis.\par - he recently got a TTE w/ cardiologist, will obtain that report\par - initially I thought he may have had a component of liver disease driving his breathing issues but I do not thing this is the case. He has no fibrosis at all on his Fibroscan and his liver function / lab tests do not support advance liver disease or portal hypertension. I do not think he needs a agitated saline TTE to rule in hepatopulmonary syndrome.\par \par RTC prn\par

## 2021-02-05 NOTE — PHYSICAL EXAM
[General Appearance - Alert] : alert [General Appearance - In No Acute Distress] : in no acute distress [Sclera] : the sclera and conjunctiva were normal [Neck Appearance] : the appearance of the neck was normal [Auscultation Breath Sounds / Voice Sounds] : lungs were clear to auscultation bilaterally [Heart Rate And Rhythm] : heart rate was normal and rhythm regular [Heart Sounds] : normal S1 and S2 [Heart Sounds Gallop] : no gallops [Murmurs] : no murmurs [Heart Sounds Pericardial Friction Rub] : no pericardial rub [Bowel Sounds] : normal bowel sounds [Abdomen Soft] : soft [Abdomen Tenderness] : non-tender [] : no hepato-splenomegaly [Abdomen Mass (___ Cm)] : no abdominal mass palpated [Oriented To Time, Place, And Person] : oriented to person, place, and time [Impaired Insight] : insight and judgment were intact [Affect] : the affect was normal [Scleral Icterus] : No Scleral Icterus [Spider Angioma] : No spider angioma(s) were observed [Asterixis] : no asterixis observed [Jaundice] : No jaundice [Depression] : no depression

## 2021-02-05 NOTE — HISTORY OF PRESENT ILLNESS
[de-identified] : 81 y/o M w/ obesity, mucopurulent chronic bronchitis, moderate restrictive / severe obstructive lung disease, pulmonary nodule, history of alcohol use disorder, suspected BALTAZAR here for f/u regarding ?cirrhosis.\par \par Breathing is becoming worse recently. Could walk 2460-9063 steps in the summer 2020. He gets more short of breath when he is standing up and moving around. \par Recently had a TTE w/ cardiologist which per patient was remarkable. \par History of alcohol use disorder requiring inpatient alcohol rehab and detox 13 times. He is 7 years sober, never been told there was anything wrong with his liver.\par Lives with wife, step son + girlfriend. Lives in [facility].\par Cardiologist - Dr. Pablo Hutchins at [facility].\par 12/7/20 BW - .6, \par CT w/ contrast of chest - nodular contour of the liver consistent with cirrhosis. Diffuse low attenuation c/w fatty infiltration. Spleen measuring 17 cm. \par Never had a EGD, had a colonoscopy in 2018. \par Fibroscan 2/4/21 -  c/w S3, 7.1 kPa c/w F0-F1.\par 2/4/21 - breathing is about the same.

## 2021-03-22 ENCOUNTER — APPOINTMENT (OUTPATIENT)
Dept: PULMONOLOGY | Facility: CLINIC | Age: 81
End: 2021-03-22
Payer: COMMERCIAL

## 2021-03-22 VITALS
WEIGHT: 293 LBS | BODY MASS INDEX: 43.4 KG/M2 | SYSTOLIC BLOOD PRESSURE: 150 MMHG | HEART RATE: 74 BPM | TEMPERATURE: 97.3 F | OXYGEN SATURATION: 96 % | RESPIRATION RATE: 16 BRPM | DIASTOLIC BLOOD PRESSURE: 74 MMHG | HEIGHT: 69 IN

## 2021-03-22 PROCEDURE — 99214 OFFICE O/P EST MOD 30 MIN: CPT | Mod: 25

## 2021-03-22 PROCEDURE — 99072 ADDL SUPL MATRL&STAF TM PHE: CPT

## 2021-03-22 PROCEDURE — 96401 CHEMO ANTI-NEOPL SQ/IM: CPT

## 2021-03-22 RX ORDER — OMALIZUMAB 150 MG/ML
150 INJECTION, SOLUTION SUBCUTANEOUS
Qty: 0 | Refills: 0 | Status: COMPLETED | OUTPATIENT
Start: 2021-03-22

## 2021-03-22 RX ORDER — OMALIZUMAB 75 MG/.5ML
75 INJECTION, SOLUTION SUBCUTANEOUS
Qty: 0 | Refills: 0 | Status: COMPLETED | OUTPATIENT
Start: 2021-03-22

## 2021-03-22 RX ADMIN — OMALIZUMAB 0 MG/0.5ML: 75 INJECTION, SOLUTION SUBCUTANEOUS at 00:00

## 2021-03-22 RX ADMIN — OMALIZUMAB 0 MG/ML: 150 INJECTION, SOLUTION SUBCUTANEOUS at 00:00

## 2021-03-22 NOTE — PROCEDURE
[FreeTextEntry1] : CTA Chest (12.21.20) reveals on CT evidence of pulmonary embolus. Findings consistent with liver cirrhosis and portal hypertension. The liver demonstrates a nodular contour. The spleen is enlarged. Multiple subcentimeter noncalcified pulmonary nodules identified bilaterally. The largest noncalcified pulmonary nodules seen in the left lower lobe measures 9x9 mm follow up is recommended. \par \par -Images and procedures reviewed in detail and discussed with patient.

## 2021-03-22 NOTE — ADDENDUM
[FreeTextEntry1] : Documented by Neil Solitario acting as a scribe for Dr. Jason Zeng on 03/22/2021.\par \par All medical record entries made by the Scribe were at my, Dr. Jason Zeng's, direction and personally dictated by me on 03/22/2021 . I have reviewed the chart and agree that the record accurately reflects my personal performance of the history, physical exam, assessment and plan. I have also personally directed, reviewed, and agree with the discharge instructions. \par

## 2021-03-22 NOTE — ASSESSMENT
[FreeTextEntry1] : Mr. Jimenez is an 80 year old male with a history of asthma, allergies, chronic mucopurulent bronchitis, GERD, OSAS, HTN, HLD, hypothyroidism, bladder/prostate issue; anxiety/depression, non-smoker, now coming back for pulmonary re-evaluation. He is presenting today with SOB, weight issues.  #1 sinus issues. \par \par His shortness of breath is felt to be multifactorial due to:\par -severe persistent asthma, controlled \par -poor breathing mechanics\par -emphysema\par -overweight / out of shape\par -? CAD (CHF)\par - ?iron deficiency \par -? 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 Hutchins \par - blood work check: BNP  q 3-4 months\par \par Problem 1B: ?PE (+) D-dimer- CTPA\par IF Blood clots are noted w/in your lungs diagnosed by either abnormal CTPA or ventilation perfusion scan. You will need a hypercoagulable work up done by a hematologist to attempt to identify the etiology of this problem. Anticoagulation will be needed for at least 6 months- drugs included are Coumadin, lovenox, arixtra, or another agent. Repeat CTPA or VQ scan will be needed in approximately 6-8 weeks. An echocardiogram may be needed to assess for right ventricular function and pulmonary hypertension. The importance of hydration, ambulation, and regular/consistent diet are extremely important. \par \par Problem 1c: ?iron deficiency\par -s/p blood work: iron studies (WNL) \par \par problem 1: severe persistent asthma / COPD / emphysema (stable)\par -continue DuoNeb by the nebulizer TID to QiD\par -continue Symbicort 160 2 inhalations BID then Qvar 80 at 2 puffs BID\par -continue Incruse 1 inhalation QD \par -continue Singulair 10 mg QHS \par -continue Daliresp 500 mg QD\par -Asthma is believed to be caused by inherited (genetic) and environmental factor, but its exact cause is unknown. Asthma may be triggered by allergens, lung infections, or irritants in the air. Asthma triggers are different for each person\par -Inhaler technique reviewed as well as oral hygiene techniques reviewed with patient. Avoidance of cold air, extremes of temperature, rescue inhaler should be used before exercise. Order of medication reviewed with patient. Recommended use of a cool mist humidifier in the bedroom. \par \par problem 2: poor breathing mechanics / poor balance\par -recommended to get balance therapy\par -Proper breathing techniques were reviewed with an emphasis of exhalation. Patient instructed to breath in for 1 second and out for four seconds. Patient was encouraged to not talk while walking. \par \par problem 3: abnormal chest CT/ r/o TBM (50%)\par -c/w mucus plugging\par -recommended to add acapella device to be done multiple times daily \par -follow up chest CT (last 12/2020, next 12/2021) \par CAT scans are the only radiological modality to identify abnormalities w/in the lungs with regards to nodules/masses/lymph nodes. Risks, benefits were reviewed in detail. The guidelines for abnormalities include follow up CT scans at various intervals which could range from 6 weeks to 1 year intervals. If there is a change for the worse then consideration for a biopsy will be considered if you are a candidate. Second opinion evaluation with a thoracic surgeon or an interventional radiologist could be offered. \par \par Problem 3A: Chronic mucopurulent bronchitis\par -chest vest therapy in progress\par -patient tried and failed acapella device therapy\par Patient has a chronic cough greater than 6 months, tried and failed manual chest physiotherapy at home, no skilled caregiver available at home to perform manual CPT, tried and failed acapella vibratory physiotherapy, and recommended chest vest therapy \par \par Problem 3B: Cirrhosis - ?portopulmonary HTN\par -recommended hepatologist evaluation (Jose) \par \par problem 4: allergic rhinitis\par -recommended to try Navage nasal system \par -continue Astelin 0.15% 1 inhalation each nostril BID \par -continue Clarinex 5 mg QHS \par -continue Qnasl 1 sniff/nostril BID\par -Recommending Zaditor 1 eye drop BID\par -Environmental measures for allergies were encouraged including mattress and pillow cover, air purifier, and environmental controls.\par \par problem 5: r/o ABPA \par -Based on blood work (not present)\par \par problem 6: obesity \par -recommended to read 'Bright Spots and Landmines' by author Toi Gillette and 'Obesity Code' by Jason Reis \par -Weight loss, exercise, and diet control were discussed and are highly encouraged. Treatment options were given such as, aqua therapy, and contacting a nutritionist. Recommended to use the elliptical, stationary bike, less use of treadmill. Obesity is associated with worsening asthma, shortness of breath, and potential for cardiac disease, diabetes, and other underlying medical conditions.\par \par problem 7: OSAS (stable now)\par -continue CPAP (increased pressure necessary)- compliant\par -Sleep apnea is associated with adverse clinical consequences which an affect most organ systems. Cardiovascular disease risk includes arrhythmias, atrial fibrillation, hypertension, coronary artery disease, and stroke. Metabolic disorders include diabetes type 2, non-alcoholic fatty liver disease. Mood disorder especially depression; and cognitive decline especially in the elderly. Associations with chronic reflux/Mancera’s esophagus some but not all inclusive. \par -Reasons to assess this include arousal consistent with hypopnea; respiratory events most prominent in REM sleep or supine position; therefore sleep staging and body position are important for accurate diagnosis and estimation of AHI. \par \par Problem 8: elevated IgE level \par -Received Xolair today (3.22.21) \par -Xolair is a recombinant DNA- derived humanized IgG1K monoclonal antibody that selectively binds to human immunoglobulin E (IgE). Xolair is produced by a Chinese hamster ovary cell suspension culture in nutrient medium containing the antibiotic gentamicin. Gentamicin is not detectable in the final product. Xolair is a sterile, white, preservative free, lyophilized powder contained in a single use vial that is reconstituted with sterile water for suspension. Side effects include: wheezing, tightness of the chest, trouble breathing, hives, skin rash, feeling anxious or light-headed, fainting, warmth or tingling under skin, or swelling of face, lips, or tongue \par \par problem 9: Health Maintenance/COVID19 Precautions:\par -s/p COVID 19 Vaccine\par - Clean your hands often. Wash your hands often with soap and water for at least 20 seconds, especially after blowing your nose, coughing, or sneezing, or having been in a public place.\par - If soap and water are not available, use a hand  that contains at least 60% alcohol.\par - To the extent possible, avoid touching high-touch surfaces in public places - elevator buttons, door handles, handrails, handshaking with people, etc. Use a tissue or your sleeve to cover your hand or finger if you must touch something.\par - Wash your hands after touching surfaces in public places.\par - Avoid touching your face, nose, eyes, etc.\par - Clean and disinfect your home to remove germs: practice routine cleaning of frequently touched surfaces (for example: tables, doorknobs, light switches, handles, desks, toilets, faucets, sinks & cell phones)\par - Avoid crowds, especially in poorly ventilated spaces. Your risk of exposure to respiratory viruses like COVID-19 may increase in crowded, closed-in settings with little air circulation if there are people in the crowd who are sick. All patients are recommended to practice social distancing and stay at least 6 feet away from others.\par - Avoid all non-essential travel including plane trips, and especially avoid embarking on cruise ships.\par -If COVID-19 is spreading in your community, take extra measures to put distance between yourself and other people to further reduce your risk of being exposed to this new virus.\par -Stay home as much as possible.\par - Consider ways of getting food brought to your house through family, social, or commercial networks\par -Be aware that the virus has been known to live in the air up to 3 hours post exposure, cardboard up to 24 hours post exposure, copper up to 4 hours post exposure, steel and plastic up to 2-3 days post exposure. Risk of transmission from these surfaces are affected by many variables.\par Immune Support Recommendations:\par -OTC Vitamin C 500mg BID \par -OTC Quercetin 250-500mg BID \par -OTC Zinc 75-100mg per day \par -OTC Melatonin 1 or 2 mg a night \par -OTC Vitamin D 1-4000mg per day \par -OTC Tonic Water 8oz per day\par Asthma and COVID19:\par You need to make sure your asthma is under control. This often requires the use of inhaled corticosteroids (and sometimes oral corticosteroids). Inhaled corticosteroids do not likely reduce your immune system’s ability to fight infections, but oral corticosteroids may. It is important to use the steps above to protect yourself to limit your exposure to any respiratory virus. \par \par Problem 10: health maintenance \par -s/p influenza vaccination 2019\par -recommended strep pneumonia vaccines: Prevnar-13 vaccine, followed by Pneumo vaccine 23 one year following (completed)\par -recommended early intervention for URIs\par -recommended regular osteoporosis evaluations\par -recommended early dermatological evaluations\par -recommended after the age of 50 to the age of 70, colonoscopy every 5 years \par  \par F/U in 3-4 weeks \par He is encouraged to call with any changes, concerns, or questions.

## 2021-03-22 NOTE — HISTORY OF PRESENT ILLNESS
[FreeTextEntry1] : Mr. Jimenez is a 80 year old male presenting to the office for a follow up visit for abnormal chest CT, elevated IgE level, severe asthma, KHADRA, postnasal drip, poor balance, snoring and shortness of breath. His chief complaint is SOB, weight\par \par -he notes s/p COVID 19 vaccine\par -he notes intermittent SOB exacerbated by exertion, changes in weather\par -he notes weight stable, but high, looking to lose weight\par -he notes energy levels 8/10 on average\par -he notes appetite stable, eating well\par -he notes sleep quality good\par -he notes sleeping 8 hours a night on average\par -he denies dysphonia\par -he denies wheeze\par -he notes mild intermittent wet productive cough intermittently able to expectorate sputum white in color often swallowing mucus instead. \par -he notes leaky sinuses with mucus production origination top down\par - He  notes nightly compliance with CPAP, tolerating well, and benefitting from therapy \par \par -denies any chest pain, chest pressure, diarrhea, constipation, dysphagia, sour taste in the mouth, dizziness, leg swelling, leg pain, myalgias, arthralgias, itchy eyes, itchy ears, heartburn, or reflux.\par \par

## 2021-03-22 NOTE — PHYSICAL EXAM
[General Appearance - Well Developed] : well developed [Normal Appearance] : normal appearance [Well Groomed] : well groomed [General Appearance - Well Nourished] : well nourished [No Deformities] : no deformities [General Appearance - In No Acute Distress] : no acute distress [Normal Conjunctiva] : the conjunctiva exhibited no abnormalities [Eyelids - No Xanthelasma] : the eyelids demonstrated no xanthelasmas [Normal Oropharynx] : normal oropharynx [III] : III [Neck Appearance] : the appearance of the neck was normal [Neck Cervical Mass (___cm)] : no neck mass was observed [Jugular Venous Distention Increased] : there was no jugular-venous distention [Thyroid Diffuse Enlargement] : the thyroid was not enlarged [Thyroid Nodule] : there were no palpable thyroid nodules [Heart Rate And Rhythm] : heart rate and rhythm were normal [Heart Sounds] : normal S1 and S2 [Murmurs] : no murmurs present [Respiration, Rhythm And Depth] : normal respiratory rhythm and effort [Exaggerated Use Of Accessory Muscles For Inspiration] : no accessory muscle use [Auscultation Breath Sounds / Voice Sounds] : lungs were clear to auscultation bilaterally [Abdomen Soft] : soft [Abdomen Tenderness] : non-tender [Abdomen Mass (___ Cm)] : no abdominal mass palpated [Abnormal Walk] : normal gait [Gait - Sufficient For Exercise Testing] : the gait was sufficient for exercise testing [Nail Clubbing] : no clubbing of the fingernails [Cyanosis, Localized] : no localized cyanosis [Petechial Hemorrhages (___cm)] : no petechial hemorrhages [Skin Color & Pigmentation] : normal skin color and pigmentation [] : no rash [No Venous Stasis] : no venous stasis [Skin Lesions] : no skin lesions [No Skin Ulcers] : no skin ulcer [No Xanthoma] : no  xanthoma was observed [Deep Tendon Reflexes (DTR)] : deep tendon reflexes were 2+ and symmetric [Sensation] : the sensory exam was normal to light touch and pinprick [No Focal Deficits] : no focal deficits [Oriented To Time, Place, And Person] : oriented to person, place, and time [Impaired Insight] : insight and judgment were intact [Affect] : the affect was normal [FreeTextEntry1] : chronic venostasis changes.  [FreeTextEntry2] : 1+ edema lower extremity

## 2021-03-26 ENCOUNTER — LABORATORY RESULT (OUTPATIENT)
Age: 81
End: 2021-03-26

## 2021-03-26 LAB
25(OH)D3 SERPL-MCNC: 36 NG/ML
BASOPHILS # BLD AUTO: 0.05 K/UL
BASOPHILS NFR BLD AUTO: 0.3 %
EOSINOPHIL # BLD AUTO: 0.19 K/UL
EOSINOPHIL NFR BLD AUTO: 1.1 %
HCT VFR BLD CALC: 42.5 %
HGB BLD-MCNC: 13 G/DL
IMM GRANULOCYTES NFR BLD AUTO: 0.1 %
LYMPHOCYTES # BLD AUTO: 12.86 K/UL
LYMPHOCYTES NFR BLD AUTO: 73.2 %
MAN DIFF?: NORMAL
MCHC RBC-ENTMCNC: 30.2 PG
MCHC RBC-ENTMCNC: 30.6 GM/DL
MCV RBC AUTO: 98.6 FL
MONOCYTES # BLD AUTO: 1.83 K/UL
MONOCYTES NFR BLD AUTO: 10.4 %
NEUTROPHILS # BLD AUTO: 2.62 K/UL
NEUTROPHILS NFR BLD AUTO: 14.9 %
PLATELET # BLD AUTO: 139 K/UL
RBC # BLD: 4.31 M/UL
RBC # FLD: 14.6 %
WBC # FLD AUTO: 17.57 K/UL

## 2021-03-27 LAB — 24R-OH-CALCIDIOL SERPL-MCNC: 46.8 PG/ML

## 2021-03-29 LAB
CLAM IGE QN: <0.1 KUA/L
CODFISH IGE QN: <0.1 KUA/L
CORN IGE QN: <0.1 KUA/L
COW MILK IGE QN: <0.1 KUA/L
DEPRECATED CLAM IGE RAST QL: 0
DEPRECATED CODFISH IGE RAST QL: 0
DEPRECATED CORN IGE RAST QL: 0
DEPRECATED COW MILK IGE RAST QL: 0
DEPRECATED EGG WHITE IGE RAST QL: 0
DEPRECATED PEANUT IGE RAST QL: 0
DEPRECATED SCALLOP IGE RAST QL: <0.1 KUA/L
DEPRECATED SESAME SEED IGE RAST QL: 0
DEPRECATED SHRIMP IGE RAST QL: 0
DEPRECATED SOYBEAN IGE RAST QL: 0
DEPRECATED WALNUT IGE RAST QL: 0
DEPRECATED WHEAT IGE RAST QL: 0
EGG WHITE IGE QN: <0.1 KUA/L
PEANUT IGE QN: <0.1 KUA/L
SCALLOP IGE QN: 0
SCALLOP IGE QN: <0.1 KUA/L
SESAME SEED IGE QN: <0.1 KUA/L
SOYBEAN IGE QN: <0.1 KUA/L
TOTAL IGE SMQN RAST: 135 KU/L
WALNUT IGE QN: <0.1 KUA/L
WHEAT IGE QN: <0.1 KUA/L

## 2021-03-30 LAB
A ALTERNATA IGE QN: <0.1 KUA/L
A FUMIGATUS IGE QN: <0.1 KUA/L
C ALBICANS IGE QN: 2.24 KUA/L
C HERBARUM IGE QN: <0.1 KUA/L
CAT DANDER IGE QN: 3.72 KUA/L
COMMON RAGWEED IGE QN: <0.1 KUA/L
D FARINAE IGE QN: 2.51 KUA/L
D PTERONYSS IGE QN: 1.43 KUA/L
DEPRECATED A ALTERNATA IGE RAST QL: 0
DEPRECATED A FUMIGATUS IGE RAST QL: 0
DEPRECATED C ALBICANS IGE RAST QL: 2
DEPRECATED C HERBARUM IGE RAST QL: 0
DEPRECATED CAT DANDER IGE RAST QL: 3
DEPRECATED COMMON RAGWEED IGE RAST QL: 0
DEPRECATED D FARINAE IGE RAST QL: 2
DEPRECATED D PTERONYSS IGE RAST QL: 2
DEPRECATED DOG DANDER IGE RAST QL: 1
DEPRECATED M RACEMOSUS IGE RAST QL: 0
DEPRECATED ROACH IGE RAST QL: 0
DEPRECATED TIMOTHY IGE RAST QL: 0
DEPRECATED WHITE OAK IGE RAST QL: 0
DOG DANDER IGE QN: 0.35 KUA/L
M RACEMOSUS IGE QN: <0.1 KUA/L
ROACH IGE QN: <0.1 KUA/L
TIMOTHY IGE QN: <0.1 KUA/L
WHITE OAK IGE QN: <0.1 KUA/L

## 2021-04-06 ENCOUNTER — NON-APPOINTMENT (OUTPATIENT)
Age: 81
End: 2021-04-06

## 2021-04-07 ENCOUNTER — APPOINTMENT (OUTPATIENT)
Dept: PULMONOLOGY | Facility: CLINIC | Age: 81
End: 2021-04-07
Payer: COMMERCIAL

## 2021-04-07 VITALS
HEART RATE: 71 BPM | TEMPERATURE: 97.2 F | BODY MASS INDEX: 43.55 KG/M2 | SYSTOLIC BLOOD PRESSURE: 150 MMHG | OXYGEN SATURATION: 97 % | RESPIRATION RATE: 16 BRPM | DIASTOLIC BLOOD PRESSURE: 80 MMHG | WEIGHT: 294 LBS | HEIGHT: 69 IN

## 2021-04-07 PROCEDURE — 96401 CHEMO ANTI-NEOPL SQ/IM: CPT

## 2021-04-07 PROCEDURE — 99072 ADDL SUPL MATRL&STAF TM PHE: CPT

## 2021-04-07 RX ORDER — OMALIZUMAB 75 MG/.5ML
75 INJECTION, SOLUTION SUBCUTANEOUS
Qty: 0 | Refills: 0 | Status: COMPLETED | OUTPATIENT
Start: 2021-04-07

## 2021-04-07 RX ORDER — OMALIZUMAB 150 MG/ML
150 INJECTION, SOLUTION SUBCUTANEOUS
Qty: 0 | Refills: 0 | Status: COMPLETED | OUTPATIENT
Start: 2021-04-07

## 2021-04-21 ENCOUNTER — APPOINTMENT (OUTPATIENT)
Dept: PULMONOLOGY | Facility: CLINIC | Age: 81
End: 2021-04-21
Payer: COMMERCIAL

## 2021-04-21 VITALS
SYSTOLIC BLOOD PRESSURE: 150 MMHG | DIASTOLIC BLOOD PRESSURE: 70 MMHG | HEART RATE: 80 BPM | BODY MASS INDEX: 43.25 KG/M2 | RESPIRATION RATE: 16 BRPM | OXYGEN SATURATION: 96 % | TEMPERATURE: 97.3 F | HEIGHT: 69 IN | WEIGHT: 292 LBS

## 2021-04-21 PROCEDURE — 99072 ADDL SUPL MATRL&STAF TM PHE: CPT

## 2021-04-21 PROCEDURE — 96401 CHEMO ANTI-NEOPL SQ/IM: CPT

## 2021-04-21 RX ORDER — OMALIZUMAB 150 MG/ML
150 INJECTION, SOLUTION SUBCUTANEOUS
Qty: 0 | Refills: 0 | Status: COMPLETED | OUTPATIENT
Start: 2021-04-21

## 2021-04-21 RX ORDER — OMALIZUMAB 75 MG/.5ML
75 INJECTION, SOLUTION SUBCUTANEOUS
Qty: 0 | Refills: 0 | Status: COMPLETED | OUTPATIENT
Start: 2021-04-21

## 2021-05-04 ENCOUNTER — RX RENEWAL (OUTPATIENT)
Age: 81
End: 2021-05-04

## 2021-05-10 ENCOUNTER — APPOINTMENT (OUTPATIENT)
Dept: PULMONOLOGY | Facility: CLINIC | Age: 81
End: 2021-05-10
Payer: COMMERCIAL

## 2021-05-10 VITALS
DIASTOLIC BLOOD PRESSURE: 68 MMHG | TEMPERATURE: 97.7 F | OXYGEN SATURATION: 96 % | SYSTOLIC BLOOD PRESSURE: 138 MMHG | HEART RATE: 84 BPM | RESPIRATION RATE: 16 BRPM | WEIGHT: 290 LBS | BODY MASS INDEX: 42.95 KG/M2 | HEIGHT: 69 IN

## 2021-05-10 PROCEDURE — 96401 CHEMO ANTI-NEOPL SQ/IM: CPT

## 2021-05-10 PROCEDURE — 99072 ADDL SUPL MATRL&STAF TM PHE: CPT

## 2021-05-10 RX ORDER — OMALIZUMAB 150 MG/ML
150 INJECTION, SOLUTION SUBCUTANEOUS
Qty: 0 | Refills: 0 | Status: COMPLETED | OUTPATIENT
Start: 2021-05-05

## 2021-05-10 RX ORDER — OMALIZUMAB 75 MG/.5ML
75 INJECTION, SOLUTION SUBCUTANEOUS
Qty: 0 | Refills: 0 | Status: COMPLETED | OUTPATIENT
Start: 2021-05-05

## 2021-05-24 ENCOUNTER — APPOINTMENT (OUTPATIENT)
Dept: PULMONOLOGY | Facility: CLINIC | Age: 81
End: 2021-05-24
Payer: COMMERCIAL

## 2021-05-24 VITALS
BODY MASS INDEX: 42.8 KG/M2 | DIASTOLIC BLOOD PRESSURE: 70 MMHG | RESPIRATION RATE: 17 BRPM | SYSTOLIC BLOOD PRESSURE: 130 MMHG | HEIGHT: 69 IN | HEART RATE: 96 BPM | WEIGHT: 289 LBS | OXYGEN SATURATION: 97 % | TEMPERATURE: 97.7 F

## 2021-05-24 PROCEDURE — 96401 CHEMO ANTI-NEOPL SQ/IM: CPT

## 2021-05-24 RX ORDER — OMALIZUMAB 75 MG/.5ML
75 INJECTION, SOLUTION SUBCUTANEOUS
Qty: 0 | Refills: 0 | Status: COMPLETED | OUTPATIENT
Start: 2021-05-24

## 2021-05-24 RX ORDER — OMALIZUMAB 150 MG/ML
150 INJECTION, SOLUTION SUBCUTANEOUS
Qty: 0 | Refills: 0 | Status: COMPLETED | OUTPATIENT
Start: 2021-05-24

## 2021-06-07 ENCOUNTER — APPOINTMENT (OUTPATIENT)
Dept: PULMONOLOGY | Facility: CLINIC | Age: 81
End: 2021-06-07
Payer: MEDICARE

## 2021-06-07 VITALS
BODY MASS INDEX: 43.4 KG/M2 | DIASTOLIC BLOOD PRESSURE: 60 MMHG | HEIGHT: 69 IN | WEIGHT: 293 LBS | SYSTOLIC BLOOD PRESSURE: 140 MMHG | RESPIRATION RATE: 17 BRPM | TEMPERATURE: 98 F | OXYGEN SATURATION: 96 % | HEART RATE: 78 BPM

## 2021-06-07 PROCEDURE — 96401 CHEMO ANTI-NEOPL SQ/IM: CPT

## 2021-06-07 RX ORDER — OMALIZUMAB 75 MG/.5ML
75 INJECTION, SOLUTION SUBCUTANEOUS
Qty: 0 | Refills: 0 | Status: COMPLETED | OUTPATIENT
Start: 2021-06-07

## 2021-06-07 RX ORDER — OMALIZUMAB 150 MG/ML
150 INJECTION, SOLUTION SUBCUTANEOUS
Qty: 0 | Refills: 0 | Status: COMPLETED | OUTPATIENT
Start: 2021-06-07

## 2021-06-15 ENCOUNTER — NON-APPOINTMENT (OUTPATIENT)
Age: 81
End: 2021-06-15

## 2021-06-16 ENCOUNTER — APPOINTMENT (OUTPATIENT)
Dept: WOUND CARE | Facility: CLINIC | Age: 81
End: 2021-06-16
Payer: COMMERCIAL

## 2021-06-16 VITALS
BODY MASS INDEX: 37.64 KG/M2 | TEMPERATURE: 97.2 F | RESPIRATION RATE: 18 BRPM | DIASTOLIC BLOOD PRESSURE: 73 MMHG | SYSTOLIC BLOOD PRESSURE: 130 MMHG | WEIGHT: 284 LBS | HEART RATE: 74 BPM | HEIGHT: 73 IN

## 2021-06-16 DIAGNOSIS — C91.10 CHRONIC LYMPHOCYTIC LEUKEMIA OF B-CELL TYPE NOT HAVING ACHIEVED REMISSION: ICD-10-CM

## 2021-06-16 DIAGNOSIS — C44.90 UNSPECIFIED MALIGNANT NEOPLASM OF SKIN, UNSPECIFIED: ICD-10-CM

## 2021-06-16 PROCEDURE — 99213 OFFICE O/P EST LOW 20 MIN: CPT

## 2021-06-16 RX ORDER — SULFAMETHOXAZOLE AND TRIMETHOPRIM 400; 80 MG/1; MG/1
400-80 TABLET ORAL TWICE DAILY
Qty: 7 | Refills: 0 | Status: DISCONTINUED | COMMUNITY
Start: 2019-05-23 | End: 2021-06-16

## 2021-06-16 RX ORDER — SODIUM CHLORIDE 9 G/ML
0.9 INJECTION, SOLUTION INTRAVENOUS
Qty: 1 | Refills: 0 | Status: DISCONTINUED | COMMUNITY
Start: 2018-09-11 | End: 2021-06-16

## 2021-06-16 RX ORDER — ALBUTEROL SULFATE 90 UG/1
108 (90 BASE) AEROSOL, METERED RESPIRATORY (INHALATION) EVERY 6 HOURS
Qty: 54 | Refills: 1 | Status: DISCONTINUED | COMMUNITY
Start: 2017-07-11 | End: 2021-06-16

## 2021-06-16 RX ORDER — SODIUM CHLORIDE 9 G/ML
0.9 INJECTION, SOLUTION INTRAVENOUS
Qty: 1 | Refills: 0 | Status: DISCONTINUED | COMMUNITY
Start: 2018-10-10 | End: 2021-06-16

## 2021-06-16 RX ORDER — LIDOCAINE HYDROCHLORIDE 10 MG/ML
1 INJECTION, SOLUTION INFILTRATION; PERINEURAL
Qty: 1 | Refills: 0 | Status: DISCONTINUED | COMMUNITY
Start: 2018-09-11 | End: 2021-06-16

## 2021-06-16 RX ORDER — LIDOCAINE HYDROCHLORIDE 10 MG/ML
1 INJECTION, SOLUTION INFILTRATION; PERINEURAL
Qty: 1 | Refills: 0 | Status: DISCONTINUED | COMMUNITY
Start: 2018-10-10 | End: 2021-06-16

## 2021-06-16 RX ORDER — BUDESONIDE AND FORMOTEROL FUMARATE DIHYDRATE 160; 4.5 UG/1; UG/1
160-4.5 AEROSOL RESPIRATORY (INHALATION) TWICE DAILY
Qty: 30.6 | Refills: 1 | Status: DISCONTINUED | COMMUNITY
Start: 2017-08-31 | End: 2021-06-16

## 2021-06-16 RX ORDER — EPINEPHRINE 0.3 MG/.3ML
0.3 INJECTION INTRAMUSCULAR
Qty: 1 | Refills: 0 | Status: DISCONTINUED | COMMUNITY
Start: 2017-08-31 | End: 2021-06-16

## 2021-06-16 RX ORDER — SULFAMETHOXAZOLE AND TRIMETHOPRIM 800; 160 MG/1; MG/1
800-160 TABLET ORAL TWICE DAILY
Qty: 14 | Refills: 0 | Status: DISCONTINUED | COMMUNITY
Start: 2019-06-05 | End: 2021-06-16

## 2021-06-16 RX ORDER — ASPIRIN 325 MG/1
325 TABLET, FILM COATED ORAL
Refills: 0 | Status: ACTIVE | COMMUNITY

## 2021-06-16 RX ORDER — SODIUM BICARBONATE 84 MG/ML
8.4 INJECTION, SOLUTION INTRAVENOUS
Qty: 1 | Refills: 0 | Status: DISCONTINUED | COMMUNITY
Start: 2018-10-10 | End: 2021-06-16

## 2021-06-16 RX ORDER — SODIUM BICARBONATE 84 MG/ML
8.4 INJECTION, SOLUTION INTRAVENOUS
Qty: 1 | Refills: 0 | Status: DISCONTINUED | COMMUNITY
Start: 2018-09-11 | End: 2021-06-16

## 2021-06-16 RX ORDER — MUPIROCIN 20 MG/G
2 OINTMENT TOPICAL
Qty: 22 | Refills: 0 | Status: DISCONTINUED | COMMUNITY
Start: 2017-08-09 | End: 2021-06-16

## 2021-06-17 NOTE — PLAN
[FreeTextEntry1] : Plan:\par Chelsea. girish pantoja\par Venous studies ordered\par Supplies ordered\par Continue Wear Compression stockings\par Lymphedema pump  ordered\par \par Follow up once venous studies completed in 2 weeks

## 2021-06-17 NOTE — PHYSICAL EXAM
[Normal Thyroid] : the thyroid was normal [Normal Breath Sounds] : Normal breath sounds [Normal Heart Sounds] : normal heart sounds [2+] : left 2+ [Ankle Swelling (On Exam)] : present [Ankle Swelling Bilaterally] : bilaterally  [Ankle Swelling On The Left] : moderate [No HSM] : no hepatosplenomegaly [Skin Ulcer] : ulcer [Alert] : alert [Oriented to Person] : oriented to person [Oriented to Place] : oriented to place [Oriented to Time] : oriented to time [Calm] : calm [Please See PDF for Tissue Analytics] : Please See PDF for [employer] Analytics. [JVD] : no jugular venous distention  [Abdomen Masses] : No abdominal massess [Tender] : was nontender [de-identified] : normal [de-identified] : wnl

## 2021-06-17 NOTE — ASSESSMENT
[Verbal] : Verbal [Patient] : Patient [Good - alert, interested, motivated] : Good - alert, interested, motivated [Demonstrates independently] : demonstrates independently [Dressing changes] : dressing changes [Skin Care] : skin care [Nutrition] : nutrition [FreeTextEntry1] : The patient presents with a wound/scab to the right leg, chronic lymphedema   Swelling noted to the extremity.  \par ROSARIO/PVR ordered\par No clinical sign of infection\par Excisionally debrided\par  apply Chelsea\par Compression stocking ordered\par Clarify image used  showed adequate perfusion\par Recommendation:\par \par Apply lidocaine or topical anesthetic.\par Wash wound with ----0.9% saline.\par Apply ----chelsea, gauze girish\par Apply ---- compression stocking \par Moisturize lower extremities\par Change dressing ---daily/ \par Leg elevation as tolerated\par Encouraged ambulation or exercise.\par Optimization of nutrition.\par Offloading to the wound site.\par \par ---Group II  Pressure relief mattress/boots recommended\par ---Roho cushion recommended\par \par -----Wound supplies ordered via [employer]\par Patient given contact information\par \par -----Homecare orders in place\par \par Follow up appointment scheduled for -----\par chronic lymphedema\par

## 2021-06-17 NOTE — HISTORY OF PRESENT ILLNESS
[FreeTextEntry1] : Mr. CLAYTON SALAZAR post ablation in 2019.   He presents to the office with a wound for 4 weeks duration.  The wound is located on  the right leg .  The patient has complaints of scabs on his lower extremities.   The patient has been wearing compression stockings. The patient denies fevers or chills.  The patient has localized pain to the wound upon dressing changes.  The patient has no other complaints or associated symptoms. \par \par

## 2021-06-24 ENCOUNTER — APPOINTMENT (OUTPATIENT)
Dept: PULMONOLOGY | Facility: CLINIC | Age: 81
End: 2021-06-24
Payer: COMMERCIAL

## 2021-06-24 VITALS
SYSTOLIC BLOOD PRESSURE: 140 MMHG | DIASTOLIC BLOOD PRESSURE: 70 MMHG | OXYGEN SATURATION: 97 % | TEMPERATURE: 98 F | RESPIRATION RATE: 16 BRPM | HEART RATE: 71 BPM | BODY MASS INDEX: 38.3 KG/M2 | HEIGHT: 73 IN | WEIGHT: 289 LBS

## 2021-06-24 DIAGNOSIS — J98.4 EMPHYSEMA, UNSPECIFIED: ICD-10-CM

## 2021-06-24 DIAGNOSIS — J43.9 EMPHYSEMA, UNSPECIFIED: ICD-10-CM

## 2021-06-24 DIAGNOSIS — Z71.89 OTHER SPECIFIED COUNSELING: ICD-10-CM

## 2021-06-24 PROCEDURE — 94727 GAS DIL/WSHOT DETER LNG VOL: CPT

## 2021-06-24 PROCEDURE — 94010 BREATHING CAPACITY TEST: CPT

## 2021-06-24 PROCEDURE — 94729 DIFFUSING CAPACITY: CPT

## 2021-06-24 PROCEDURE — 95012 NITRIC OXIDE EXP GAS DETER: CPT

## 2021-06-24 PROCEDURE — 99214 OFFICE O/P EST MOD 30 MIN: CPT | Mod: 25

## 2021-06-24 PROCEDURE — 96401 CHEMO ANTI-NEOPL SQ/IM: CPT

## 2021-06-24 PROCEDURE — 94618 PULMONARY STRESS TESTING: CPT

## 2021-06-24 PROCEDURE — ZZZZZ: CPT

## 2021-06-24 RX ORDER — OMALIZUMAB 75 MG/.5ML
75 INJECTION, SOLUTION SUBCUTANEOUS
Qty: 0 | Refills: 0 | Status: COMPLETED | OUTPATIENT
Start: 2021-06-24

## 2021-06-24 RX ORDER — OMALIZUMAB 150 MG/ML
150 INJECTION, SOLUTION SUBCUTANEOUS
Qty: 0 | Refills: 0 | Status: COMPLETED | OUTPATIENT
Start: 2021-06-24

## 2021-06-24 RX ADMIN — OMALIZUMAB 0 MG/ML: 150 INJECTION, SOLUTION SUBCUTANEOUS at 00:00

## 2021-06-24 RX ADMIN — OMALIZUMAB 0 MG/0.5ML: 75 INJECTION, SOLUTION SUBCUTANEOUS at 00:00

## 2021-06-24 NOTE — ADDENDUM
[FreeTextEntry1] : Documented by Neil Solitario acting as a scribe for Dr. Jason Zeng on 06/24/2021.\par \par All medical record entries made by the Scribe were at my, Dr. Jason Zeng's, direction and personally dictated by me on 06/24/2021 . I have reviewed the chart and agree that the record accurately reflects my personal performance of the history, physical exam, assessment and plan. I have also personally directed, reviewed, and agree with the discharge instructions. \par

## 2021-06-24 NOTE — ASSESSMENT
[FreeTextEntry1] : Mr. Jimenez is an 80 year old male with a history of asthma, allergies, chronic mucopurulent bronchitis, GERD, OSAS, HTN, HLD, hypothyroidism, bladder/prostate issue; anxiety/depression, non-smoker, now coming back for pulmonary re-evaluation. He is presenting today with SOB, weight issues.  #1 lymphedema \par \par His shortness of breath is felt to be multifactorial due to:\par -severe persistent asthma, controlled \par -poor breathing mechanics\par -emphysema   ? NM DZ-restrictive dz\par -overweight / out of shape\par -? CAD (CHF)\par - ?iron deficiency \par -? 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 Hutchins \par - blood work check: BNP  q 3-4 months\par \par Problem 1B: ?PE (+) D-dimer- CTPA (negative) \par IF Blood clots are noted w/in your lungs diagnosed by either abnormal CTPA or ventilation perfusion scan. You will need a hypercoagulable work up done by a hematologist to attempt to identify the etiology of this problem. Anticoagulation will be needed for at least 6 months- drugs included are Coumadin, lovenox, arixtra, or another agent. Repeat CTPA or VQ scan will be needed in approximately 6-8 weeks. An echocardiogram may be needed to assess for right ventricular function and pulmonary hypertension. The importance of hydration, ambulation, and regular/consistent diet are extremely important. \par \par Problem 1c: ?iron deficiency\par -s/p blood work: iron studies (WNL) \par \par Problem 1D: ? IVA Fatigue/ Dysfunction\par -complete Neurologic evaluation \par \par problem 1: severe persistent asthma / COPD / emphysema (stable)\par -continue DuoNeb by the nebulizer TID to QiD\par -continue Symbicort 160 2 inhalations BID then Qvar 80 at 2 puffs BID\par -continue Incruse 1 inhalation QD \par -continue Singulair 10 mg QHS \par -continue Daliresp 500 mg QD\par -Asthma is believed to be caused by inherited (genetic) and environmental factor, but its exact cause is unknown. Asthma may be triggered by allergens, lung infections, or irritants in the air. Asthma triggers are different for each person\par -Inhaler technique reviewed as well as oral hygiene techniques reviewed with patient. Avoidance of cold air, extremes of temperature, rescue inhaler should be used before exercise. Order of medication reviewed with patient. Recommended use of a cool mist humidifier in the bedroom. \par \par problem 2: poor breathing mechanics / poor balance\par -recommended to get balance therapy\par -Proper breathing techniques were reviewed with an emphasis of exhalation. Patient instructed to breath in for 1 second and out for four seconds. Patient was encouraged to not talk while walking. \par \par problem 3: abnormal chest CT/ r/o TBM (50%)\par -c/w mucus plugging\par -recommended to add acapella device to be done multiple times daily \par -follow up chest CT (last 12/2020, next 12/2021) \par CAT scans are the only radiological modality to identify abnormalities w/in the lungs with regards to nodules/masses/lymph nodes. Risks, benefits were reviewed in detail. The guidelines for abnormalities include follow up CT scans at various intervals which could range from 6 weeks to 1 year intervals. If there is a change for the worse then consideration for a biopsy will be considered if you are a candidate. Second opinion evaluation with a thoracic surgeon or an interventional radiologist could be offered. \par \par Problem 3A: Chronic mucopurulent bronchitis\par -chest vest therapy in progress\par -patient tried and failed acapella device therapy\par Patient has a chronic cough greater than 6 months, tried and failed manual chest physiotherapy at home, no skilled caregiver available at home to perform manual CPT, tried and failed acapella vibratory physiotherapy, and recommended chest vest therapy \par \par Problem 3B: Cirrhosis - ?portopulmonary HTN\par -recommended hepatologist evaluation (Jose) \par \par problem 4: allergic rhinitis\par -recommended to try Navage nasal system \par -continue Astelin 0.15% 1 inhalation each nostril BID \par -continue Clarinex 5 mg QHS \par -continue Qnasl 1 sniff/nostril BID\par -Recommending Zaditor 1 eye drop BID\par -Environmental measures for allergies were encouraged including mattress and pillow cover, air purifier, and environmental controls.\par \par problem 5: r/o ABPA \par -Based on blood work (not present)\par \par problem 6: obesity \par -recommended to read 'Bright Spots and Landmines' by author Toi Gillette and 'Obesity Code' by Jason Reis \par -Weight loss, exercise, and diet control were discussed and are highly encouraged. Treatment options were given such as, aqua therapy, and contacting a nutritionist. Recommended to use the elliptical, stationary bike, less use of treadmill. Obesity is associated with worsening asthma, shortness of breath, and potential for cardiac disease, diabetes, and other underlying medical conditions.\par \par problem 7: OSAS (stable now)\par -continue CPAP (increased pressure necessary)- compliant\par -Sleep apnea is associated with adverse clinical consequences which an affect most organ systems. Cardiovascular disease risk includes arrhythmias, atrial fibrillation, hypertension, coronary artery disease, and stroke. Metabolic disorders include diabetes type 2, non-alcoholic fatty liver disease. Mood disorder especially depression; and cognitive decline especially in the elderly. Associations with chronic reflux/Mancera’s esophagus some but not all inclusive. \par -Reasons to assess this include arousal consistent with hypopnea; respiratory events most prominent in REM sleep or supine position; therefore sleep staging and body position are important for accurate diagnosis and estimation of AHI. \par \par Problem 8: elevated IgE level \par -Received Xolair today\par -Xolair is a recombinant DNA- derived humanized IgG1K monoclonal antibody that selectively binds to human immunoglobulin E (IgE). Xolair is produced by a Chinese hamster ovary cell suspension culture in nutrient medium containing the antibiotic gentamicin. Gentamicin is not detectable in the final product. Xolair is a sterile, white, preservative free, lyophilized powder contained in a single use vial that is reconstituted with sterile water for suspension. Side effects include: wheezing, tightness of the chest, trouble breathing, hives, skin rash, feeling anxious or light-headed, fainting, warmth or tingling under skin, or swelling of face, lips, or tongue \par \par problem 9: Health Maintenance/COVID19 Precautions:\par -s/p Pfizer COVID 19 Vaccine x2\par - Clean your hands often. Wash your hands often with soap and water for at least 20 seconds, especially after blowing your nose, coughing, or sneezing, or having been in a public place.\par - If soap and water are not available, use a hand  that contains at least 60% alcohol.\par - To the extent possible, avoid touching high-touch surfaces in public places - elevator buttons, door handles, handrails, handshaking with people, etc. Use a tissue or your sleeve to cover your hand or finger if you must touch something.\par - Wash your hands after touching surfaces in public places.\par - Avoid touching your face, nose, eyes, etc.\par - Clean and disinfect your home to remove germs: practice routine cleaning of frequently touched surfaces (for example: tables, doorknobs, light switches, handles, desks, toilets, faucets, sinks & cell phones)\par - Avoid crowds, especially in poorly ventilated spaces. Your risk of exposure to respiratory viruses like COVID-19 may increase in crowded, closed-in settings with little air circulation if there are people in the crowd who are sick. All patients are recommended to practice social distancing and stay at least 6 feet away from others.\par - Avoid all non-essential travel including plane trips, and especially avoid embarking on cruise ships.\par -If COVID-19 is spreading in your community, take extra measures to put distance between yourself and other people to further reduce your risk of being exposed to this new virus.\par -Stay home as much as possible.\par - Consider ways of getting food brought to your house through family, social, or commercial networks\par -Be aware that the virus has been known to live in the air up to 3 hours post exposure, cardboard up to 24 hours post exposure, copper up to 4 hours post exposure, steel and plastic up to 2-3 days post exposure. Risk of transmission from these surfaces are affected by many variables.\par Immune Support Recommendations:\par -OTC Vitamin C 500mg BID \par -OTC Quercetin 250-500mg BID \par -OTC Zinc 75-100mg per day \par -OTC Melatonin 1 or 2 mg a night \par -OTC Vitamin D 1-4000mg per day \par -OTC Tonic Water 8oz per day\par Asthma and COVID19:\par You need to make sure your asthma is under control. This often requires the use of inhaled corticosteroids (and sometimes oral corticosteroids). Inhaled corticosteroids do not likely reduce your immune system’s ability to fight infections, but oral corticosteroids may. It is important to use the steps above to protect yourself to limit your exposure to any respiratory virus. \par \par Problem 10: health maintenance \par -recommended pulmonary rehabilitation ([facility]) \par -s/p influenza vaccination 2020\par -recommended strep pneumonia vaccines: Prevnar-13 vaccine, followed by Pneumo vaccine 23 one year following (completed)\par -recommended early intervention for URIs\par -recommended regular osteoporosis evaluations\par -recommended early dermatological evaluations\par -recommended after the age of 50 to the age of 70, colonoscopy every 5 years \par  \par F/U in 3-4 weeks \par He is encouraged to call with any changes, concerns, or questions.

## 2021-06-24 NOTE — HISTORY OF PRESENT ILLNESS
[FreeTextEntry1] : Mr. Jimenez is a 80 year old male presenting to the office for a follow up visit for abnormal chest CT, elevated IgE level, severe asthma, KHADRA, postnasal drip, poor balance, snoring and shortness of breath. His chief complaint is \par \par -he notes sinuses generally improved\par -he notes increased mucus production has improved with nasal spray compliance\par -he notes SOB stable\par -he notes weight decreased intentionally, looking to do more\par -he notes regular bowel movements \par -he notes dental issues with implants needed\par -he denies regular exercise\par -he notes eating well\par -he notes chronic leg swelling\par -he notes intermittent SOB on stairs, exertion\par -he denies wheeze\par -he notes balance stable\par \par -denies any chest pain, chest pressure, diarrhea, constipation, dysphagia, sour taste in the mouth, dizziness, leg pain, myalgias, arthralgias, itchy eyes, itchy ears, heartburn, or reflux.\par \par

## 2021-06-24 NOTE — PROCEDURE
[FreeTextEntry1] : \par PFT revealed moderate restrictive and severe obstructive dysfunction , with a FEV1 of 1.48 L, which is 43% of predicted, low normal lung volumes, and a low normal diffusion of 19.2 , which is 73% of predicted, and with a normal flow volume loop. reduced muscle strength severely depressed: MIP 37% and MEP 39%\par \par 6 minute walk test reveals a low saturation of 93% with slight evidence of dyspnea or fatigue; walked 48.9   meters\par \par FENO was 21; a normal value being less than 25\par Fractional exhaled nitric oxide (FENO) is regarded as a simple, noninvasive method for assessing eosinophilic airway inflammation. Produced by a variety of cells within the lung, nitric oxide (NO) concentrations are generally low in healthy individuals. However, high concentrations of NO appear to be involved in nonspecific host defense mechanisms and chronic inflammatory diseases such as asthma. The American Thoracic Society (ATS) therefore has recommended using FENO to aid in the diagnosis and monitoring of eosinophilic airway inflammation and asthma, and for identifying steroid responsive individuals whose chronic respiratory symptoms may be caused by airway inflammation.

## 2021-06-28 ENCOUNTER — APPOINTMENT (OUTPATIENT)
Dept: WOUND CARE | Facility: CLINIC | Age: 81
End: 2021-06-28
Payer: COMMERCIAL

## 2021-06-28 VITALS
RESPIRATION RATE: 16 BRPM | SYSTOLIC BLOOD PRESSURE: 154 MMHG | DIASTOLIC BLOOD PRESSURE: 77 MMHG | HEART RATE: 87 BPM | TEMPERATURE: 97.1 F

## 2021-06-28 DIAGNOSIS — I89.0 LYMPHEDEMA, NOT ELSEWHERE CLASSIFIED: ICD-10-CM

## 2021-06-28 PROCEDURE — 93970 EXTREMITY STUDY: CPT

## 2021-06-28 PROCEDURE — 99213 OFFICE O/P EST LOW 20 MIN: CPT

## 2021-06-28 PROCEDURE — 93923 UPR/LXTR ART STDY 3+ LVLS: CPT

## 2021-06-29 PROBLEM — I89.0 CHRONIC ACQUIRED LYMPHEDEMA: Status: ACTIVE | Noted: 2021-06-16

## 2021-06-29 NOTE — PHYSICAL EXAM
[JVD] : no jugular venous distention  [Normal Thyroid] : the thyroid was normal [Normal Breath Sounds] : Normal breath sounds [Normal Heart Sounds] : normal heart sounds [2+] : left 2+ [Ankle Swelling (On Exam)] : present [Ankle Swelling Bilaterally] : bilaterally  [Ankle Swelling On The Left] : moderate [Abdomen Masses] : No abdominal massess [No HSM] : no hepatosplenomegaly [Tender] : was nontender [Skin Ulcer] : ulcer [Alert] : alert [Oriented to Person] : oriented to person [Oriented to Place] : oriented to place [Oriented to Time] : oriented to time [Calm] : calm [de-identified] : normal [de-identified] : wnl [Please See PDF for [employer]] : Please See PDF for [employer].

## 2021-06-29 NOTE — ASSESSMENT
[Verbal] : Verbal [Patient] : Patient [Good - alert, interested, motivated] : Good - alert, interested, motivated [Demonstrates independently] : demonstrates independently [Dressing changes] : dressing changes [Skin Care] : skin care [Nutrition] : nutrition [FreeTextEntry1] : The patient presents with a wound/scab to the right leg, chronic lymphedema   Swelling noted to the extremity.  \par ROSARIO/PVR results discussed\par No clinical sign of infection\par Excisionally debrided\par  apply Chelsea\par Compression stocking ordered\par No significant arterial or venous insufficiency\par Wound healed\par Continue compression therapy\par \par Follow up appointment scheduled for lymphedema pump\par chronic lymphedema\par

## 2021-07-06 RX ORDER — AZELASTINE HYDROCHLORIDE 205.5 UG/1
0.15 SPRAY, METERED NASAL
Qty: 3 | Refills: 1 | Status: ACTIVE | COMMUNITY
Start: 2018-10-09 | End: 1900-01-01

## 2021-07-08 ENCOUNTER — APPOINTMENT (OUTPATIENT)
Dept: PULMONOLOGY | Facility: CLINIC | Age: 81
End: 2021-07-08

## 2021-08-12 ENCOUNTER — RX RENEWAL (OUTPATIENT)
Age: 81
End: 2021-08-12

## 2021-08-12 RX ORDER — DESLORATADINE 5 MG/1
5 TABLET ORAL
Qty: 90 | Refills: 3 | Status: ACTIVE | COMMUNITY
Start: 2020-09-29 | End: 1900-01-01

## 2021-08-18 ENCOUNTER — APPOINTMENT (OUTPATIENT)
Dept: PULMONOLOGY | Facility: CLINIC | Age: 81
End: 2021-08-18
Payer: COMMERCIAL

## 2021-08-18 VITALS
HEIGHT: 69 IN | DIASTOLIC BLOOD PRESSURE: 68 MMHG | RESPIRATION RATE: 16 BRPM | BODY MASS INDEX: 40.29 KG/M2 | SYSTOLIC BLOOD PRESSURE: 130 MMHG | WEIGHT: 272 LBS | HEART RATE: 71 BPM | OXYGEN SATURATION: 96 % | TEMPERATURE: 97.6 F

## 2021-08-18 PROCEDURE — 96372 THER/PROPH/DIAG INJ SC/IM: CPT

## 2021-08-18 RX ORDER — OMALIZUMAB 75 MG/.5ML
75 INJECTION, SOLUTION SUBCUTANEOUS
Qty: 0 | Refills: 0 | Status: COMPLETED | OUTPATIENT
Start: 2021-08-18

## 2021-08-18 RX ORDER — OMALIZUMAB 150 MG/ML
150 INJECTION, SOLUTION SUBCUTANEOUS
Qty: 0 | Refills: 0 | Status: COMPLETED | OUTPATIENT
Start: 2021-08-18

## 2021-09-02 ENCOUNTER — APPOINTMENT (OUTPATIENT)
Dept: PULMONOLOGY | Facility: CLINIC | Age: 81
End: 2021-09-02
Payer: COMMERCIAL

## 2021-09-02 VITALS
BODY MASS INDEX: 39.84 KG/M2 | SYSTOLIC BLOOD PRESSURE: 140 MMHG | RESPIRATION RATE: 16 BRPM | WEIGHT: 269 LBS | HEIGHT: 69 IN | DIASTOLIC BLOOD PRESSURE: 60 MMHG | TEMPERATURE: 98.1 F | HEART RATE: 82 BPM | OXYGEN SATURATION: 95 %

## 2021-09-02 PROCEDURE — 96401 CHEMO ANTI-NEOPL SQ/IM: CPT

## 2021-09-02 RX ORDER — OMALIZUMAB 150 MG/ML
150 INJECTION, SOLUTION SUBCUTANEOUS
Qty: 0 | Refills: 0 | Status: COMPLETED | OUTPATIENT
Start: 2021-09-02

## 2021-09-02 RX ORDER — OMALIZUMAB 75 MG/.5ML
75 INJECTION, SOLUTION SUBCUTANEOUS
Qty: 0 | Refills: 0 | Status: COMPLETED | OUTPATIENT
Start: 2021-09-02

## 2021-09-21 ENCOUNTER — APPOINTMENT (OUTPATIENT)
Dept: PULMONOLOGY | Facility: CLINIC | Age: 81
End: 2021-09-21

## 2021-09-25 ENCOUNTER — APPOINTMENT (OUTPATIENT)
Dept: DISASTER EMERGENCY | Facility: CLINIC | Age: 81
End: 2021-09-25

## 2021-09-26 DIAGNOSIS — Z01.818 ENCOUNTER FOR OTHER PREPROCEDURAL EXAMINATION: ICD-10-CM

## 2021-09-27 ENCOUNTER — APPOINTMENT (OUTPATIENT)
Dept: DISASTER EMERGENCY | Facility: CLINIC | Age: 81
End: 2021-09-27

## 2021-09-29 ENCOUNTER — APPOINTMENT (OUTPATIENT)
Dept: PULMONOLOGY | Facility: CLINIC | Age: 81
End: 2021-09-29

## 2021-10-02 ENCOUNTER — RX RENEWAL (OUTPATIENT)
Age: 81
End: 2021-10-02

## 2021-10-08 ENCOUNTER — RX RENEWAL (OUTPATIENT)
Age: 81
End: 2021-10-08

## 2021-10-11 RX ORDER — UMECLIDINIUM 62.5 UG/1
62.5 AEROSOL, POWDER ORAL
Qty: 90 | Refills: 0 | Status: ACTIVE | COMMUNITY
Start: 2021-10-11 | End: 1900-01-01

## 2021-10-12 ENCOUNTER — APPOINTMENT (OUTPATIENT)
Dept: PULMONOLOGY | Facility: CLINIC | Age: 81
End: 2021-10-12
Payer: COMMERCIAL

## 2021-10-12 VITALS — WEIGHT: 271 LBS | BODY MASS INDEX: 35.92 KG/M2 | HEIGHT: 73 IN

## 2021-10-12 DIAGNOSIS — R09.89 OTHER SPECIFIED SYMPTOMS AND SIGNS INVOLVING THE CIRCULATORY AND RESPIRATORY SYSTEMS: ICD-10-CM

## 2021-10-12 PROCEDURE — 99214 OFFICE O/P EST MOD 30 MIN: CPT | Mod: 95

## 2021-10-12 RX ORDER — CARBINOXAMINE MALEATE 6 MG/1
6 TABLET ORAL
Qty: 90 | Refills: 1 | Status: ACTIVE | COMMUNITY
Start: 2021-10-12 | End: 1900-01-01

## 2021-10-12 RX ORDER — BECLOMETHASONE DIPROPIONATE 80 UG/1
80 AEROSOL, METERED NASAL
Qty: 3 | Refills: 1 | Status: ACTIVE | COMMUNITY
Start: 2017-08-31 | End: 1900-01-01

## 2021-10-12 NOTE — ASSESSMENT
[FreeTextEntry1] : Mr. Jimenez is an 81 year old male with a history of asthma, allergies, chronic mucopurulent bronchitis, GERD, OSAS, HTN, HLD, hypothyroidism, bladder/prostate issue; anxiety/depression, non-smoker, now coming back for pulmonary re-evaluation. He is presenting today via video call with SOB, weight issues.  #1 lymphedema \par \par His shortness of breath is felt to be multifactorial due to:\par -severe persistent asthma, controlled \par -poor breathing mechanics\par -emphysema   ? NM DZ-restrictive dz\par -overweight / out of shape\par -? CAD (CHF)\par - ?iron deficiency \par -? 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 Hutchins \par - blood work check: BNP  q 3-4 months\par \par Problem 1B: ?PE (+) D-dimer- CTPA (negative) \par IF Blood clots are noted w/in your lungs diagnosed by either abnormal CTPA or ventilation perfusion scan. You will need a hypercoagulable work up done by a hematologist to attempt to identify the etiology of this problem. Anticoagulation will be needed for at least 6 months- drugs included are Coumadin, lovenox, arixtra, or another agent. Repeat CTPA or VQ scan will be needed in approximately 6-8 weeks. An echocardiogram may be needed to assess for right ventricular function and pulmonary hypertension. The importance of hydration, ambulation, and regular/consistent diet are extremely important. \par \par Problem 1c: ?iron deficiency\par -s/p blood work: iron studies (WNL) \par \par Problem 1D: ? IVA Fatigue/ Dysfunction\par -complete Neurologic evaluation \par \par problem 1: severe persistent asthma / COPD / emphysema (stable)\par -continue DuoNeb by the nebulizer TID to QiD\par -continue Symbicort 160 2 inhalations BID then Qvar 80 at 2 puffs BID\par -continue Incruse 1 inhalation QD \par -continue Singulair 10 mg QHS \par -continue Daliresp 500 mg QD\par -Asthma is believed to be caused by inherited (genetic) and environmental factor, but its exact cause is unknown. Asthma may be triggered by allergens, lung infections, or irritants in the air. Asthma triggers are different for each person\par -Inhaler technique reviewed as well as oral hygiene techniques reviewed with patient. Avoidance of cold air, extremes of temperature, rescue inhaler should be used before exercise. Order of medication reviewed with patient. Recommended use of a cool mist humidifier in the bedroom. \par \par problem 2: poor breathing mechanics / poor balance\par -Recommended Wim Hof and Buteyko breathing techniques \par -recommended to get balance therapy\par -Proper breathing techniques were reviewed with an emphasis of exhalation. Patient instructed to breath in for 1 second and out for four seconds. Patient was encouraged to not talk while walking. \par \par problem 3: abnormal chest CT/ r/o TBM (50%)\par -c/w mucus plugging\par -recommended to add acapella device to be done multiple times daily \par -follow up chest CT (last 12/2020, next 12/2021) \par CAT scans are the only radiological modality to identify abnormalities w/in the lungs with regards to nodules/masses/lymph nodes. Risks, benefits were reviewed in detail. The guidelines for abnormalities include follow up CT scans at various intervals which could range from 6 weeks to 1 year intervals. If there is a change for the worse then consideration for a biopsy will be considered if you are a candidate. Second opinion evaluation with a thoracic surgeon or an interventional radiologist could be offered. \par \par Problem 3A: Chronic mucopurulent bronchitis\par -chest vest therapy in progress\par -patient tried and failed acapella device therapy\par Patient has a chronic cough greater than 6 months, tried and failed manual chest physiotherapy at home, no skilled caregiver available at home to perform manual CPT, tried and failed acapella vibratory physiotherapy, and recommended chest vest therapy \par \par Problem 3B: Cirrhosis - ?portopulmonary HTN\par -recommended hepatologist evaluation (Jose) \par \par problem 4: allergic rhinitis\par -Add Ryvent 6mg QDay \par -recommended to try Navage nasal system \par -continue Astelin 0.15% 1 inhalation each nostril BID \par -continue Clarinex 5 mg QHS \par -continue Qnasl 1 sniff/nostril BID\par -Recommending Zaditor 1 eye drop BID\par -Environmental measures for allergies were encouraged including mattress and pillow cover, air purifier, and environmental controls.\par \par problem 5: r/o ABPA \par -Based on blood work (not present)\par \par problem 6: obesity \par -Recommend "Muniq" OTC Supplement \par -recommended to read 'Bright Spots and Landmines' by author Toi Gillette and 'Obesity Code' by Jason Reis \par -Weight loss, exercise, and diet control were discussed and are highly encouraged. Treatment options were given such as, aqua therapy, and contacting a nutritionist. Recommended to use the elliptical, stationary bike, less use of treadmill. Obesity is associated with worsening asthma, shortness of breath, and potential for cardiac disease, diabetes, and other underlying medical conditions.\par \par problem 7: OSAS (stable now)\par -continue CPAP (increased pressure necessary)- compliant\par -Sleep apnea is associated with adverse clinical consequences which an affect most organ systems. Cardiovascular disease risk includes arrhythmias, atrial fibrillation, hypertension, coronary artery disease, and stroke. Metabolic disorders include diabetes type 2, non-alcoholic fatty liver disease. Mood disorder especially depression; and cognitive decline especially in the elderly. Associations with chronic reflux/Mancera’s esophagus some but not all inclusive. \par -Reasons to assess this include arousal consistent with hypopnea; respiratory events most prominent in REM sleep or supine position; therefore sleep staging and body position are important for accurate diagnosis and estimation of AHI. \par \par Problem 8: elevated IgE level \par -Received Xolair today\par -Xolair is a recombinant DNA- derived humanized IgG1K monoclonal antibody that selectively binds to human immunoglobulin E (IgE). Xolair is produced by a Chinese hamster ovary cell suspension culture in nutrient medium containing the antibiotic gentamicin. Gentamicin is not detectable in the final product. Xolair is a sterile, white, preservative free, lyophilized powder contained in a single use vial that is reconstituted with sterile water for suspension. Side effects include: wheezing, tightness of the chest, trouble breathing, hives, skin rash, feeling anxious or light-headed, fainting, warmth or tingling under skin, or swelling of face, lips, or tongue \par \par problem 9: Health Maintenance/COVID19 Precautions:\par -s/p Pfizer COVID 19 Vaccine x2\par - Clean your hands often. Wash your hands often with soap and water for at least 20 seconds, especially after blowing your nose, coughing, or sneezing, or having been in a public place.\par - If soap and water are not available, use a hand  that contains at least 60% alcohol.\par - To the extent possible, avoid touching high-touch surfaces in public places - elevator buttons, door handles, handrails, handshaking with people, etc. Use a tissue or your sleeve to cover your hand or finger if you must touch something.\par - Wash your hands after touching surfaces in public places.\par - Avoid touching your face, nose, eyes, etc.\par - Clean and disinfect your home to remove germs: practice routine cleaning of frequently touched surfaces (for example: tables, doorknobs, light switches, handles, desks, toilets, faucets, sinks & cell phones)\par - Avoid crowds, especially in poorly ventilated spaces. Your risk of exposure to respiratory viruses like COVID-19 may increase in crowded, closed-in settings with little air circulation if there are people in the crowd who are sick. All patients are recommended to practice social distancing and stay at least 6 feet away from others.\par - Avoid all non-essential travel including plane trips, and especially avoid embarking on cruise ships.\par -If COVID-19 is spreading in your community, take extra measures to put distance between yourself and other people to further reduce your risk of being exposed to this new virus.\par -Stay home as much as possible.\par - Consider ways of getting food brought to your house through family, social, or commercial networks\par -Be aware that the virus has been known to live in the air up to 3 hours post exposure, cardboard up to 24 hours post exposure, copper up to 4 hours post exposure, steel and plastic up to 2-3 days post exposure. Risk of transmission from these surfaces are affected by many variables.\par Immune Support Recommendations:\par -OTC Vitamin C 500mg BID \par -OTC Quercetin 250-500mg BID \par -OTC Zinc 75-100mg per day \par -OTC Melatonin 1 or 2 mg a night \par -OTC Vitamin D 1-4000mg per day \par -OTC Tonic Water 8oz per day\par Asthma and COVID19:\par You need to make sure your asthma is under control. This often requires the use of inhaled corticosteroids (and sometimes oral corticosteroids). Inhaled corticosteroids do not likely reduce your immune system’s ability to fight infections, but oral corticosteroids may. It is important to use the steps above to protect yourself to limit your exposure to any respiratory virus. \par \par Problem 10: health maintenance \par -recommended pulmonary rehabilitation ([facility]) \par -s/p influenza vaccination 2020\par -recommended strep pneumonia vaccines: Prevnar-13 vaccine, followed by Pneumo vaccine 23 one year following (completed)\par -recommended early intervention for URIs\par -recommended regular osteoporosis evaluations\par -recommended early dermatological evaluations\par -recommended after the age of 50 to the age of 70, colonoscopy every 5 years \par  \par F/U in 3-4 weeks \par He is encouraged to call with any changes, concerns, or questions.

## 2021-10-12 NOTE — HISTORY OF PRESENT ILLNESS
[Home] : at home, [unfilled] , at the time of the visit. [Medical Office: ([facility])___] : at the medical office located in  [Verbal consent obtained from patient] : the patient, [unfilled] [FreeTextEntry1] : Mr. Jimenez is a 81 year old male presenting to the office via video call for a follow up visit for abnormal chest CT, elevated IgE level, severe asthma, KHADRA, postnasal drip, poor balance, snoring and shortness of breath. His chief complaint is \par -he notes some diarrhea\par -he notes his sleep is pretty good\par -he notes getting 4 hours of solid sleep, then uses the bathroom, then goes back to sleep\par -he denies walking for exercise much\par -he notes he still has a phlegm cough\par -he notes his bowels are regular\par -he notes the phlegm is from the top down\par -no new medications, vitamins, or supplements \par -he notes taking Qnasl and another nasal spray\par -he notes the sprays are making a difference with the phlegm\par -he notes his weight is stable\par \par patient denies any headaches, nausea, vomiting, fever, chills, sweats, chest pain, chest pressure, palpitations, wheezing, fatigue, diarrhea, constipation, dysphagia, myalgias, dizziness, leg swelling, leg pain, itchy eyes, itchy ears, heartburn, reflux or sour taste in the mouth

## 2021-10-12 NOTE — ADDENDUM
[FreeTextEntry1] : Documented by Curtis Flor acting as a scribe for Dr. Jason Zeng on (10/12/2021).\par \par All medical record entries made by the Scribe were at my, Dr. Jason Zeng's, direction and personally dictated by me on (10/12/2021). I have reviewed the chart and agree that the record accurately reflects my personal performance of the history, physical exam, assessment and plan. I have also personally directed, reviewed, and agree with the discharge instructions.\par  \par

## 2021-10-12 NOTE — REASON FOR VISIT
[Follow-Up] : a follow-up visit [FreeTextEntry1] : video call - abnormal chest CT, elevated IgE level, severe asthma, KHADRA, postnasal drip, poor balance, snoring and shortness of breath

## 2021-10-25 ENCOUNTER — APPOINTMENT (OUTPATIENT)
Dept: DISASTER EMERGENCY | Facility: CLINIC | Age: 81
End: 2021-10-25

## 2021-10-25 LAB — SARS-COV-2 N GENE NPH QL NAA+PROBE: NOT DETECTED

## 2021-10-28 ENCOUNTER — APPOINTMENT (OUTPATIENT)
Dept: PULMONOLOGY | Facility: CLINIC | Age: 81
End: 2021-10-28

## 2021-10-29 ENCOUNTER — APPOINTMENT (OUTPATIENT)
Dept: PULMONOLOGY | Facility: CLINIC | Age: 81
End: 2021-10-29

## 2022-01-07 ENCOUNTER — RX RENEWAL (OUTPATIENT)
Age: 82
End: 2022-01-07

## 2022-01-07 RX ORDER — MONTELUKAST 10 MG/1
10 TABLET, FILM COATED ORAL
Qty: 90 | Refills: 0 | Status: ACTIVE | COMMUNITY
Start: 2018-02-02 | End: 1900-01-01

## 2022-01-13 ENCOUNTER — RX RENEWAL (OUTPATIENT)
Age: 82
End: 2022-01-13

## 2022-01-13 RX ORDER — BUDESONIDE AND FORMOTEROL FUMARATE DIHYDRATE 160; 4.5 UG/1; UG/1
160-4.5 AEROSOL RESPIRATORY (INHALATION) TWICE DAILY
Qty: 30.6 | Refills: 3 | Status: ACTIVE | COMMUNITY
Start: 2018-05-29 | End: 1900-01-01

## 2022-01-25 ENCOUNTER — APPOINTMENT (OUTPATIENT)
Dept: PULMONOLOGY | Facility: CLINIC | Age: 82
End: 2022-01-25
Payer: COMMERCIAL

## 2022-01-25 VITALS
BODY MASS INDEX: 34.72 KG/M2 | OXYGEN SATURATION: 93 % | TEMPERATURE: 98 F | DIASTOLIC BLOOD PRESSURE: 76 MMHG | WEIGHT: 262 LBS | HEIGHT: 73 IN | HEART RATE: 88 BPM | RESPIRATION RATE: 16 BRPM | SYSTOLIC BLOOD PRESSURE: 160 MMHG

## 2022-01-25 DIAGNOSIS — R09.82 POSTNASAL DRIP: ICD-10-CM

## 2022-01-25 DIAGNOSIS — R91.8 OTHER NONSPECIFIC ABNORMAL FINDING OF LUNG FIELD: ICD-10-CM

## 2022-01-25 DIAGNOSIS — R06.00 DYSPNEA, UNSPECIFIED: ICD-10-CM

## 2022-01-25 PROCEDURE — 99214 OFFICE O/P EST MOD 30 MIN: CPT

## 2022-01-25 NOTE — HISTORY OF PRESENT ILLNESS
[TextBox_4] : Mr. Jimenez is a 81 year old male presenting to the office for a follow up visit for abnormal chest CT, elevated IgE level, severe asthma, KHADRA, postnasal drip, poor balance, snoring and shortness of breath.\par \par He reports he is in his normal/baseline health. \par He was getting xolair Q 2 weeks for a very long time. \par Due to transportation issues and insurance issues- he has not been on Xolair since October of 2021. \par He wonders if he should get back on. Since being off of it for months- he notices no real difference/no decline or negative impact. \par He has baseline chronic SOB with exertion. \par He is compliant on his pulm regimen. \par He never heard anything about a chest vest for his bronchiectasis. He has a chronic type cough related to mucus clearing. \par He needs refills on his meds.\par He states the ryvent was too expensive. His PND is much more under control with use of azelastine 2 sprays each nostril BID. \par He is compliant and benefitting from his PAP device. \par No new complaints. \par He has not gone for follow up CT chest yet. \par

## 2022-01-25 NOTE — PHYSICAL EXAM
[General Appearance - Well Developed] : well developed [Normal Appearance] : normal appearance [Well Groomed] : well groomed [General Appearance - Well Nourished] : well nourished [No Deformities] : no deformities [General Appearance - In No Acute Distress] : no acute distress [Normal Conjunctiva] : the conjunctiva exhibited no abnormalities [Eyelids - No Xanthelasma] : the eyelids demonstrated no xanthelasmas [Normal Oropharynx] : normal oropharynx [III] : III [Neck Appearance] : the appearance of the neck was normal [Neck Cervical Mass (___cm)] : no neck mass was observed [Jugular Venous Distention Increased] : there was no jugular-venous distention [Heart Rate And Rhythm] : heart rate and rhythm were normal [Heart Sounds] : normal S1 and S2 [Murmurs] : no murmurs present [Respiration, Rhythm And Depth] : normal respiratory rhythm and effort [Exaggerated Use Of Accessory Muscles For Inspiration] : no accessory muscle use [Auscultation Breath Sounds / Voice Sounds] : lungs were clear to auscultation bilaterally [Abdomen Soft] : soft [Gait - Sufficient For Exercise Testing] : the gait was sufficient for exercise testing [Nail Clubbing] : no clubbing of the fingernails [Cyanosis, Localized] : no localized cyanosis [Petechial Hemorrhages (___cm)] : no petechial hemorrhages [FreeTextEntry1] : chronic venostasis changes.  [FreeTextEntry2] : 1+ edema lower extremity [Skin Color & Pigmentation] : normal skin color and pigmentation [] : no rash [No Venous Stasis] : no venous stasis [Skin Lesions] : no skin lesions [No Skin Ulcers] : no skin ulcer [No Xanthoma] : no  xanthoma was observed [Deep Tendon Reflexes (DTR)] : deep tendon reflexes were 2+ and symmetric [Sensation] : the sensory exam was normal to light touch and pinprick [No Focal Deficits] : no focal deficits [Oriented To Time, Place, And Person] : oriented to person, place, and time [Impaired Insight] : insight and judgment were intact [Affect] : the affect was normal

## 2022-01-25 NOTE — REVIEW OF SYSTEMS
[Cough] : cough [Dyspnea] : dyspnea [SOB on Exertion] : sob on exertion [Obesity] : obesity [Negative] : Psychiatric [TextBox_14] : PER HPI [TextBox_30] : baseline/chronic symptoms

## 2022-01-25 NOTE — ASSESSMENT
[FreeTextEntry1] : Mr. Jimenez is a 81 year old male presenting to the office for a follow up visit for abnormal chest CT, elevated IgE level, severe asthma, KHADRA, postnasal drip, poor balance, snoring and shortness of breath. He is stable from a pulmonary standpoint. \par Due to being off xolair, there has been no significant change or worsening of his asthma symptoms. \par The plan for the patient is as follows:\par \par #1. Severe persistent Asthma with COPD/emphysema\par -continue DuoNeb by the nebulizer BID upto QID\par -continue Symbicort 160 2 inhalations BID rinse and gargle\par - Qvar 80 at 2 puffs BID rinse and gargle\par -continue Incruse 1 inhalation QD \par -continue Singulair 10 mg QHS \par -continue Daliresp 500 mg QD\par -failed Xolair, discontinue\par -add Tezspire injections monthly (discussed with Dr. Zeng and in agreement): Tezspire is a first-in-class biologic for severe asthma that acts at the top of the inflammatory cascade by targeting thymic stromal lymphopoietin (TSLP), an epithelial cytokine.3 It is the first and only biologic to consistently and significantly reduce asthma exacerbations across Phase 2 and 3 clinical trials, which included a broad population of severe asthma patients irrespective of key biomarkers, including blood eosinophil counts, allergic status and fractional exhaled nitric oxide (FeNO).2,3 Tezspire is the first and only biologic for severe asthma that does not have a phenotype—eosinophilic or allergic—or biomarker limitation within its approved label.\par \par #2.Abnormal CT chest\par -c/w focal bronchiectasis and mucus plugging \par -tried and failed acapella\par -follow up CT due now\par \par #3.Bronchiectasis and chronic mucopurulent bronchitis\par -tried and failed acapella\par -Pt has a chronic cough > 6 months, tried and failed manual chest PT at home, no skilled caregiver available at home to perform manual CPT- now pt to move forward with chest vest. \par \par #4.allergic rhinitis and post nasal drip\par -continue Astelin 0.15% 1 inhalation each nostril BID \par -continue Clarinex 5 mg QHS \par -continue Qnasl 1 sniff/nostril BID\par \par #5.KHADRA on PAP therapy\par -compliant and benefitting from nightly use- pt to continue\par \par Pt to follow up in 3 months with Dr. Zeng\par we will call the patient re: authorization process for tezspire. \par Advised to call with any questions or concerns.

## 2022-03-09 RX ORDER — TEZEPELUMAB-EKKO 210 MG/1.9ML
210 INJECTION, SOLUTION SUBCUTANEOUS
Qty: 1 | Refills: 11 | Status: ACTIVE | COMMUNITY
Start: 2022-03-09 | End: 1900-01-01

## 2022-04-26 ENCOUNTER — NON-APPOINTMENT (OUTPATIENT)
Age: 82
End: 2022-04-26

## 2022-04-26 ENCOUNTER — APPOINTMENT (OUTPATIENT)
Dept: PULMONOLOGY | Facility: CLINIC | Age: 82
End: 2022-04-26
Payer: COMMERCIAL

## 2022-04-26 VITALS
SYSTOLIC BLOOD PRESSURE: 150 MMHG | DIASTOLIC BLOOD PRESSURE: 74 MMHG | WEIGHT: 251 LBS | HEIGHT: 73 IN | BODY MASS INDEX: 33.27 KG/M2 | OXYGEN SATURATION: 93 % | HEART RATE: 89 BPM | RESPIRATION RATE: 16 BRPM | TEMPERATURE: 97.8 F

## 2022-04-26 PROCEDURE — 96372 THER/PROPH/DIAG INJ SC/IM: CPT

## 2022-04-26 PROCEDURE — 95012 NITRIC OXIDE EXP GAS DETER: CPT

## 2022-04-26 PROCEDURE — 94010 BREATHING CAPACITY TEST: CPT

## 2022-04-26 PROCEDURE — 99214 OFFICE O/P EST MOD 30 MIN: CPT | Mod: 25

## 2022-04-26 RX ORDER — TEZEPELUMAB-EKKO 210 MG/1.9ML
210 INJECTION, SOLUTION SUBCUTANEOUS
Qty: 0 | Refills: 0 | Status: COMPLETED | OUTPATIENT
Start: 2022-04-26

## 2022-04-26 NOTE — PHYSICAL EXAM
[No Acute Distress] : no acute distress [Normal Oropharynx] : normal oropharynx [III] : Mallampati Class: III [Normal Appearance] : normal appearance [No Neck Mass] : no neck mass [Normal Rate/Rhythm] : normal rate/rhythm [Normal S1, S2] : normal s1, s2 [No Murmurs] : no murmurs [No Resp Distress] : no resp distress [Clear to Auscultation Bilaterally] : clear to auscultation bilaterally [No Abnormalities] : no abnormalities [Kyphosis] : kyphosis [Benign] : benign [Normal Gait] : normal gait [No Clubbing] : no clubbing [No Cyanosis] : no cyanosis [FROM] : FROM [Normal Color/ Pigmentation] : normal color/ pigmentation [No Focal Deficits] : no focal deficits [Oriented x3] : oriented x3 [Normal Affect] : normal affect [TextBox_2] : OW [TextBox_68] : I:E 1:3, clear  [TextBox_105] : 1+ LE edema

## 2022-04-26 NOTE — ADDENDUM
[FreeTextEntry1] : Documented by Curtis Flor acting as a scribe for Dr. Jason Zeng on 04/26/2022\par \par All medical record entries made by the scribe were at my, Dr. Jason Zeng's, direction and personally dictated by me on 04/26/2022. I have reviewed the chart and agree that the record accurately reflects my personal performance of the history, physical exam, assessment, and plan. I have also personally directed, reviewed, and agree with the discharge instructions. \par

## 2022-04-26 NOTE — PROCEDURE
[FreeTextEntry1] : FENO was 12; a normal value being less than 25. Fractional exhaled nitric oxide (FENO) is regarded as a simple, noninvasive method for assessing eosinophilic airway inflammation. Produced by a variety of cells within the lung, nitric oxide (NO) concentrations are generally low in healthy individuals. However, high concentrations of NO appear to be involved in nonspecific host defense mechanisms and chronic inflammatory  diseases such as asthma. The American Thoracic Society (ATS) therefore recommended using FENO to aid in the diagnosis and monitoring of eosinophilic airway inflammation and asthma, and for identifying steroid responsive individuals whose chronic respiratory symptoms may be caused by airway inflammation\par \par PFT revealed moderate restrictive and severe obstructive dysfunction, with a FEV1 of 1.21L, which is 39% of predicted, with a normal flow volume loop  \par \par Refused 6 minute walk

## 2022-04-26 NOTE — ASSESSMENT
[FreeTextEntry1] : Mr. Jimenez is an 81 year old male with a history of asthma, allergies, chronic mucopurulent bronchitis, GERD, OSAS, HTN, HLD, hypothyroidism, bladder/prostate issue; anxiety/depression, non-smoker, now coming back for pulmonary re-evaluation. He is presenting today with SOB, weight issues.  #1 breathing issue\par \par His shortness of breath is felt to be multifactorial due to:\par -severe persistent asthma, controlled \par -poor breathing mechanics\par -emphysema   ? NM DZ-restrictive dz\par -overweight / out of shape\par -? CAD (CHF)\par - ?iron deficiency \par -? 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 Hutchins \par - blood work check: BNP  q 3-4 months\par \par Problem 1B: ?PE (+) D-dimer- CTPA (negative) \par IF Blood clots are noted w/in your lungs diagnosed by either abnormal CTPA or ventilation perfusion scan. You will need a hypercoagulable work up done by a hematologist to attempt to identify the etiology of this problem. Anticoagulation will be needed for at least 6 months- drugs included are Coumadin, lovenox, arixtra, or another agent. Repeat CTPA or VQ scan will be needed in approximately 6-8 weeks. An echocardiogram may be needed to assess for right ventricular function and pulmonary hypertension. The importance of hydration, ambulation, and regular/consistent diet are extremely important. \par \par Problem 1c: ?iron deficiency\par -s/p blood work: iron studies (WNL) \par \par Problem 1D: ? IVA Fatigue/ Dysfunction\par -complete Neurologic evaluation \par \par problem 1: severe persistent asthma / COPD / emphysema (stable)\par -continue DuoNeb by the nebulizer TID to QiD\par -continue Symbicort 160 2 inhalations BID then Qvar 80 at 2 puffs BID\par -continue Incruse 1 inhalation QD \par -continue Singulair 10 mg QHS \par -continue Daliresp 500 mg QD\par -Asthma is believed to be caused by inherited (genetic) and environmental factor, but its exact cause is unknown. Asthma may be triggered by allergens, lung infections, or irritants in the air. Asthma triggers are different for each person\par -Inhaler technique reviewed as well as oral hygiene techniques reviewed with patient. Avoidance of cold air, extremes of temperature, rescue inhaler should be used before exercise. Order of medication reviewed with patient. Recommended use of a cool mist humidifier in the bedroom. \par \par Problem 1A: Biologic Rx\par -Tezpire Trial (first shot administered in the office 4/26/2022)\par \par problem 2: poor breathing mechanics / poor balance\par -Recommended Wim Hof and Buteyko breathing techniques \par -recommended to get balance therapy\par -Proper breathing techniques were reviewed with an emphasis of exhalation. Patient instructed to breath in for 1 second and out for four seconds. Patient was encouraged to not talk while walking. \par \par problem 3: abnormal chest CT/ r/o TBM (50%)\par -c/w mucus plugging\par -recommended to add acapella device to be done multiple times daily \par -follow up chest CT (last 12/2020, next 12/2021) (repeat)\par CAT scans are the only radiological modality to identify abnormalities w/in the lungs with regards to nodules/masses/lymph nodes. Risks, benefits were reviewed in detail. The guidelines for abnormalities include follow up CT scans at various intervals which could range from 6 weeks to 1 year intervals. If there is a change for the worse then consideration for a biopsy will be considered if you are a candidate. Second opinion evaluation with a thoracic surgeon or an interventional radiologist could be offered. \par \par Problem 3A: Chronic mucopurulent bronchitis\par -chest vest therapy in progress\par -patient tried and failed acapella device therapy\par Patient has a chronic cough greater than 6 months, tried and failed manual chest physiotherapy at home, no skilled caregiver available at home to perform manual CPT, tried and failed acapella vibratory physiotherapy, and recommended chest vest therapy \par \par Problem 3B: Cirrhosis - ?portopulmonary HTN\par -recommended hepatologist evaluation (Jose) \par \par problem 4: allergic rhinitis\par -Add Ryvent 6mg QDay \par -recommended to try Navage nasal system \par -continue Astelin 0.15% 1 inhalation each nostril BID \par -continue Clarinex 5 mg QHS \par -continue Qnasl 1 sniff/nostril BID\par -Recommending Zaditor 1 eye drop BID\par -Environmental measures for allergies were encouraged including mattress and pillow cover, air purifier, and environmental controls.\par \par problem 5: r/o ABPA \par -Based on blood work (not present)\par \par problem 6: obesity \par -Recommend "Muniq" OTC Supplement \par -recommended to read 'Bright Spots and Landmines' by author Toi Gillette and 'Obesity Code' by Jason Reis \par -Weight loss, exercise, and diet control were discussed and are highly encouraged. Treatment options were given such as, aqua therapy, and contacting a nutritionist. Recommended to use the elliptical, stationary bike, less use of treadmill. Obesity is associated with worsening asthma, shortness of breath, and potential for cardiac disease, diabetes, and other underlying medical conditions.\par \par problem 7: OSAS (stable now)\par -continue CPAP (increased pressure necessary)- compliant\par -Sleep apnea is associated with adverse clinical consequences which an affect most organ systems. Cardiovascular disease risk includes arrhythmias, atrial fibrillation, hypertension, coronary artery disease, and stroke. Metabolic disorders include diabetes type 2, non-alcoholic fatty liver disease. Mood disorder especially depression; and cognitive decline especially in the elderly. Associations with chronic reflux/Mancera’s esophagus some but not all inclusive. \par -Reasons to assess this include arousal consistent with hypopnea; respiratory events most prominent in REM sleep or supine position; therefore sleep staging and body position are important for accurate diagnosis and estimation of AHI. \par \par Problem 8: elevated IgE level \par -Received Xolair last Spring 2022\par -Xolair is a recombinant DNA- derived humanized IgG1K monoclonal antibody that selectively binds to human immunoglobulin E (IgE). Xolair is produced by a Chinese hamster ovary cell suspension culture in nutrient medium containing the antibiotic gentamicin. Gentamicin is not detectable in the final product. Xolair is a sterile, white, preservative free, lyophilized powder contained in a single use vial that is reconstituted with sterile water for suspension. Side effects include: wheezing, tightness of the chest, trouble breathing, hives, skin rash, feeling anxious or light-headed, fainting, warmth or tingling under skin, or swelling of face, lips, or tongue \par \par problem 9: Health Maintenance/COVID19 Precautions:\par -s/p Pfizer COVID 19 Vaccine x2\par - Clean your hands often. Wash your hands often with soap and water for at least 20 seconds, especially after blowing your nose, coughing, or sneezing, or having been in a public place.\par - If soap and water are not available, use a hand  that contains at least 60% alcohol.\par - To the extent possible, avoid touching high-touch surfaces in public places - elevator buttons, door handles, handrails, handshaking with people, etc. Use a tissue or your sleeve to cover your hand or finger if you must touch something.\par - Wash your hands after touching surfaces in public places.\par - Avoid touching your face, nose, eyes, etc.\par - Clean and disinfect your home to remove germs: practice routine cleaning of frequently touched surfaces (for example: tables, doorknobs, light switches, handles, desks, toilets, faucets, sinks & cell phones)\par - Avoid crowds, especially in poorly ventilated spaces. Your risk of exposure to respiratory viruses like COVID-19 may increase in crowded, closed-in settings with little air circulation if there are people in the crowd who are sick. All patients are recommended to practice social distancing and stay at least 6 feet away from others.\par - Avoid all non-essential travel including plane trips, and especially avoid embarking on cruise ships.\par -If COVID-19 is spreading in your community, take extra measures to put distance between yourself and other people to further reduce your risk of being exposed to this new virus.\par -Stay home as much as possible.\par - Consider ways of getting food brought to your house through family, social, or commercial networks\par -Be aware that the virus has been known to live in the air up to 3 hours post exposure, cardboard up to 24 hours post exposure, copper up to 4 hours post exposure, steel and plastic up to 2-3 days post exposure. Risk of transmission from these surfaces are affected by many variables.\par Immune Support Recommendations:\par -OTC Vitamin C 500mg BID \par -OTC Quercetin 250-500mg BID \par -OTC Zinc 75-100mg per day \par -OTC Melatonin 1 or 2 mg a night \par -OTC Vitamin D 1-4000mg per day \par -OTC Tonic Water 8oz per day\par Asthma and COVID19:\par You need to make sure your asthma is under control. This often requires the use of inhaled corticosteroids (and sometimes oral corticosteroids). Inhaled corticosteroids do not likely reduce your immune system’s ability to fight infections, but oral corticosteroids may. It is important to use the steps above to protect yourself to limit your exposure to any respiratory virus. \par \par Problem 10: health maintenance \par -recommended pulmonary rehabilitation ([facility]) \par -s/p influenza vaccination 2020\par -recommended strep pneumonia vaccines: Prevnar-13 vaccine, followed by Pneumo vaccine 23 one year following (completed)\par -recommended early intervention for URIs\par -recommended regular osteoporosis evaluations\par -recommended early dermatological evaluations\par -recommended after the age of 50 to the age of 70, colonoscopy every 5 years \par  \par F/U in 3-4 weeks \par He is encouraged to call with any changes, concerns, or questions.

## 2022-04-26 NOTE — HISTORY OF PRESENT ILLNESS
[FreeTextEntry1] : Mr. Jimenez is a 81 year old male presenting to the office for a follow up visit for abnormal chest CT, elevated IgE level, severe asthma, KHADRA, postnasal drip, poor balance, snoring and shortness of breath. His chief complaint is \par -he notes his walking is terrible\par -he notes he lost weight recently after cutting back on snacking and he now eats two meals a day\par -he was thinking about going off Xolair because he didn't think it was working\par -he denies any hoarseness\par -he notes his bowels are regular\par -he notes his sense of smell and taste are normal\par -he notes he has been using the CPAP\par -he notes his energy level is 5/10\par -he notes he is either sitting or laying down in bed\par -he notes his breathing gets very labored when he walks\par -he notes he has some wounds that are not healing well\par -he notes taking two nasal sprays, Symbicort, inhalation therapy BID, Benadryl in the evening, Incruse in the morning\par -he notes his breathing is his main issue\par -he notes he cannot get around without the cane\par -he notes the last time he got Xolair was a few months ago\par \par -patient denies any headaches, nausea, vomiting, fever, chills, sweats, chest pain, chest pressure, palpitations, coughing, wheezing, fatigue, diarrhea, constipation, dysphagia, myalgias, dizziness, leg swelling, leg pain, itchy eyes, itchy ears, heartburn, reflux or sour taste in the mouth

## 2022-05-03 RX ORDER — TEZEPELUMAB-EKKO 210 MG/1.9ML
210 INJECTION, SOLUTION SUBCUTANEOUS
Qty: 1 | Refills: 11 | Status: ACTIVE | COMMUNITY
Start: 2022-05-03 | End: 1900-01-01

## 2022-05-16 ENCOUNTER — APPOINTMENT (OUTPATIENT)
Dept: WOUND CARE | Facility: CLINIC | Age: 82
End: 2022-05-16
Payer: COMMERCIAL

## 2022-05-16 VITALS
DIASTOLIC BLOOD PRESSURE: 75 MMHG | HEART RATE: 88 BPM | BODY MASS INDEX: 31.68 KG/M2 | HEIGHT: 73 IN | WEIGHT: 239 LBS | RESPIRATION RATE: 20 BRPM | SYSTOLIC BLOOD PRESSURE: 134 MMHG

## 2022-05-16 DIAGNOSIS — S81.801D UNSPECIFIED OPEN WOUND, RIGHT LOWER LEG, SUBSEQUENT ENCOUNTER: ICD-10-CM

## 2022-05-16 PROCEDURE — 11042 DBRDMT SUBQ TIS 1ST 20SQCM/<: CPT

## 2022-05-16 RX ORDER — UMECLIDINIUM 62.5 UG/1
62.5 AEROSOL, POWDER ORAL
Qty: 3 | Refills: 0 | Status: DISCONTINUED | COMMUNITY
Start: 2017-05-11 | End: 2022-05-16

## 2022-05-16 RX ORDER — FLUOROURACIL 50 MG/G
5 CREAM TOPICAL
Qty: 40 | Refills: 0 | Status: DISCONTINUED | COMMUNITY
Start: 2020-12-01 | End: 2022-05-16

## 2022-05-16 RX ORDER — SODIUM HYPOCHLORITE 1.25 MG/ML
0.12 SOLUTION TOPICAL
Qty: 1 | Refills: 1 | Status: DISCONTINUED | COMMUNITY
Start: 2019-06-05 | End: 2022-05-16

## 2022-05-16 RX ORDER — CLOBETASOL PROPIONATE 0.5 MG/G
0.05 CREAM TOPICAL
Qty: 60 | Refills: 0 | Status: DISCONTINUED | COMMUNITY
Start: 2017-01-18 | End: 2022-05-16

## 2022-05-16 RX ORDER — OMALIZUMAB 202.5 MG/1.4ML
INJECTION, SOLUTION SUBCUTANEOUS
Refills: 0 | Status: DISCONTINUED | COMMUNITY
End: 2022-05-16

## 2022-05-24 PROBLEM — S81.801D LEG WOUND, RIGHT, SUBSEQUENT ENCOUNTER: Status: ACTIVE | Noted: 2019-03-27

## 2022-05-24 NOTE — HISTORY OF PRESENT ILLNESS
[FreeTextEntry1] : Mr. CLAYTON SALAZAR   presents to the office with a wound since March. The wound is located on  the right leg with scabs on left leg. The patient has complaints of stinging pain 2/10 and drainage.  The patient has been dressing the wound with alginate and ace wrap.  The patient denies fevers or chills. The patient has localized pain to the wound upon dressing changes. The patient has no other complaints or associated symptoms. \par \par Patient using Lymphedema pump 2x's per day daily. \par Not itching

## 2022-05-24 NOTE — ASSESSMENT
[FreeTextEntry1] : The patient presents with a wound to the right leg and scabs to left leg.  Swelling noted to the extremity.  \par No clinical sign of infection\par \par patient using lymphedema pump daily, 2x's per day.\par Gets SoB easily, started new medication for COPD\par

## 2022-05-24 NOTE — PLAN
[FreeTextEntry1] : Plan\par Apply lidocaine or topical anesthetic if needed to reduce pain upon washing the wound.\par Wash wound with ---- Dove skin sensitive soap and clean water \par Apply ----alginate\par Apply ---- compression garment\par Change dressing ---daily\par \par Leg elevation as tolerated\par Encouraged ambulation or exercise.\par Optimization of nutrition.\par Offloading to the wound site.\par \par -----Wound supplies ordered via DME\par Patient given contact information to DME\par \par Follow up appointment scheduled for 1 week\par \par TeleHealth Services discussed with the patient and/or family.  Discharge instructions given including download of Dash information regarding:\par 1)  [facility] to obtain medical records\par 2)  [facility] to conduct Face-to-Face TeleHealth visit\par 3)  [employer] for the Patient (patient takes a picture of their wound which is sent to the patient's chart for review)\par \par

## 2022-05-24 NOTE — PHYSICAL EXAM
[JVD] : no jugular venous distention  [Normal Breath Sounds] : Normal breath sounds [2+] : right 2+ [Ankle Swelling (On Exam)] : present [Ankle Swelling Bilaterally] : bilaterally  [No Rash or Lesion] : No rash or lesion [Alert] : alert [Oriented to Person] : oriented to person [Oriented to Place] : oriented to place [Oriented to Time] : oriented to time [Calm] : calm [de-identified] : NAD, ambulatory [de-identified] : AT [de-identified] : supple [de-identified] : soft [Please See PDF for [employer]] : Please See PDF for [employer].

## 2022-05-27 ENCOUNTER — APPOINTMENT (OUTPATIENT)
Dept: WOUND CARE | Facility: CLINIC | Age: 82
End: 2022-05-27

## 2022-06-01 NOTE — ASSESSMENT
[FreeTextEntry1] : The plan for the patient is as follows:\par \par 1. Asthma, severe persistent \par - Recieved Xolair 225 mg Sub-Q given in 2 divided doses of 75 mg and 150 mg injections.\par - Pt to increase use from BID DuoNeb to TID to QID.  Explained to patient Duoneb also contains Albuterol and he can use either nebulized treatment or 2 puffs of his rescue inhaler pre-exercise to see if additional benefit.\par - Continue Symbicort 160 2 inhalations BID\par - Continue Incruse 1 inhalation QD \par - Continue Singulair 10 mg PO QHS.\par \par 2. MIGUEL; likely r/t deconditioning/weight gain:\par - 6MWT results discussed with patient at visit. Explained that his SPO2 is on lower side of normal, but he is not a candidate for supplemental O2.\par - Patient advised to schedule visit with Cardiologist to assess if underlying cardiac issue.\par \par 3. Post-nasal drip, likely causing cough:\par - Add Azelastine Nasal Spray; 1 squirt each nostril BID (pt notes he has at home, but hasn't used it).\par - Continue QNasl 80 mcg; 1 squirt each nostril BID\par \par 4. Chronic Sinus Complaints:\par - Increase use of Nettipot or advised patient to purchase Navage to help clear out nasal/sinus passageways. \par \par 5. Abnormal CT\par - Last 7/2019 showing 3mm KALPANA Nodule and mucus impactions.\par - Continue use of Acapella device; 10-20 breaths BID-TID. \par - Chest CT RX given to patient for follow-up.\par \par 6. KHADRA - CPAP in use:\par - Patient compliant on CPAP. Has had Resmed Airsense device RX'ed 8/2017.\par - Discussed therapy report with patient explaining from 5/20-6/18/20 his AHI is 0.2 which indicates adequate treatment. \par \par Patient to call with further questions or concerns.\par Pt. to RTO in 2 weeks for next Xolair injection. \par Patient to schedule f/u visit with Dr. Zeng. \par Patient verbalizes understanding and is agreeable.  No

## 2022-06-08 ENCOUNTER — APPOINTMENT (OUTPATIENT)
Dept: WOUND CARE | Facility: CLINIC | Age: 82
End: 2022-06-08

## 2022-07-21 RX ORDER — EPINEPHRINE 0.3 MG/.3ML
0.3 INJECTION INTRAMUSCULAR
Qty: 1 | Refills: 0 | Status: ACTIVE | COMMUNITY
Start: 2022-04-15 | End: 1900-01-01

## 2022-08-04 NOTE — REASON FOR VISIT
[At Term] : at term [None] : there were no delivery complications [FreeTextEntry1] : Dr Ney Prescott

## 2022-10-25 ENCOUNTER — APPOINTMENT (OUTPATIENT)
Dept: PULMONOLOGY | Facility: CLINIC | Age: 82
End: 2022-10-25

## 2023-01-01 ENCOUNTER — TRANSCRIPTION ENCOUNTER (OUTPATIENT)
Age: 83
End: 2023-01-01

## 2023-01-01 ENCOUNTER — APPOINTMENT (OUTPATIENT)
Dept: PULMONOLOGY | Facility: CLINIC | Age: 83
End: 2023-01-01
Payer: COMMERCIAL

## 2023-01-01 ENCOUNTER — APPOINTMENT (OUTPATIENT)
Dept: WOUND CARE | Facility: CLINIC | Age: 83
End: 2023-01-01
Payer: COMMERCIAL

## 2023-01-01 ENCOUNTER — APPOINTMENT (OUTPATIENT)
Dept: CT IMAGING | Facility: CLINIC | Age: 83
End: 2023-01-01
Payer: COMMERCIAL

## 2023-01-01 ENCOUNTER — APPOINTMENT (OUTPATIENT)
Dept: PULMONOLOGY | Facility: CLINIC | Age: 83
End: 2023-01-01

## 2023-01-01 ENCOUNTER — APPOINTMENT (OUTPATIENT)
Dept: WOUND CARE | Facility: CLINIC | Age: 83
End: 2023-01-01

## 2023-01-01 ENCOUNTER — RX RENEWAL (OUTPATIENT)
Age: 83
End: 2023-01-01

## 2023-01-01 VITALS
RESPIRATION RATE: 16 BRPM | WEIGHT: 194 LBS | BODY MASS INDEX: 25.71 KG/M2 | HEIGHT: 73 IN | HEART RATE: 70 BPM | TEMPERATURE: 97.3 F | DIASTOLIC BLOOD PRESSURE: 70 MMHG | SYSTOLIC BLOOD PRESSURE: 116 MMHG | OXYGEN SATURATION: 96 %

## 2023-01-01 VITALS
RESPIRATION RATE: 16 BRPM | HEIGHT: 73 IN | HEART RATE: 77 BPM | OXYGEN SATURATION: 95 % | TEMPERATURE: 97.2 F | SYSTOLIC BLOOD PRESSURE: 118 MMHG | BODY MASS INDEX: 25.71 KG/M2 | DIASTOLIC BLOOD PRESSURE: 70 MMHG | WEIGHT: 194 LBS

## 2023-01-01 DIAGNOSIS — G47.33 OBSTRUCTIVE SLEEP APNEA (ADULT) (PEDIATRIC): ICD-10-CM

## 2023-01-01 DIAGNOSIS — Z96.652 PRESENCE OF LEFT ARTIFICIAL KNEE JOINT: ICD-10-CM

## 2023-01-01 DIAGNOSIS — L97.912 NON-PRESSURE CHRONIC ULCER OF UNSPECIFIED PART OF RIGHT LOWER LEG WITH FAT LAYER EXPOSED: ICD-10-CM

## 2023-01-01 DIAGNOSIS — J45.50 SEVERE PERSISTENT ASTHMA, UNCOMPLICATED: ICD-10-CM

## 2023-01-01 DIAGNOSIS — R93.89 ABNORMAL FINDINGS ON DIAGNOSTIC IMAGING OF OTHER SPECIFIED BODY STRUCTURES: ICD-10-CM

## 2023-01-01 DIAGNOSIS — J44.9 CHRONIC OBSTRUCTIVE PULMONARY DISEASE, UNSPECIFIED: ICD-10-CM

## 2023-01-01 DIAGNOSIS — L97.922 NON-PRESSURE CHRONIC ULCER OF UNSPECIFIED PART OF LEFT LOWER LEG WITH FAT LAYER EXPOSED: ICD-10-CM

## 2023-01-01 DIAGNOSIS — B37.2 CANDIDIASIS OF SKIN AND NAIL: ICD-10-CM

## 2023-01-01 PROCEDURE — 94618 PULMONARY STRESS TESTING: CPT

## 2023-01-01 PROCEDURE — 94727 GAS DIL/WSHOT DETER LNG VOL: CPT

## 2023-01-01 PROCEDURE — 74160 CT ABDOMEN W/CONTRAST: CPT

## 2023-01-01 PROCEDURE — 94010 BREATHING CAPACITY TEST: CPT

## 2023-01-01 PROCEDURE — 99214 OFFICE O/P EST MOD 30 MIN: CPT

## 2023-01-01 PROCEDURE — ZZZZZ: CPT

## 2023-01-01 PROCEDURE — 94729 DIFFUSING CAPACITY: CPT

## 2023-01-01 PROCEDURE — 99214 OFFICE O/P EST MOD 30 MIN: CPT | Mod: 25

## 2023-01-01 PROCEDURE — 99213 OFFICE O/P EST LOW 20 MIN: CPT

## 2023-01-01 RX ORDER — NYSTATIN 100000 [USP'U]/ML
100000 SUSPENSION ORAL 3 TIMES DAILY
Qty: 105 | Refills: 2 | Status: ACTIVE | COMMUNITY
Start: 2023-01-01 | End: 1900-01-01

## 2023-01-01 RX ORDER — IPRATROPIUM BROMIDE AND ALBUTEROL SULFATE 2.5; .5 MG/3ML; MG/3ML
0.5-2.5 (3) SOLUTION RESPIRATORY (INHALATION)
Qty: 3 | Refills: 1 | Status: ACTIVE | COMMUNITY
Start: 2017-03-28 | End: 1900-01-01

## 2023-01-01 RX ORDER — ALBUTEROL SULFATE 90 UG/1
108 (90 BASE) AEROSOL, METERED RESPIRATORY (INHALATION) EVERY 6 HOURS
Qty: 1 | Refills: 1 | Status: ACTIVE | COMMUNITY
Start: 2023-03-31 | End: 1900-01-01

## 2023-01-01 RX ORDER — ALBUTEROL SULFATE 90 UG/1
108 (90 BASE) INHALANT RESPIRATORY (INHALATION)
Qty: 3 | Refills: 1 | Status: ACTIVE | COMMUNITY
Start: 2023-04-03 | End: 1900-01-01

## 2023-01-01 RX ORDER — NYSTATIN 100000 [USP'U]/G
100000 CREAM TOPICAL 3 TIMES DAILY
Qty: 1 | Refills: 5 | Status: ACTIVE | COMMUNITY
Start: 2023-01-01 | End: 1900-01-01

## 2023-01-01 RX ORDER — FORMOTEROL FUMARATE DIHYDRATE 0.02 MG/2ML
20 SOLUTION RESPIRATORY (INHALATION)
Qty: 180 | Refills: 1 | Status: ACTIVE | COMMUNITY
Start: 2023-03-31 | End: 1900-01-01

## 2023-03-31 ENCOUNTER — APPOINTMENT (OUTPATIENT)
Dept: PULMONOLOGY | Facility: CLINIC | Age: 83
End: 2023-03-31
Payer: COMMERCIAL

## 2023-03-31 VITALS
OXYGEN SATURATION: 97 % | SYSTOLIC BLOOD PRESSURE: 110 MMHG | HEIGHT: 73 IN | RESPIRATION RATE: 20 BRPM | HEART RATE: 76 BPM | WEIGHT: 205 LBS | TEMPERATURE: 98 F | DIASTOLIC BLOOD PRESSURE: 56 MMHG | BODY MASS INDEX: 27.17 KG/M2

## 2023-03-31 DIAGNOSIS — J41.1 MUCOPURULENT CHRONIC BRONCHITIS: ICD-10-CM

## 2023-03-31 DIAGNOSIS — J39.8 OTHER SPECIFIED DISEASES OF UPPER RESPIRATORY TRACT: ICD-10-CM

## 2023-03-31 DIAGNOSIS — E66.9 OBESITY, UNSPECIFIED: ICD-10-CM

## 2023-03-31 DIAGNOSIS — R06.02 SHORTNESS OF BREATH: ICD-10-CM

## 2023-03-31 DIAGNOSIS — R76.8 OTHER SPECIFIED ABNORMAL IMMUNOLOGICAL FINDINGS IN SERUM: ICD-10-CM

## 2023-03-31 DIAGNOSIS — J47.9 BRONCHIECTASIS, UNCOMPLICATED: ICD-10-CM

## 2023-03-31 PROCEDURE — 94010 BREATHING CAPACITY TEST: CPT

## 2023-03-31 PROCEDURE — 94727 GAS DIL/WSHOT DETER LNG VOL: CPT

## 2023-03-31 PROCEDURE — 94729 DIFFUSING CAPACITY: CPT

## 2023-03-31 PROCEDURE — 99214 OFFICE O/P EST MOD 30 MIN: CPT | Mod: 25

## 2023-03-31 RX ORDER — IPRATROPIUM BROMIDE AND ALBUTEROL SULFATE 2.5; .5 MG/3ML; MG/3ML
0.5-2.5 (3) SOLUTION RESPIRATORY (INHALATION) 4 TIMES DAILY
Qty: 360 | Refills: 1 | Status: ACTIVE | COMMUNITY
Start: 2023-03-31 | End: 1900-01-01

## 2023-03-31 RX ORDER — BUDESONIDE 0.5 MG/2ML
0.5 INHALANT ORAL TWICE DAILY
Qty: 180 | Refills: 1 | Status: ACTIVE | COMMUNITY
Start: 2023-03-31 | End: 1900-01-01

## 2023-03-31 NOTE — HISTORY OF PRESENT ILLNESS
[FreeTextEntry1] : Mr. Jimenez is a 82 year old male presenting to the office for a follow up visit for abnormal chest CT, elevated IgE level, severe asthma, KHADRA, postnasal drip, poor balance, snoring and shortness of breath. His chief complaint is \par - he notes feeling okay in general \par - he notes poor energy level \par - he notes getting winded quiet easily for about a year now \par - he notes losing almost 46 lbs since last visit \par - he denies any visual issues \par - he denies coughing or wheezing \par - he notes PND \par - he notes needing to clear the throat in the morning \par - he notes bowels are regular \par - he notes balance could be better \par - he notes not walking much \par - he notes using CPAP regularly \par - he notes sleep is okay, he notes going to the bathroom every 3-4 hours \par \par - He  denies any headaches, nausea, vomiting, fever, chills, sweats, chest pains, chest pressure, diarrhea, constipation, dysphagia, myalgia, dizziness, leg swelling, leg pain, itchy eyes, itchy ears, heartburn, reflux, or sour taste in the mouth.

## 2023-03-31 NOTE — PHYSICAL EXAM
[No Acute Distress] : no acute distress [Normal Oropharynx] : normal oropharynx [III] : Mallampati Class: III [Normal Appearance] : normal appearance [No Neck Mass] : no neck mass [Normal Rate/Rhythm] : normal rate/rhythm [Normal S1, S2] : normal s1, s2 [No Murmurs] : no murmurs [No Resp Distress] : no resp distress [Clear to Auscultation Bilaterally] : clear to auscultation bilaterally [No Abnormalities] : no abnormalities [Kyphosis] : kyphosis [Benign] : benign [Normal Gait] : normal gait [No Clubbing] : no clubbing [No Cyanosis] : no cyanosis [FROM] : FROM [Normal Color/ Pigmentation] : normal color/ pigmentation [No Focal Deficits] : no focal deficits [Normal Affect] : normal affect [Oriented x3] : oriented x3 [TextBox_2] : OW [TextBox_68] : I:E 1:3, very mild expiratory wheeze  [TextBox_105] : 1+ LE edema

## 2023-03-31 NOTE — PROCEDURE
[FreeTextEntry1] : Full PFT- spi reveals mild restriction and moderate-severe obstruction; FEV1 was 1.57L which is 46% of predicted; hyperinflation; normal diffusion at 20.2, which is 87% of predicted; normal flow volume loop

## 2023-03-31 NOTE — ADDENDUM
[FreeTextEntry1] : Documented by Conner Boyle acting as a scribe for Dr. Jason Zeng on (03/31/2023).\par \par All medical record entries made by the Scribe were at my, Dr. Jason Zeng's, direction and personally dictated by me on (03/31/2023). I have reviewed the chart and agree that the record accurately reflects my personal performance of the history, physical exam, assessment and plan. I have personally directed, reviewed, and agree with the discharge instructions. \par

## 2023-03-31 NOTE — ASSESSMENT
[FreeTextEntry1] : Mr. Jimenez is an 82 year old male with a history of asthma, allergies, chronic mucopurulent bronchitis, GERD, OSAS, HTN, HLD, hypothyroidism, bladder/prostate issue; anxiety/depression, non-smoker, now coming back for pulmonary re-evaluation. He is presenting today with SOB  #1 breathing issue (NC with meds), poor balance \par \par His shortness of breath is felt to be multifactorial due to:\par -severe persistent asthma, controlled \par -poor breathing mechanics\par -emphysema   ? NM DZ-restrictive dz\par -overweight / out of shape\par -? CAD (CHF)\par - ?iron deficiency \par -? 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 Hutchins \par - blood work check: BNP  q 3-4 months\par \par Problem 1B: ?PE (+) D-dimer- CTPA (negative) \par IF Blood clots are noted w/in your lungs diagnosed by either abnormal CTPA or ventilation perfusion scan. You will need a hypercoagulable work up done by a hematologist to attempt to identify the etiology of this problem. Anticoagulation will be needed for at least 6 months- drugs included are Coumadin, lovenox, arixtra, or another agent. Repeat CTPA or VQ scan will be needed in approximately 6-8 weeks. An echocardiogram may be needed to assess for right ventricular function and pulmonary hypertension. The importance of hydration, ambulation, and regular/consistent diet are extremely important. \par \par Problem 1c: ?iron deficiency\par -s/p blood work: iron studies (WNL) \par \par Problem 1D: ? IVA Fatigue/ Dysfunction\par -complete Neurologic evaluation \par \par problem 1: severe persistent asthma / COPD / emphysema (stable)\par -continue DuoNeb by the nebulizer TID to QiD\par -discontinue Symbicort 160 2 inhalations BID then Qvar 80 at 2 puffs BID--> Perforomist/ Budesonide  0.5 mg BID  \par -continue Singulair 10 mg QHS (NC)\par -continue Daliresp 500 mg QD (NC)\par -Asthma is believed to be caused by inherited (genetic) and environmental factor, but its exact cause is unknown. Asthma may be triggered by allergens, lung infections, or irritants in the air. Asthma triggers are different for each person\par -Inhaler technique reviewed as well as oral hygiene techniques reviewed with patient. Avoidance of cold air, extremes of temperature, rescue inhaler should be used before exercise. Order of medication reviewed with patient. Recommended use of a cool mist humidifier in the bedroom. \par \par Problem 1A: Biologic Rx\par -Tezpire Trial (first shot administered in the office 4/26/2022)- NC with Rx\par \par problem 2: poor breathing mechanics / poor balance\par -Recommended Wim Hof and Buteyko breathing techniques \par -recommended to get balance therapy\par -Proper breathing techniques were reviewed with an emphasis of exhalation. Patient instructed to breath in for 1 second and out for four seconds. Patient was encouraged to not talk while walking. \par \par problem 3: abnormal chest CT/ r/o TBM (50%)\par -c/w mucus plugging\par -recommended to add acapella device to be done multiple times daily \par -follow up chest CT (last 12/2020, next 12/2021) (repeat) 3/2023\par CAT scans are the only radiological modality to identify abnormalities w/in the lungs with regards to nodules/masses/lymph nodes. Risks, benefits were reviewed in detail. The guidelines for abnormalities include follow up CT scans at various intervals which could range from 6 weeks to 1 year intervals. If there is a change for the worse then consideration for a biopsy will be considered if you are a candidate. Second opinion evaluation with a thoracic surgeon or an interventional radiologist could be offered. \par \par Problem 3A: Chronic mucopurulent bronchitis\par -chest vest therapy in progress (NC)\par -patient tried and failed acapella device therapy\par Patient has a chronic cough greater than 6 months, tried and failed manual chest physiotherapy at home, no skilled caregiver available at home to perform manual CPT, tried and failed acapella vibratory physiotherapy, and recommended chest vest therapy \par \par Problem 3B: Cirrhosis - ?portopulmonary HTN\par -recommended hepatologist evaluation (Jose) \par \par problem 4: allergic rhinitis\par -Ryvent 6mg QDay \par -recommended to try Navage nasal system \par -continue Astelin 0.15% 1 inhalation each nostril BID \par -continue Clarinex 5 mg QHS \par -continue Qnasl 1 sniff/nostril BID\par -Recommending Zaditor 1 eye drop BID\par -Environmental measures for allergies were encouraged including mattress and pillow cover, air purifier, and environmental controls.\par \par problem 5: r/o ABPA \par -Based on blood work (not present)\par \par problem 6: obesity (Imporved) \par -Recommend "Muniq" OTC Supplement \par -recommended to read 'Bright Spots and Landmines' by author Toi Gillette and 'Obesity Code' by Jason Reis \par -Weight loss, exercise, and diet control were discussed and are highly encouraged. Treatment options were given such as, aqua therapy, and contacting a nutritionist. Recommended to use the elliptical, stationary bike, less use of treadmill. Obesity is associated with worsening asthma, shortness of breath, and potential for cardiac disease, diabetes, and other underlying medical conditions.\par \par problem 7: OSAS (stable now)\par -continue CPAP (increased pressure necessary)- compliant\par -Sleep apnea is associated with adverse clinical consequences which an affect most organ systems. Cardiovascular disease risk includes arrhythmias, atrial fibrillation, hypertension, coronary artery disease, and stroke. Metabolic disorders include diabetes type 2, non-alcoholic fatty liver disease. Mood disorder especially depression; and cognitive decline especially in the elderly. Associations with chronic reflux/Mancera’s esophagus some but not all inclusive. \par -Reasons to assess this include arousal consistent with hypopnea; respiratory events most prominent in REM sleep or supine position; therefore sleep staging and body position are important for accurate diagnosis and estimation of AHI. \par \par Problem 8: elevated IgE level \par -Received Xolair last Spring 2022\par -Xolair is a recombinant DNA- derived humanized IgG1K monoclonal antibody that selectively binds to human immunoglobulin E (IgE). Xolair is produced by a Chinese hamster ovary cell suspension culture in nutrient medium containing the antibiotic gentamicin. Gentamicin is not detectable in the final product. Xolair is a sterile, white, preservative free, lyophilized powder contained in a single use vial that is reconstituted with sterile water for suspension. Side effects include: wheezing, tightness of the chest, trouble breathing, hives, skin rash, feeling anxious or light-headed, fainting, warmth or tingling under skin, or swelling of face, lips, or tongue \par \par problem 9: Health Maintenance/COVID19 Precautions:\par -s/p Pfizer COVID 19 Vaccine x2\par - Clean your hands often. Wash your hands often with soap and water for at least 20 seconds, especially after blowing your nose, coughing, or sneezing, or having been in a public place.\par - If soap and water are not available, use a hand  that contains at least 60% alcohol.\par - To the extent possible, avoid touching high-touch surfaces in public places - elevator buttons, door handles, handrails, handshaking with people, etc. Use a tissue or your sleeve to cover your hand or finger if you must touch something.\par - Wash your hands after touching surfaces in public places.\par - Avoid touching your face, nose, eyes, etc.\par - Clean and disinfect your home to remove germs: practice routine cleaning of frequently touched surfaces (for example: tables, doorknobs, light switches, handles, desks, toilets, faucets, sinks & cell phones)\par - Avoid crowds, especially in poorly ventilated spaces. Your risk of exposure to respiratory viruses like COVID-19 may increase in crowded, closed-in settings with little air circulation if there are people in the crowd who are sick. All patients are recommended to practice social distancing and stay at least 6 feet away from others.\par - Avoid all non-essential travel including plane trips, and especially avoid embarking on cruise ships.\par -If COVID-19 is spreading in your community, take extra measures to put distance between yourself and other people to further reduce your risk of being exposed to this new virus.\par -Stay home as much as possible.\par - Consider ways of getting food brought to your house through family, social, or commercial networks\par -Be aware that the virus has been known to live in the air up to 3 hours post exposure, cardboard up to 24 hours post exposure, copper up to 4 hours post exposure, steel and plastic up to 2-3 days post exposure. Risk of transmission from these surfaces are affected by many variables.\par Immune Support Recommendations:\par -OTC Vitamin C 500mg BID \par -OTC Quercetin 250-500mg BID \par -OTC Zinc 75-100mg per day \par -OTC Melatonin 1 or 2 mg a night \par -OTC Vitamin D 1-4000mg per day \par -OTC Tonic Water 8oz per day\par Asthma and COVID19:\par You need to make sure your asthma is under control. This often requires the use of inhaled corticosteroids (and sometimes oral corticosteroids). Inhaled corticosteroids do not likely reduce your immune system’s ability to fight infections, but oral corticosteroids may. It is important to use the steps above to protect yourself to limit your exposure to any respiratory virus. \par \par Problem 10: health maintenance - Balance therapy \par -recommended pulmonary rehabilitation ([facility]) \par -s/p influenza vaccination 2020\par -recommended strep pneumonia vaccines: Prevnar-13 vaccine, followed by Pneumo vaccine 23 one year following (completed)\par -recommended early intervention for URIs\par -recommended regular osteoporosis evaluations\par -recommended early dermatological evaluations\par -recommended after the age of 50 to the age of 70, colonoscopy every 5 years \par  \par F/U in 3-4 weeks \par He is encouraged to call with any changes, concerns, or questions.

## 2023-04-03 RX ORDER — ALBUTEROL SULFATE 90 UG/1
108 (90 BASE) AEROSOL, METERED RESPIRATORY (INHALATION) EVERY 6 HOURS
Qty: 3 | Refills: 1 | Status: DISCONTINUED | COMMUNITY
Start: 2018-03-07 | End: 2023-04-03

## 2023-04-10 ENCOUNTER — APPOINTMENT (OUTPATIENT)
Dept: CT IMAGING | Facility: CLINIC | Age: 83
End: 2023-04-10
Payer: COMMERCIAL

## 2023-04-10 PROCEDURE — 71250 CT THORAX DX C-: CPT

## 2023-04-10 NOTE — ADDENDUM
[FreeTextEntry1] : Documented by Micah Hou acting as a scribe for Dr. Jason Zeng on 05/01/2019 \par \par All medical record entries made by the Scribe were at my, Dr. Jason Zeng's, direction and personally dictated by me on 05/01/2019  . I have reviewed the chart and agree that the record accurately reflects my personal performance of the history, physical exam, procedure, assessment and plan. I have also personally directed, reviewed, and agree with the discharge instructions. \par \par  
Yes

## 2023-04-21 ENCOUNTER — NON-APPOINTMENT (OUTPATIENT)
Age: 83
End: 2023-04-21

## 2023-05-17 ENCOUNTER — NON-APPOINTMENT (OUTPATIENT)
Age: 83
End: 2023-05-17

## 2023-07-21 NOTE — ASSESSMENT
[FreeTextEntry1] : Mr. Jimenez is an 83 year old male with a history of asthma, allergies, chronic mucopurulent bronchitis, GERD, OSAS, HTN, HLD, hypothyroidism, bladder/prostate issue; anxiety/depression, non-smoker, now coming back for pulmonary re-evaluation. He is presenting today with SOB  #1 breathing issue (NC with meds), poor balance \par \par His shortness of breath is felt to be multifactorial due to:\par -severe persistent asthma, controlled \par -poor breathing mechanics\par -emphysema   ? NM DZ-restrictive dz\par -overweight / out of shape\par -? CAD (CHF)\par - ?iron deficiency \par -? 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 Hutchins \par - blood work check: BNP  q 3-4 months\par \par Problem 1B: ?PE (+) D-dimer- CTPA (negative) \par IF Blood clots are noted w/in your lungs diagnosed by either abnormal CTPA or ventilation perfusion scan. You will need a hypercoagulable work up done by a hematologist to attempt to identify the etiology of this problem. Anticoagulation will be needed for at least 6 months- drugs included are Coumadin, lovenox, arixtra, or another agent. Repeat CTPA or VQ scan will be needed in approximately 6-8 weeks. An echocardiogram may be needed to assess for right ventricular function and pulmonary hypertension. The importance of hydration, ambulation, and regular/consistent diet are extremely important. \par \par Problem 1c: ?iron deficiency\par -s/p blood work: iron studies (WNL) \par \par Problem 1D: ? IVA Fatigue/ Dysfunction\par -complete Neurologic evaluation \par \par problem 1: severe persistent asthma / COPD / emphysema (stable)\par -continue DuoNeb by the nebulizer TID to QiD\par -discontinue Symbicort 160 2 inhalations BID then Qvar 80 at 2 puffs BID--> Perforomist/ Budesonide  0.5 mg BID  \par -continue Singulair 10 mg QHS (NC)\par -continue Daliresp 500 mg QD (NC)\par -Asthma is believed to be caused by inherited (genetic) and environmental factor, but its exact cause is unknown. Asthma may be triggered by allergens, lung infections, or irritants in the air. Asthma triggers are different for each person\par -Inhaler technique reviewed as well as oral hygiene techniques reviewed with patient. Avoidance of cold air, extremes of temperature, rescue inhaler should be used before exercise. Order of medication reviewed with patient. Recommended use of a cool mist humidifier in the bedroom. \par \par Problem 1A: Biologic Rx\par -Tezpire Trial (first shot administered in the office 4/26/2022)- NC with Rx\par \par problem 2: poor breathing mechanics / poor balance\par -Recommended Wim Hof and Buteyko breathing techniques \par -recommended to get balance therapy\par -Proper breathing techniques were reviewed with an emphasis of exhalation. Patient instructed to breath in for 1 second and out for four seconds. Patient was encouraged to not talk while walking. \par \par problem 3: abnormal chest CT/ r/o TBM (50%)\par -c/w mucus plugging\par -recommended to add acapella device to be done multiple times daily \par -follow up chest CT (last 12/2020, next 12/2021) (repeat) 3/2023\par CAT scans are the only radiological modality to identify abnormalities w/in the lungs with regards to nodules/masses/lymph nodes. Risks, benefits were reviewed in detail. The guidelines for abnormalities include follow up CT scans at various intervals which could range from 6 weeks to 1 year intervals. If there is a change for the worse then consideration for a biopsy will be considered if you are a candidate. Second opinion evaluation with a thoracic surgeon or an interventional radiologist could be offered. \par \par Problem 3A: Chronic mucopurulent bronchitis\par -chest vest therapy in progress (NC)\par -patient tried and failed acapella device therapy\par Patient has a chronic cough greater than 6 months, tried and failed manual chest physiotherapy at home, no skilled caregiver available at home to perform manual CPT, tried and failed acapella vibratory physiotherapy, and recommended chest vest therapy \par \par Problem 3B: Cirrhosis - ?portopulmonary HTN\par -recommended hepatologist evaluation (Jose) \par \par problem 4: allergic rhinitis\par -Ryvent 6mg QDay \par -recommended to try Navage nasal system \par -continue Astelin 0.15% 1 inhalation each nostril BID \par -continue Clarinex 5 mg QHS \par -continue Qnasl 1 sniff/nostril BID\par -Recommending Zaditor 1 eye drop BID\par -Environmental measures for allergies were encouraged including mattress and pillow cover, air purifier, and environmental controls.\par \par problem 5: r/o ABPA \par -Based on blood work (not present)\par \par problem 6: obesity (Imporved) \par -Recommend "Muniq" OTC Supplement \par -recommended to read 'Bright Spots and Landmines' by author Toi Gillette and 'Obesity Code' by Jason Reis \par -Weight loss, exercise, and diet control were discussed and are highly encouraged. Treatment options were given such as, aqua therapy, and contacting a nutritionist. Recommended to use the elliptical, stationary bike, less use of treadmill. Obesity is associated with worsening asthma, shortness of breath, and potential for cardiac disease, diabetes, and other underlying medical conditions.\par \par problem 7: OSAS (stable now)\par -continue CPAP (increased pressure necessary)- compliant\par -Sleep apnea is associated with adverse clinical consequences which an affect most organ systems. Cardiovascular disease risk includes arrhythmias, atrial fibrillation, hypertension, coronary artery disease, and stroke. Metabolic disorders include diabetes type 2, non-alcoholic fatty liver disease. Mood disorder especially depression; and cognitive decline especially in the elderly. Associations with chronic reflux/Mancera’s esophagus some but not all inclusive. \par -Reasons to assess this include arousal consistent with hypopnea; respiratory events most prominent in REM sleep or supine position; therefore sleep staging and body position are important for accurate diagnosis and estimation of AHI. \par \par Problem 8: elevated IgE level \par -Received Xolair last Spring 2022\par -Xolair is a recombinant DNA- derived humanized IgG1K monoclonal antibody that selectively binds to human immunoglobulin E (IgE). Xolair is produced by a Chinese hamster ovary cell suspension culture in nutrient medium containing the antibiotic gentamicin. Gentamicin is not detectable in the final product. Xolair is a sterile, white, preservative free, lyophilized powder contained in a single use vial that is reconstituted with sterile water for suspension. Side effects include: wheezing, tightness of the chest, trouble breathing, hives, skin rash, feeling anxious or light-headed, fainting, warmth or tingling under skin, or swelling of face, lips, or tongue \par \par problem 9: Health Maintenance/COVID19 Precautions:\par -s/p Pfizer COVID 19 Vaccine x2\par - Clean your hands often. Wash your hands often with soap and water for at least 20 seconds, especially after blowing your nose, coughing, or sneezing, or having been in a public place.\par - If soap and water are not available, use a hand  that contains at least 60% alcohol.\par - To the extent possible, avoid touching high-touch surfaces in public places - elevator buttons, door handles, handrails, handshaking with people, etc. Use a tissue or your sleeve to cover your hand or finger if you must touch something.\par - Wash your hands after touching surfaces in public places.\par - Avoid touching your face, nose, eyes, etc.\par - Clean and disinfect your home to remove germs: practice routine cleaning of frequently touched surfaces (for example: tables, doorknobs, light switches, handles, desks, toilets, faucets, sinks & cell phones)\par - Avoid crowds, especially in poorly ventilated spaces. Your risk of exposure to respiratory viruses like COVID-19 may increase in crowded, closed-in settings with little air circulation if there are people in the crowd who are sick. All patients are recommended to practice social distancing and stay at least 6 feet away from others.\par - Avoid all non-essential travel including plane trips, and especially avoid embarking on cruise ships.\par -If COVID-19 is spreading in your community, take extra measures to put distance between yourself and other people to further reduce your risk of being exposed to this new virus.\par -Stay home as much as possible.\par - Consider ways of getting food brought to your house through family, social, or commercial networks\par -Be aware that the virus has been known to live in the air up to 3 hours post exposure, cardboard up to 24 hours post exposure, copper up to 4 hours post exposure, steel and plastic up to 2-3 days post exposure. Risk of transmission from these surfaces are affected by many variables.\par Immune Support Recommendations:\par -OTC Vitamin C 500mg BID \par -OTC Quercetin 250-500mg BID \par -OTC Zinc 75-100mg per day \par -OTC Melatonin 1 or 2 mg a night \par -OTC Vitamin D 1-4000mg per day \par -OTC Tonic Water 8oz per day\par Asthma and COVID19:\par You need to make sure your asthma is under control. This often requires the use of inhaled corticosteroids (and sometimes oral corticosteroids). Inhaled corticosteroids do not likely reduce your immune system’s ability to fight infections, but oral corticosteroids may. It is important to use the steps above to protect yourself to limit your exposure to any respiratory virus. \par \par Problem 10: health maintenance - Balance therapy \par -recommended pulmonary rehabilitation ([facility]) \par -s/p influenza vaccination 2020\par -recommended strep pneumonia vaccines: Prevnar-13 vaccine, followed by Pneumo vaccine 23 one year following (completed)\par -recommended early intervention for URIs\par -recommended regular osteoporosis evaluations\par -recommended early dermatological evaluations\par -recommended after the age of 50 to the age of 70, colonoscopy every 5 years \par  \par F/U in 3-4 weeks \par He is encouraged to call with any changes, concerns, or questions.

## 2023-07-21 NOTE — HISTORY OF PRESENT ILLNESS
[FreeTextEntry1] : Mr. Jimenez is a 83 year old male presenting to the office for a follow up visit for abnormal chest CT, elevated IgE level, severe asthma, KHADRA, postnasal drip, poor balance, snoring and shortness of breath. His chief complaint is \par \par \par -he denies any headaches, nausea, emesis, fever, chills, sweats, chest pain, chest pressure, coughing, wheezing, palpitations, diarrhea, constipation, dysphagia, vertigo, arthralgias, myalgias, leg swelling, itchy eyes, itchy ears, heartburn, reflux, or sour taste in the mouth.

## 2023-07-21 NOTE — ADDENDUM
[FreeTextEntry1] : Documented by Vidhya Nassar acting as a scribe for Dr. Jason Zeng on 07/21/2023. All medical record entries made by the Scribe were at my, Dr. Jason Zeng's, direction and personally dictated by me on 07/21/2023. I have reviewed the chart and agree that the record accurately reflects my personal performance of the history, physical exam, assessment and plan. I have also personally directed, reviewed, and agree with the discharge instructions.\par \par

## 2023-07-21 NOTE — PHYSICAL EXAM
[No Acute Distress] : no acute distress [Normal Oropharynx] : normal oropharynx [III] : Mallampati Class: III [Normal Appearance] : normal appearance [No Neck Mass] : no neck mass [Normal Rate/Rhythm] : normal rate/rhythm [Normal S1, S2] : normal s1, s2 [No Murmurs] : no murmurs [No Resp Distress] : no resp distress [Clear to Auscultation Bilaterally] : clear to auscultation bilaterally [No Abnormalities] : no abnormalities [Kyphosis] : kyphosis [Benign] : benign [Normal Gait] : normal gait [No Clubbing] : no clubbing [No Cyanosis] : no cyanosis [FROM] : FROM [Normal Color/ Pigmentation] : normal color/ pigmentation [No Focal Deficits] : no focal deficits [Oriented x3] : oriented x3 [Normal Affect] : normal affect [TextBox_68] : I:E 1:3, very mild expiratory wheeze  [TextBox_2] : OW [TextBox_105] : 1+ LE edema

## 2023-08-01 NOTE — DISCUSSION/SUMMARY
[FreeTextEntry1] : Pulmonary testing performed in office today because of SOBOE.  6MWT Performed in office WNL, but discontinued early at 3 minute wyatt due to SOBOE & lower back pain.  RA SPO2 @ REST: 98%  RA SPO2 on EXERTION at minute 3: 96%    PFT's performed in office show  FEV1: 40%  FEV1/FVC Ratio: 50 [id number]%: 14%  DLCO: 44%   3/31/2023 PFT's performed in office show  FEV1: 46%  FEV1/FVC Ratio: 52 [id number]%:  22%  DLCO: 87%

## 2023-08-01 NOTE — ASSESSMENT
[FreeTextEntry1] : Mr. Jimenez is an 83 year old, nonsmoking, male with a history of asthma, allergies, chronic mucopurulent bronchitis - noncompliant with medications, GERD, OSAS, HTN, HLD, hypothyroidism, bladder/prostate issue; anxiety/depression, & poor balance. He presents for pulmonary re-evaluation. He is accompanied by his wife. His chief concern is worsening SOBOE x 2 months.   1. SOB: His shortness of breath is felt to be multifactorial due to: - severe persistent asthma/COPD - poor breathing mechanics - less likely PE: D-Dimer RX in place for patient to report to [facility] lab to have drawn.  - ? NM DZ-restrictive dz - overweight / out of shape - ? CAD (CHF) - follow with cards  - ?Kerry PAH: follow with hepatology.   2.  severe persistent asthma / COPD / emphysema: - Add DuoNeb by the nebulizer or Albuterol 2 puffs HFA - can use Q6H. Advised to use Duoneb at least BID and prior to PT. - Continue Perforomist via neb and then Budesonide 0.5 mg BID. Rinse and gargle post use.  - continue Singulair 10 mg QHS; patient states he was previously advised to discontinue use.   3. abnormal chest CT: - c/w mucus plugging; ecommended to add acapella device to be done multiple times daily. Patient states he is obtaining new blue acapella device. - follow up chest CT 3/2024.  - Patient has CT A/P tomorrow to follow up on splenomegaly.   4. KHADRA: - I have discussed all the negative health consequences associated with obstructive and central sleep apnea including heart conditions/MI, hypertension, diabetes, chronic inflammation, memory issues, stroke, obesity, decreased libido, sleep related accidents, as well as anxiety and depression.  - Additional recommendations included: Avoid alcohol and sedatives at bedtime. Proper sleep hygiene such as maintaining a regular sleep routine, avoiding naps if possible, not watching TV or reading in bed,  and maintaining a quiet, comfortable bedroom. Sleepy driving avoidance and risks discussed with patient.  - Diet, exercise and weight loss suggested. - Patient notes he has not used CPAP device for years.   Patient to follow up with Dr. Zeng as scheduled. Patient to call with further questions and concerns. Patient verbalizes understanding of care plan and is agreeable.

## 2023-08-01 NOTE — PHYSICAL EXAM
[No Acute Distress] : no acute distress [No Deformities] : no deformities [Normal Appearance] : normal appearance [No Neck Mass] : no neck mass [Normal Rate/Rhythm] : normal rate/rhythm [Normal S1, S2] : normal s1, s2 [No Murmurs] : no murmurs [No Resp Distress] : no resp distress [Clear to Auscultation Bilaterally] : clear to auscultation bilaterally [No Abnormalities] : no abnormalities [Gait - Sufficient For Exercise Testing] : gait sufficient for exercise testing [No Clubbing] : no clubbing [No Cyanosis] : no cyanosis [No Edema] : no edema [FROM] : FROM [Normal Color/ Pigmentation] : normal color/ pigmentation [No Focal Deficits] : no focal deficits [Oriented x3] : oriented x3 [Normal Affect] : normal affect [TextBox_99] : In wheelchair, but able to ambulate with use of walker.

## 2023-08-01 NOTE — REASON FOR VISIT
[Follow-Up] : a follow-up visit [Abnormal CXR/ Chest CT] : an abnormal CXR/ chest CT [Asthma] : asthma [COPD] : COPD

## 2023-08-01 NOTE — REVIEW OF SYSTEMS
[SOB on Exertion] : sob on exertion [Negative] : Endocrine [Cough] : no cough [Chest Tightness] : no chest tightness [Sputum] : no sputum [Dyspnea] : no dyspnea [Wheezing] : no wheezing

## 2023-08-01 NOTE — HISTORY OF PRESENT ILLNESS
[TextBox_4] : Mr. Jimenez is an 83 year old, nonsmoking, male with a history of asthma, allergies, chronic mucopurulent bronchitis - noncompliant with medications, GERD, OSAS, HTN, HLD, hypothyroidism, bladder/prostate issue; anxiety/depression, & poor balance. He presents for pulmonary re-evaluation. He is accompanied by his wife.   His chief concern is worsening SOBOE x 2 months.   Patient states he was admitted to [facility] and then discharged to Rehab from May until 7/14/2023. He states since then he has experienced increased SOBOE. He states he is compliant on nebulized Formoterol and Budesonide via Neb BID. He states he now has PT at home, but has needed his Albuterol rescue inhaler during therapy sessions. He admits that part of it is r/t anxiety as he panics once SOB starts. He has concerns he may require supplemental O2.   He denies fever/chills, decreased appetite, increased fatigue, SOB @ rest, wheezing, cough or chest tightness.

## 2023-08-03 PROBLEM — B37.2 CANDIDAL SKIN INFECTION: Status: ACTIVE | Noted: 2023-01-01

## 2023-08-03 PROBLEM — L97.912 CHRONIC ULCER OF RIGHT LEG, WITH FAT LAYER EXPOSED: Status: ACTIVE | Noted: 2023-01-01

## 2023-08-03 PROBLEM — L97.922 CHRONIC ULCER OF LEFT LEG, WITH FAT LAYER EXPOSED: Status: ACTIVE | Noted: 2023-01-01

## 2023-08-03 PROBLEM — Z96.652 HISTORY OF TOTAL LEFT KNEE REPLACEMENT: Status: ACTIVE | Noted: 2023-01-01

## 2023-08-10 NOTE — REASON FOR VISIT
Abdomen , soft, nontender, nondistended , no guarding or rigidity , no masses palpable , normal bowel sounds , Liver and Spleen , no hepatomegaly present , no hepatosplenomegaly , liver nontender , spleen not palpable
[FreeTextEntry1] : LLEG ULCER

## 2023-08-10 NOTE — HISTORY OF PRESENT ILLNESS
[FreeTextEntry1] : [facility] 5/26, left redo knee replacement sacral candida h/o c diff now with yeastlocation buttocks severe  severity S/P ADMIT NO FEVER  timing/duration MONTHS  quality NO BLEED

## 2023-08-10 NOTE — PHYSICAL EXAM
[Wheezing] : wheezing was heard [Normal Rate and Rhythm] : normal rate and rhythm [2+] : left 2+ [Ankle Swelling (On Exam)] : present [Ankle Swelling Bilaterally] : severe [Varicose Veins Of Lower Extremities] : present [] : present [Skin Ulcer] : ulcer [Alert] : alert [Oriented to Person] : oriented to person [Oriented to Place] : oriented to place [Oriented to Time] : oriented to time [Calm] : calm [Please See PDF for Tissue Analytics] : Please See PDF for [employer] Analytics. [JVD] : no jugular venous distention  [de-identified] : NAD OBESE 6/1 194 lb [de-identified] : MAGGIE [de-identified] : NONE [de-identified] : candida [de-identified] : left knee scar

## 2023-08-10 NOTE — ASSESSMENT
[FreeTextEntry1] :  83 YR OLD MALE WITH BILATERAL VENOUS INSUFFICENCY AND CELLULITIS HISTORY, SIGNS OF left knee OVERGROWTH DOROTHY S/P C DIFF buttock perianal on asa  LKR 5/26, left redo knee replacement x2 for infection patella mobile, some fluid - going to see ortho wrap sacral candida h/o c diff datacomplexity- MOD lab, xr, old rec, test resultsreview,, visualize imagecptreview previous  risk- MOD surgery ,ON ASA check left leg with swelling us suppplies ordered foam 4x4, kerlex  hypafix  nursing/woundcare orders  :

## 2023-11-05 PROBLEM — R93.89 ABNORMAL CT OF THE CHEST: Status: ACTIVE | Noted: 2017-05-11

## 2023-11-05 PROBLEM — J45.50 ASTHMA, SEVERE PERSISTENT: Status: ACTIVE | Noted: 2017-08-31

## 2023-11-05 PROBLEM — J44.9 COPD (CHRONIC OBSTRUCTIVE PULMONARY DISEASE): Status: ACTIVE | Noted: 2021-06-16

## 2023-11-05 PROBLEM — G47.33 OSA (OBSTRUCTIVE SLEEP APNEA): Status: ACTIVE | Noted: 2017-06-08

## 2024-01-01 ENCOUNTER — APPOINTMENT (OUTPATIENT)
Dept: PULMONOLOGY | Facility: CLINIC | Age: 84
End: 2024-01-01

## 2024-01-01 ENCOUNTER — TRANSCRIPTION ENCOUNTER (OUTPATIENT)
Age: 84
End: 2024-01-01

## 2024-01-01 ENCOUNTER — INPATIENT (INPATIENT)
Facility: HOSPITAL | Age: 84
LOS: 7 days | Discharge: NURSING, REHAB OR LONG-TERM CARE | DRG: 682 | End: 2024-04-26
Attending: INTERNAL MEDICINE | Admitting: INTERNAL MEDICINE
Payer: COMMERCIAL

## 2024-01-01 ENCOUNTER — RESULT REVIEW (OUTPATIENT)
Age: 84
End: 2024-01-01

## 2024-01-01 VITALS
OXYGEN SATURATION: 94 % | WEIGHT: 190.04 LBS | DIASTOLIC BLOOD PRESSURE: 73 MMHG | HEART RATE: 80 BPM | TEMPERATURE: 98 F | SYSTOLIC BLOOD PRESSURE: 124 MMHG | RESPIRATION RATE: 20 BRPM | HEIGHT: 72 IN

## 2024-01-01 VITALS
DIASTOLIC BLOOD PRESSURE: 63 MMHG | TEMPERATURE: 98 F | OXYGEN SATURATION: 95 % | SYSTOLIC BLOOD PRESSURE: 101 MMHG | HEART RATE: 65 BPM | RESPIRATION RATE: 18 BRPM

## 2024-01-01 DIAGNOSIS — E78.5 HYPERLIPIDEMIA, UNSPECIFIED: ICD-10-CM

## 2024-01-01 DIAGNOSIS — C91.10 CHRONIC LYMPHOCYTIC LEUKEMIA OF B-CELL TYPE NOT HAVING ACHIEVED REMISSION: ICD-10-CM

## 2024-01-01 DIAGNOSIS — N17.9 ACUTE KIDNEY FAILURE, UNSPECIFIED: ICD-10-CM

## 2024-01-01 DIAGNOSIS — I10 ESSENTIAL (PRIMARY) HYPERTENSION: ICD-10-CM

## 2024-01-01 DIAGNOSIS — E03.9 HYPOTHYROIDISM, UNSPECIFIED: ICD-10-CM

## 2024-01-01 LAB
% ALBUMIN: 54.3 % — SIGNIFICANT CHANGE UP
% ALPHA 1: 7.5 % — SIGNIFICANT CHANGE UP
% ALPHA 2: 14.1 % — SIGNIFICANT CHANGE UP
% BETA: 12.3 % — SIGNIFICANT CHANGE UP
% GAMMA: 11.8 % — SIGNIFICANT CHANGE UP
-  AMIKACIN: SIGNIFICANT CHANGE UP
-  AZTREONAM: SIGNIFICANT CHANGE UP
-  CEFEPIME: SIGNIFICANT CHANGE UP
-  CEFTAZIDIME: SIGNIFICANT CHANGE UP
-  CIPROFLOXACIN: SIGNIFICANT CHANGE UP
-  IMIPENEM: SIGNIFICANT CHANGE UP
-  LEVOFLOXACIN: SIGNIFICANT CHANGE UP
-  MEROPENEM: SIGNIFICANT CHANGE UP
-  PIPERACILLIN/TAZOBACTAM: SIGNIFICANT CHANGE UP
24R-OH-CALCIDIOL SERPL-MCNC: 29.4 NG/ML — LOW (ref 30–80)
AGGLUTINATION: PRESENT — SIGNIFICANT CHANGE UP
ALBUMIN SERPL ELPH-MCNC: 2.7 G/DL — LOW (ref 3.6–5.5)
ALBUMIN SERPL ELPH-MCNC: 3.1 G/DL — LOW (ref 3.3–5)
ALBUMIN SERPL ELPH-MCNC: 3.2 G/DL — LOW (ref 3.3–5)
ALBUMIN/GLOB SERPL ELPH: 1.2 RATIO — SIGNIFICANT CHANGE UP
ALP SERPL-CCNC: 191 U/L — HIGH (ref 40–120)
ALP SERPL-CCNC: 236 U/L — HIGH (ref 40–120)
ALPHA1 GLOB SERPL ELPH-MCNC: 0.4 G/DL — SIGNIFICANT CHANGE UP (ref 0.1–0.4)
ALPHA2 GLOB SERPL ELPH-MCNC: 0.7 G/DL — SIGNIFICANT CHANGE UP (ref 0.5–1)
ALT FLD-CCNC: 21 U/L — SIGNIFICANT CHANGE UP (ref 10–45)
ALT FLD-CCNC: 24 U/L — SIGNIFICANT CHANGE UP (ref 10–45)
ANION GAP SERPL CALC-SCNC: 10 MMOL/L — SIGNIFICANT CHANGE UP (ref 5–17)
ANION GAP SERPL CALC-SCNC: 11 MMOL/L — SIGNIFICANT CHANGE UP (ref 5–17)
ANION GAP SERPL CALC-SCNC: 11 MMOL/L — SIGNIFICANT CHANGE UP (ref 5–17)
ANION GAP SERPL CALC-SCNC: 12 MMOL/L — SIGNIFICANT CHANGE UP (ref 5–17)
ANION GAP SERPL CALC-SCNC: 12 MMOL/L — SIGNIFICANT CHANGE UP (ref 5–17)
ANION GAP SERPL CALC-SCNC: 13 MMOL/L — SIGNIFICANT CHANGE UP (ref 5–17)
ANION GAP SERPL CALC-SCNC: 9 MMOL/L — SIGNIFICANT CHANGE UP (ref 5–17)
ANISOCYTOSIS BLD QL: SLIGHT — SIGNIFICANT CHANGE UP
ANISOCYTOSIS BLD QL: SLIGHT — SIGNIFICANT CHANGE UP
AST SERPL-CCNC: 36 U/L — SIGNIFICANT CHANGE UP (ref 10–40)
AST SERPL-CCNC: 39 U/L — SIGNIFICANT CHANGE UP (ref 10–40)
B-GLOBULIN SERPL ELPH-MCNC: 0.6 G/DL — SIGNIFICANT CHANGE UP (ref 0.5–1)
BASOPHILS # BLD AUTO: 0 K/UL — SIGNIFICANT CHANGE UP (ref 0–0.2)
BASOPHILS # BLD AUTO: 0 K/UL — SIGNIFICANT CHANGE UP (ref 0–0.2)
BASOPHILS # BLD AUTO: 0.03 K/UL — SIGNIFICANT CHANGE UP (ref 0–0.2)
BASOPHILS # BLD AUTO: 0.54 K/UL — HIGH (ref 0–0.2)
BASOPHILS NFR BLD AUTO: 0 % — SIGNIFICANT CHANGE UP (ref 0–2)
BASOPHILS NFR BLD AUTO: 0 % — SIGNIFICANT CHANGE UP (ref 0–2)
BASOPHILS NFR BLD AUTO: 0.1 % — SIGNIFICANT CHANGE UP (ref 0–2)
BASOPHILS NFR BLD AUTO: 0.9 % — SIGNIFICANT CHANGE UP (ref 0–2)
BILIRUB SERPL-MCNC: 0.4 MG/DL — SIGNIFICANT CHANGE UP (ref 0.2–1.2)
BILIRUB SERPL-MCNC: 0.5 MG/DL — SIGNIFICANT CHANGE UP (ref 0.2–1.2)
BLD GP AB SCN SERPL QL: NEGATIVE — SIGNIFICANT CHANGE UP
BUN SERPL-MCNC: 20 MG/DL — SIGNIFICANT CHANGE UP (ref 7–23)
BUN SERPL-MCNC: 22 MG/DL — SIGNIFICANT CHANGE UP (ref 7–23)
BUN SERPL-MCNC: 28 MG/DL — HIGH (ref 7–23)
BUN SERPL-MCNC: 31 MG/DL — HIGH (ref 7–23)
BUN SERPL-MCNC: 42 MG/DL — HIGH (ref 7–23)
BUN SERPL-MCNC: 43 MG/DL — HIGH (ref 7–23)
BUN SERPL-MCNC: 47 MG/DL — HIGH (ref 7–23)
CA-I BLD-SCNC: 1.44 MMOL/L — HIGH (ref 1.15–1.33)
CALCIUM SERPL-MCNC: 10.5 MG/DL — SIGNIFICANT CHANGE UP (ref 8.4–10.5)
CALCIUM SERPL-MCNC: 10.6 MG/DL — HIGH (ref 8.4–10.5)
CALCIUM SERPL-MCNC: 10.8 MG/DL — HIGH (ref 8.4–10.5)
CALCIUM SERPL-MCNC: 10.9 MG/DL — HIGH (ref 8.4–10.5)
CALCIUM SERPL-MCNC: 11.8 MG/DL — HIGH (ref 8.4–10.5)
CHLORIDE SERPL-SCNC: 100 MMOL/L — SIGNIFICANT CHANGE UP (ref 96–108)
CHLORIDE SERPL-SCNC: 102 MMOL/L — SIGNIFICANT CHANGE UP (ref 96–108)
CHLORIDE SERPL-SCNC: 103 MMOL/L — SIGNIFICANT CHANGE UP (ref 96–108)
CHLORIDE SERPL-SCNC: 103 MMOL/L — SIGNIFICANT CHANGE UP (ref 96–108)
CHLORIDE SERPL-SCNC: 104 MMOL/L — SIGNIFICANT CHANGE UP (ref 96–108)
CHLORIDE SERPL-SCNC: 97 MMOL/L — SIGNIFICANT CHANGE UP (ref 96–108)
CHLORIDE SERPL-SCNC: 98 MMOL/L — SIGNIFICANT CHANGE UP (ref 96–108)
CO2 SERPL-SCNC: 22 MMOL/L — SIGNIFICANT CHANGE UP (ref 22–31)
CO2 SERPL-SCNC: 23 MMOL/L — SIGNIFICANT CHANGE UP (ref 22–31)
CO2 SERPL-SCNC: 25 MMOL/L — SIGNIFICANT CHANGE UP (ref 22–31)
CO2 SERPL-SCNC: 27 MMOL/L — SIGNIFICANT CHANGE UP (ref 22–31)
CO2 SERPL-SCNC: 29 MMOL/L — SIGNIFICANT CHANGE UP (ref 22–31)
CO2 SERPL-SCNC: 31 MMOL/L — SIGNIFICANT CHANGE UP (ref 22–31)
CO2 SERPL-SCNC: 31 MMOL/L — SIGNIFICANT CHANGE UP (ref 22–31)
CREAT ?TM UR-MCNC: 76 MG/DL — SIGNIFICANT CHANGE UP
CREAT SERPL-MCNC: 1.48 MG/DL — HIGH (ref 0.5–1.3)
CREAT SERPL-MCNC: 1.54 MG/DL — HIGH (ref 0.5–1.3)
CREAT SERPL-MCNC: 1.56 MG/DL — HIGH (ref 0.5–1.3)
CREAT SERPL-MCNC: 1.56 MG/DL — HIGH (ref 0.5–1.3)
CREAT SERPL-MCNC: 1.58 MG/DL — HIGH (ref 0.5–1.3)
CREAT SERPL-MCNC: 1.67 MG/DL — HIGH (ref 0.5–1.3)
CREAT SERPL-MCNC: 1.73 MG/DL — HIGH (ref 0.5–1.3)
CULTURE RESULTS: ABNORMAL
CULTURE RESULTS: SIGNIFICANT CHANGE UP
CULTURE RESULTS: SIGNIFICANT CHANGE UP
DACRYOCYTES BLD QL SMEAR: SLIGHT — SIGNIFICANT CHANGE UP
EGFR: 39 ML/MIN/1.73M2 — LOW
EGFR: 40 ML/MIN/1.73M2 — LOW
EGFR: 43 ML/MIN/1.73M2 — LOW
EGFR: 44 ML/MIN/1.73M2 — LOW
EGFR: 47 ML/MIN/1.73M2 — LOW
ELLIPTOCYTES BLD QL SMEAR: SLIGHT — SIGNIFICANT CHANGE UP
EOSINOPHIL # BLD AUTO: 0.05 K/UL — SIGNIFICANT CHANGE UP (ref 0–0.5)
EOSINOPHIL # BLD AUTO: 0.54 K/UL — HIGH (ref 0–0.5)
EOSINOPHIL # BLD AUTO: 0.69 K/UL — HIGH (ref 0–0.5)
EOSINOPHIL # BLD AUTO: 1.25 K/UL — HIGH (ref 0–0.5)
EOSINOPHIL NFR BLD AUTO: 0.1 % — SIGNIFICANT CHANGE UP (ref 0–6)
EOSINOPHIL NFR BLD AUTO: 0.9 % — SIGNIFICANT CHANGE UP (ref 0–6)
EOSINOPHIL NFR BLD AUTO: 0.9 % — SIGNIFICANT CHANGE UP (ref 0–6)
EOSINOPHIL NFR BLD AUTO: 1.7 % — SIGNIFICANT CHANGE UP (ref 0–6)
FOLATE SERPL-MCNC: 19.8 NG/ML — SIGNIFICANT CHANGE UP
GAMMA GLOBULIN: 0.6 G/DL — SIGNIFICANT CHANGE UP (ref 0.6–1.6)
GLUCOSE SERPL-MCNC: 104 MG/DL — HIGH (ref 70–99)
GLUCOSE SERPL-MCNC: 107 MG/DL — HIGH (ref 70–99)
GLUCOSE SERPL-MCNC: 67 MG/DL — LOW (ref 70–99)
GLUCOSE SERPL-MCNC: 74 MG/DL — SIGNIFICANT CHANGE UP (ref 70–99)
GLUCOSE SERPL-MCNC: 82 MG/DL — SIGNIFICANT CHANGE UP (ref 70–99)
GLUCOSE SERPL-MCNC: 90 MG/DL — SIGNIFICANT CHANGE UP (ref 70–99)
GLUCOSE SERPL-MCNC: 97 MG/DL — SIGNIFICANT CHANGE UP (ref 70–99)
HAPTOGLOB SERPL-MCNC: 217 MG/DL — HIGH (ref 34–200)
HCT VFR BLD CALC: 22.1 % — LOW (ref 39–50)
HCT VFR BLD CALC: 24.8 % — LOW (ref 39–50)
HCT VFR BLD CALC: 26.1 % — LOW (ref 39–50)
HCT VFR BLD CALC: 27.2 % — LOW (ref 39–50)
HCT VFR BLD CALC: 27.4 % — LOW (ref 39–50)
HCT VFR BLD CALC: 29.3 % — LOW (ref 39–50)
HCT VFR BLD CALC: 29.6 % — LOW (ref 39–50)
HGB BLD-MCNC: 7.7 G/DL — LOW (ref 13–17)
HGB BLD-MCNC: 8.5 G/DL — LOW (ref 13–17)
HGB BLD-MCNC: 8.6 G/DL — LOW (ref 13–17)
HGB BLD-MCNC: 8.8 G/DL — LOW (ref 13–17)
HGB BLD-MCNC: 8.9 G/DL — LOW (ref 13–17)
IMM GRANULOCYTES NFR BLD AUTO: 1 % — HIGH (ref 0–0.9)
INTERPRETATION SERPL IFE-IMP: SIGNIFICANT CHANGE UP
KAPPA LC SER QL IFE: 4.7 MG/DL — HIGH (ref 0.33–1.94)
KAPPA/LAMBDA FREE LIGHT CHAIN RATIO, SERUM: 1.43 RATIO — SIGNIFICANT CHANGE UP (ref 0.26–1.65)
LAMBDA LC SER QL IFE: 3.29 MG/DL — HIGH (ref 0.57–2.63)
LDH SERPL L TO P-CCNC: 275 U/L — HIGH (ref 50–242)
LYMPHOCYTES # BLD AUTO: 34.92 K/UL — HIGH (ref 1–3.3)
LYMPHOCYTES # BLD AUTO: 46.06 K/UL — HIGH (ref 1–3.3)
LYMPHOCYTES # BLD AUTO: 59.75 K/UL — HIGH (ref 1–3.3)
LYMPHOCYTES # BLD AUTO: 65.6 % — HIGH (ref 13–44)
LYMPHOCYTES # BLD AUTO: 69.02 K/UL — HIGH (ref 1–3.3)
LYMPHOCYTES # BLD AUTO: 76.3 % — HIGH (ref 13–44)
LYMPHOCYTES # BLD AUTO: 81.4 % — HIGH (ref 13–44)
LYMPHOCYTES # BLD AUTO: 89.6 % — HIGH (ref 13–44)
MACROCYTES BLD QL: SLIGHT — SIGNIFICANT CHANGE UP
MACROCYTES BLD QL: SLIGHT — SIGNIFICANT CHANGE UP
MAGNESIUM SERPL-MCNC: 2.3 MG/DL — SIGNIFICANT CHANGE UP (ref 1.6–2.6)
MAGNESIUM SERPL-MCNC: 2.4 MG/DL — SIGNIFICANT CHANGE UP (ref 1.6–2.6)
MAGNESIUM SERPL-MCNC: 2.6 MG/DL — SIGNIFICANT CHANGE UP (ref 1.6–2.6)
MAGNESIUM SERPL-MCNC: 2.7 MG/DL — HIGH (ref 1.6–2.6)
MANUAL SMEAR VERIFICATION: SIGNIFICANT CHANGE UP
MCHC RBC-ENTMCNC: 27.8 PG — SIGNIFICANT CHANGE UP (ref 27–34)
MCHC RBC-ENTMCNC: 28.1 PG — SIGNIFICANT CHANGE UP (ref 27–34)
MCHC RBC-ENTMCNC: 29.4 GM/DL — LOW (ref 32–36)
MCHC RBC-ENTMCNC: 30.1 GM/DL — LOW (ref 32–36)
MCHC RBC-ENTMCNC: 30.8 PG — SIGNIFICANT CHANGE UP (ref 27–34)
MCHC RBC-ENTMCNC: 31.3 GM/DL — LOW (ref 32–36)
MCHC RBC-ENTMCNC: 31.3 PG — SIGNIFICANT CHANGE UP (ref 27–34)
MCHC RBC-ENTMCNC: 32.1 GM/DL — SIGNIFICANT CHANGE UP (ref 32–36)
MCHC RBC-ENTMCNC: 32.6 GM/DL — SIGNIFICANT CHANGE UP (ref 32–36)
MCHC RBC-ENTMCNC: 32.6 PG — SIGNIFICANT CHANGE UP (ref 27–34)
MCHC RBC-ENTMCNC: 34 PG — SIGNIFICANT CHANGE UP (ref 27–34)
MCHC RBC-ENTMCNC: 34.2 PG — HIGH (ref 27–34)
MCHC RBC-ENTMCNC: 34.3 GM/DL — SIGNIFICANT CHANGE UP (ref 32–36)
MCHC RBC-ENTMCNC: 34.8 GM/DL — SIGNIFICANT CHANGE UP (ref 32–36)
MCV RBC AUTO: 100 FL — SIGNIFICANT CHANGE UP (ref 80–100)
MCV RBC AUTO: 100 FL — SIGNIFICANT CHANGE UP (ref 80–100)
MCV RBC AUTO: 93.4 FL — SIGNIFICANT CHANGE UP (ref 80–100)
MCV RBC AUTO: 94.8 FL — SIGNIFICANT CHANGE UP (ref 80–100)
MCV RBC AUTO: 95.8 FL — SIGNIFICANT CHANGE UP (ref 80–100)
MCV RBC AUTO: 98.2 FL — SIGNIFICANT CHANGE UP (ref 80–100)
MCV RBC AUTO: 99.2 FL — SIGNIFICANT CHANGE UP (ref 80–100)
METHOD TYPE: SIGNIFICANT CHANGE UP
MONOCYTES # BLD AUTO: 0 K/UL — SIGNIFICANT CHANGE UP (ref 0–0.9)
MONOCYTES # BLD AUTO: 0 K/UL — SIGNIFICANT CHANGE UP (ref 0–0.9)
MONOCYTES # BLD AUTO: 1.25 K/UL — HIGH (ref 0–0.9)
MONOCYTES # BLD AUTO: 3.49 K/UL — HIGH (ref 0–0.9)
MONOCYTES NFR BLD AUTO: 0 % — LOW (ref 2–14)
MONOCYTES NFR BLD AUTO: 0 % — LOW (ref 2–14)
MONOCYTES NFR BLD AUTO: 1.7 % — LOW (ref 2–14)
MONOCYTES NFR BLD AUTO: 6.6 % — SIGNIFICANT CHANGE UP (ref 2–14)
NEUTROPHILS # BLD AUTO: 10.56 K/UL — HIGH (ref 1.8–7.4)
NEUTROPHILS # BLD AUTO: 11.16 K/UL — HIGH (ref 1.8–7.4)
NEUTROPHILS # BLD AUTO: 14.21 K/UL — HIGH (ref 1.8–7.4)
NEUTROPHILS # BLD AUTO: 7.32 K/UL — SIGNIFICANT CHANGE UP (ref 1.8–7.4)
NEUTROPHILS NFR BLD AUTO: 15.2 % — LOW (ref 43–77)
NEUTROPHILS NFR BLD AUTO: 17.5 % — LOW (ref 43–77)
NEUTROPHILS NFR BLD AUTO: 26.6 % — LOW (ref 43–77)
NEUTROPHILS NFR BLD AUTO: 9.5 % — LOW (ref 43–77)
NRBC # BLD: 0 /100 WBCS — SIGNIFICANT CHANGE UP (ref 0–0)
NT-PROBNP SERPL-SCNC: 414 PG/ML — HIGH (ref 0–300)
ORGANISM # SPEC MICROSCOPIC CNT: ABNORMAL
ORGANISM # SPEC MICROSCOPIC CNT: ABNORMAL
OVALOCYTES BLD QL SMEAR: SLIGHT — SIGNIFICANT CHANGE UP
PHOSPHATE SERPL-MCNC: 2.8 MG/DL — SIGNIFICANT CHANGE UP (ref 2.5–4.5)
PLAT MORPH BLD: ABNORMAL
PLAT MORPH BLD: NORMAL — SIGNIFICANT CHANGE UP
PLAT MORPH BLD: NORMAL — SIGNIFICANT CHANGE UP
PLATELET # BLD AUTO: 158 K/UL — SIGNIFICANT CHANGE UP (ref 150–400)
PLATELET # BLD AUTO: 163 K/UL — SIGNIFICANT CHANGE UP (ref 150–400)
PLATELET # BLD AUTO: 168 K/UL — SIGNIFICANT CHANGE UP (ref 150–400)
PLATELET # BLD AUTO: 179 K/UL — SIGNIFICANT CHANGE UP (ref 150–400)
PLATELET # BLD AUTO: 181 K/UL — SIGNIFICANT CHANGE UP (ref 150–400)
PLATELET # BLD AUTO: 181 K/UL — SIGNIFICANT CHANGE UP (ref 150–400)
PLATELET # BLD AUTO: 183 K/UL — SIGNIFICANT CHANGE UP (ref 150–400)
POIKILOCYTOSIS BLD QL AUTO: SLIGHT — SIGNIFICANT CHANGE UP
POIKILOCYTOSIS BLD QL AUTO: SLIGHT — SIGNIFICANT CHANGE UP
POLYCHROMASIA BLD QL SMEAR: SLIGHT — SIGNIFICANT CHANGE UP
POTASSIUM SERPL-MCNC: 3.7 MMOL/L — SIGNIFICANT CHANGE UP (ref 3.5–5.3)
POTASSIUM SERPL-MCNC: 4 MMOL/L — SIGNIFICANT CHANGE UP (ref 3.5–5.3)
POTASSIUM SERPL-MCNC: 4 MMOL/L — SIGNIFICANT CHANGE UP (ref 3.5–5.3)
POTASSIUM SERPL-MCNC: 4.3 MMOL/L — SIGNIFICANT CHANGE UP (ref 3.5–5.3)
POTASSIUM SERPL-MCNC: 4.4 MMOL/L — SIGNIFICANT CHANGE UP (ref 3.5–5.3)
POTASSIUM SERPL-MCNC: 4.4 MMOL/L — SIGNIFICANT CHANGE UP (ref 3.5–5.3)
POTASSIUM SERPL-MCNC: 5.5 MMOL/L — HIGH (ref 3.5–5.3)
POTASSIUM SERPL-SCNC: 3.7 MMOL/L — SIGNIFICANT CHANGE UP (ref 3.5–5.3)
POTASSIUM SERPL-SCNC: 4 MMOL/L — SIGNIFICANT CHANGE UP (ref 3.5–5.3)
POTASSIUM SERPL-SCNC: 4 MMOL/L — SIGNIFICANT CHANGE UP (ref 3.5–5.3)
POTASSIUM SERPL-SCNC: 4.3 MMOL/L — SIGNIFICANT CHANGE UP (ref 3.5–5.3)
POTASSIUM SERPL-SCNC: 4.4 MMOL/L — SIGNIFICANT CHANGE UP (ref 3.5–5.3)
POTASSIUM SERPL-SCNC: 4.4 MMOL/L — SIGNIFICANT CHANGE UP (ref 3.5–5.3)
POTASSIUM SERPL-SCNC: 5.5 MMOL/L — HIGH (ref 3.5–5.3)
POTASSIUM UR-SCNC: 48 MMOL/L — SIGNIFICANT CHANGE UP
PROT ?TM UR-MCNC: 22 MG/DL — HIGH (ref 0–12)
PROT PATTERN SERPL ELPH-IMP: SIGNIFICANT CHANGE UP
PROT SERPL-MCNC: 5 G/DL — LOW (ref 6–8.3)
PROT SERPL-MCNC: 5 G/DL — LOW (ref 6–8.3)
PROT SERPL-MCNC: 5.7 G/DL — LOW (ref 6–8.3)
PROT SERPL-MCNC: 6.1 G/DL — SIGNIFICANT CHANGE UP (ref 6–8.3)
PROT/CREAT UR-RTO: 0.3 RATIO — HIGH (ref 0–0.2)
PTH RELATED PROT SERPL-MCNC: <2 PMOL/L — SIGNIFICANT CHANGE UP
PTH-INTACT FLD-MCNC: 17 PG/ML — SIGNIFICANT CHANGE UP (ref 15–65)
RBC # BLD: 2.25 M/UL — LOW (ref 4.2–5.8)
RBC # BLD: 2.5 M/UL — LOW (ref 4.2–5.8)
RBC # BLD: 2.61 M/UL — LOW (ref 4.2–5.8)
RBC # BLD: 2.72 M/UL — LOW (ref 4.2–5.8)
RBC # BLD: 2.86 M/UL — LOW (ref 4.2–5.8)
RBC # BLD: 3.09 M/UL — LOW (ref 4.2–5.8)
RBC # BLD: 3.17 M/UL — LOW (ref 4.2–5.8)
RBC # FLD: 19.9 % — HIGH (ref 10.3–14.5)
RBC # FLD: 20.9 % — HIGH (ref 10.3–14.5)
RBC # FLD: 22.6 % — HIGH (ref 10.3–14.5)
RBC # FLD: 24 % — HIGH (ref 10.3–14.5)
RBC # FLD: 24.2 % — HIGH (ref 10.3–14.5)
RBC # FLD: 26.1 % — HIGH (ref 10.3–14.5)
RBC # FLD: 26.4 % — HIGH (ref 10.3–14.5)
RBC BLD AUTO: ABNORMAL
RBC BLD AUTO: ABNORMAL
RBC BLD AUTO: SIGNIFICANT CHANGE UP
RH IG SCN BLD-IMP: POSITIVE — SIGNIFICANT CHANGE UP
SARS-COV-2 RNA SPEC QL NAA+PROBE: SIGNIFICANT CHANGE UP
SCHISTOCYTES BLD QL AUTO: SLIGHT — SIGNIFICANT CHANGE UP
SMUDGE CELLS # BLD: PRESENT — SIGNIFICANT CHANGE UP
SODIUM SERPL-SCNC: 135 MMOL/L — SIGNIFICANT CHANGE UP (ref 135–145)
SODIUM SERPL-SCNC: 137 MMOL/L — SIGNIFICANT CHANGE UP (ref 135–145)
SODIUM SERPL-SCNC: 137 MMOL/L — SIGNIFICANT CHANGE UP (ref 135–145)
SODIUM SERPL-SCNC: 139 MMOL/L — SIGNIFICANT CHANGE UP (ref 135–145)
SODIUM SERPL-SCNC: 140 MMOL/L — SIGNIFICANT CHANGE UP (ref 135–145)
SODIUM SERPL-SCNC: 142 MMOL/L — SIGNIFICANT CHANGE UP (ref 135–145)
SODIUM SERPL-SCNC: 143 MMOL/L — SIGNIFICANT CHANGE UP (ref 135–145)
SODIUM UR-SCNC: 14 MMOL/L — SIGNIFICANT CHANGE UP
SPECIMEN SOURCE: SIGNIFICANT CHANGE UP
T4 FREE SERPL-MCNC: 0.3 NG/DL — LOW (ref 0.9–1.8)
T4 FREE SERPL-MCNC: 0.5 NG/DL — LOW (ref 0.9–1.8)
THYROPEROXIDASE AB SERPL-ACNC: <10 IU/ML — SIGNIFICANT CHANGE UP
TSH SERPL-MCNC: 78.6 UIU/ML — HIGH (ref 0.27–4.2)
TSH SERPL-MCNC: 90.3 UIU/ML — HIGH (ref 0.27–4.2)
UUN UR-MCNC: 825 MG/DL — SIGNIFICANT CHANGE UP
VARIANT LYMPHS # BLD: 4.4 % — SIGNIFICANT CHANGE UP (ref 0–6)
VIT B12 SERPL-MCNC: 1573 PG/ML — HIGH (ref 232–1245)
VIT D25+D1,25 OH+D1,25 PNL SERPL-MCNC: 62.9 PG/ML — SIGNIFICANT CHANGE UP (ref 19.9–79.3)
WBC # BLD: 52.13 K/UL — CRITICAL HIGH (ref 3.8–10.5)
WBC # BLD: 52.18 K/UL — CRITICAL HIGH (ref 3.8–10.5)
WBC # BLD: 53.23 K/UL — CRITICAL HIGH (ref 3.8–10.5)
WBC # BLD: 60.37 K/UL — CRITICAL HIGH (ref 3.8–10.5)
WBC # BLD: 73.4 K/UL — CRITICAL HIGH (ref 3.8–10.5)
WBC # BLD: 77.03 K/UL — CRITICAL HIGH (ref 3.8–10.5)
WBC # BLD: 82.54 K/UL — CRITICAL HIGH (ref 3.8–10.5)
WBC # FLD AUTO: 52.13 K/UL — CRITICAL HIGH (ref 3.8–10.5)
WBC # FLD AUTO: 52.18 K/UL — CRITICAL HIGH (ref 3.8–10.5)
WBC # FLD AUTO: 53.23 K/UL — CRITICAL HIGH (ref 3.8–10.5)
WBC # FLD AUTO: 60.37 K/UL — CRITICAL HIGH (ref 3.8–10.5)
WBC # FLD AUTO: 73.4 K/UL — CRITICAL HIGH (ref 3.8–10.5)
WBC # FLD AUTO: 77.03 K/UL — CRITICAL HIGH (ref 3.8–10.5)
WBC # FLD AUTO: 82.54 K/UL — CRITICAL HIGH (ref 3.8–10.5)

## 2024-01-01 PROCEDURE — 93306 TTE W/DOPPLER COMPLETE: CPT | Mod: 26

## 2024-01-01 PROCEDURE — 84155 ASSAY OF PROTEIN SERUM: CPT

## 2024-01-01 PROCEDURE — 85025 COMPLETE CBC W/AUTO DIFF WBC: CPT

## 2024-01-01 PROCEDURE — 82652 VIT D 1 25-DIHYDROXY: CPT

## 2024-01-01 PROCEDURE — 83880 ASSAY OF NATRIURETIC PEPTIDE: CPT

## 2024-01-01 PROCEDURE — 82310 ASSAY OF CALCIUM: CPT

## 2024-01-01 PROCEDURE — 87186 SC STD MICRODIL/AGAR DIL: CPT

## 2024-01-01 PROCEDURE — 93306 TTE W/DOPPLER COMPLETE: CPT

## 2024-01-01 PROCEDURE — 83010 ASSAY OF HAPTOGLOBIN QUANT: CPT

## 2024-01-01 PROCEDURE — 93970 EXTREMITY STUDY: CPT | Mod: 26

## 2024-01-01 PROCEDURE — 93970 EXTREMITY STUDY: CPT

## 2024-01-01 PROCEDURE — 99285 EMERGENCY DEPT VISIT HI MDM: CPT

## 2024-01-01 PROCEDURE — 83615 LACTATE (LD) (LDH) ENZYME: CPT

## 2024-01-01 PROCEDURE — 71045 X-RAY EXAM CHEST 1 VIEW: CPT

## 2024-01-01 PROCEDURE — 36415 COLL VENOUS BLD VENIPUNCTURE: CPT

## 2024-01-01 PROCEDURE — 87077 CULTURE AEROBIC IDENTIFY: CPT

## 2024-01-01 PROCEDURE — 76770 US EXAM ABDO BACK WALL COMP: CPT | Mod: 26

## 2024-01-01 PROCEDURE — 97530 THERAPEUTIC ACTIVITIES: CPT

## 2024-01-01 PROCEDURE — 83735 ASSAY OF MAGNESIUM: CPT

## 2024-01-01 PROCEDURE — 84100 ASSAY OF PHOSPHORUS: CPT

## 2024-01-01 PROCEDURE — 86900 BLOOD TYPING SEROLOGIC ABO: CPT

## 2024-01-01 PROCEDURE — 86334 IMMUNOFIX E-PHORESIS SERUM: CPT

## 2024-01-01 PROCEDURE — 87040 BLOOD CULTURE FOR BACTERIA: CPT

## 2024-01-01 PROCEDURE — 83970 ASSAY OF PARATHORMONE: CPT

## 2024-01-01 PROCEDURE — 84439 ASSAY OF FREE THYROXINE: CPT

## 2024-01-01 PROCEDURE — 82330 ASSAY OF CALCIUM: CPT

## 2024-01-01 PROCEDURE — 87086 URINE CULTURE/COLONY COUNT: CPT

## 2024-01-01 PROCEDURE — 80053 COMPREHEN METABOLIC PANEL: CPT

## 2024-01-01 PROCEDURE — 84443 ASSAY THYROID STIM HORMONE: CPT

## 2024-01-01 PROCEDURE — 71045 X-RAY EXAM CHEST 1 VIEW: CPT | Mod: 26

## 2024-01-01 PROCEDURE — 85027 COMPLETE CBC AUTOMATED: CPT

## 2024-01-01 PROCEDURE — 80048 BASIC METABOLIC PNL TOTAL CA: CPT

## 2024-01-01 PROCEDURE — 84165 PROTEIN E-PHORESIS SERUM: CPT

## 2024-01-01 PROCEDURE — 86850 RBC ANTIBODY SCREEN: CPT

## 2024-01-01 PROCEDURE — 82607 VITAMIN B-12: CPT

## 2024-01-01 PROCEDURE — 86376 MICROSOMAL ANTIBODY EACH: CPT

## 2024-01-01 PROCEDURE — 86901 BLOOD TYPING SEROLOGIC RH(D): CPT

## 2024-01-01 PROCEDURE — 76770 US EXAM ABDO BACK WALL COMP: CPT

## 2024-01-01 PROCEDURE — 82306 VITAMIN D 25 HYDROXY: CPT

## 2024-01-01 PROCEDURE — 82746 ASSAY OF FOLIC ACID SERUM: CPT

## 2024-01-01 PROCEDURE — 83519 RIA NONANTIBODY: CPT

## 2024-01-01 PROCEDURE — 87635 SARS-COV-2 COVID-19 AMP PRB: CPT

## 2024-01-01 PROCEDURE — 94640 AIRWAY INHALATION TREATMENT: CPT

## 2024-01-01 PROCEDURE — 84540 ASSAY OF URINE/UREA-N: CPT

## 2024-01-01 PROCEDURE — 97161 PT EVAL LOW COMPLEX 20 MIN: CPT

## 2024-01-01 PROCEDURE — 82570 ASSAY OF URINE CREATININE: CPT

## 2024-01-01 PROCEDURE — 84156 ASSAY OF PROTEIN URINE: CPT

## 2024-01-01 PROCEDURE — 84133 ASSAY OF URINE POTASSIUM: CPT

## 2024-01-01 PROCEDURE — 84300 ASSAY OF URINE SODIUM: CPT

## 2024-01-01 PROCEDURE — 97116 GAIT TRAINING THERAPY: CPT

## 2024-01-01 PROCEDURE — 83521 IG LIGHT CHAINS FREE EACH: CPT

## 2024-01-01 RX ORDER — MELOXICAM 15 MG/1
1 TABLET ORAL
Refills: 0 | DISCHARGE

## 2024-01-01 RX ORDER — LEVOTHYROXINE SODIUM 125 MCG
50 TABLET ORAL AT BEDTIME
Refills: 0 | Status: COMPLETED | OUTPATIENT
Start: 2024-01-01 | End: 2024-01-01

## 2024-01-01 RX ORDER — LEVOTHYROXINE SODIUM 125 MCG
100 TABLET ORAL DAILY
Refills: 0 | Status: DISCONTINUED | OUTPATIENT
Start: 2024-01-01 | End: 2024-01-01

## 2024-01-01 RX ORDER — TRAZODONE HCL 50 MG
1 TABLET ORAL
Qty: 0 | Refills: 0 | DISCHARGE
Start: 2024-01-01

## 2024-01-01 RX ORDER — ACETAMINOPHEN 500 MG
2 TABLET ORAL
Qty: 0 | Refills: 0 | DISCHARGE
Start: 2024-01-01

## 2024-01-01 RX ORDER — FUROSEMIDE 40 MG
40 TABLET ORAL DAILY
Refills: 0 | Status: DISCONTINUED | OUTPATIENT
Start: 2024-01-01 | End: 2024-01-01

## 2024-01-01 RX ORDER — BUDESONIDE, MICRONIZED 100 %
0.5 POWDER (GRAM) MISCELLANEOUS
Refills: 0 | Status: DISCONTINUED | OUTPATIENT
Start: 2024-01-01 | End: 2024-01-01

## 2024-01-01 RX ORDER — ACETAMINOPHEN 500 MG
2 TABLET ORAL
Refills: 0 | DISCHARGE

## 2024-01-01 RX ORDER — SODIUM ZIRCONIUM CYCLOSILICATE 10 G/10G
10 POWDER, FOR SUSPENSION ORAL ONCE
Refills: 0 | Status: COMPLETED | OUTPATIENT
Start: 2024-01-01 | End: 2024-01-01

## 2024-01-01 RX ORDER — ACETAMINOPHEN 500 MG
650 TABLET ORAL EVERY 6 HOURS
Refills: 0 | Status: DISCONTINUED | OUTPATIENT
Start: 2024-01-01 | End: 2024-01-01

## 2024-01-01 RX ORDER — PIPERACILLIN AND TAZOBACTAM 4; .5 G/20ML; G/20ML
3.38 INJECTION, POWDER, LYOPHILIZED, FOR SOLUTION INTRAVENOUS ONCE
Refills: 0 | Status: COMPLETED | OUTPATIENT
Start: 2024-01-01 | End: 2024-01-01

## 2024-01-01 RX ORDER — LEVOTHYROXINE SODIUM 125 MCG
1 TABLET ORAL
Qty: 0 | Refills: 0 | DISCHARGE
Start: 2024-01-01

## 2024-01-01 RX ORDER — IPRATROPIUM/ALBUTEROL SULFATE 18-103MCG
3 AEROSOL WITH ADAPTER (GRAM) INHALATION
Refills: 0 | DISCHARGE

## 2024-01-01 RX ORDER — TRAZODONE HCL 50 MG
50 TABLET ORAL AT BEDTIME
Refills: 0 | Status: DISCONTINUED | OUTPATIENT
Start: 2024-01-01 | End: 2024-01-01

## 2024-01-01 RX ORDER — TRAZODONE HCL 50 MG
1 TABLET ORAL
Refills: 0 | DISCHARGE

## 2024-01-01 RX ORDER — LEVOTHYROXINE SODIUM 125 MCG
50 TABLET ORAL
Refills: 0 | Status: DISCONTINUED | OUTPATIENT
Start: 2024-01-01 | End: 2024-01-01

## 2024-01-01 RX ORDER — FAMOTIDINE 10 MG/ML
20 INJECTION INTRAVENOUS DAILY
Refills: 0 | Status: DISCONTINUED | OUTPATIENT
Start: 2024-01-01 | End: 2024-01-01

## 2024-01-01 RX ORDER — MULTIVIT-MIN/FERROUS GLUCONATE 9 MG/15 ML
1 LIQUID (ML) ORAL DAILY
Refills: 0 | Status: DISCONTINUED | OUTPATIENT
Start: 2024-01-01 | End: 2024-01-01

## 2024-01-01 RX ORDER — FUROSEMIDE 40 MG
1 TABLET ORAL
Refills: 0 | DISCHARGE

## 2024-01-01 RX ORDER — ASPIRIN/CALCIUM CARB/MAGNESIUM 324 MG
325 TABLET ORAL DAILY
Refills: 0 | Status: DISCONTINUED | OUTPATIENT
Start: 2024-01-01 | End: 2024-01-01

## 2024-01-01 RX ORDER — QUETIAPINE FUMARATE 200 MG/1
1 TABLET, FILM COATED ORAL
Qty: 0 | Refills: 0 | DISCHARGE
Start: 2024-01-01

## 2024-01-01 RX ORDER — IPRATROPIUM/ALBUTEROL SULFATE 18-103MCG
3 AEROSOL WITH ADAPTER (GRAM) INHALATION EVERY 6 HOURS
Refills: 0 | Status: DISCONTINUED | OUTPATIENT
Start: 2024-01-01 | End: 2024-01-01

## 2024-01-01 RX ORDER — FOLIC ACID 0.8 MG
1 TABLET ORAL DAILY
Refills: 0 | Status: DISCONTINUED | OUTPATIENT
Start: 2024-01-01 | End: 2024-01-01

## 2024-01-01 RX ORDER — QUETIAPINE FUMARATE 200 MG/1
25 TABLET, FILM COATED ORAL DAILY
Refills: 0 | Status: DISCONTINUED | OUTPATIENT
Start: 2024-01-01 | End: 2024-01-01

## 2024-01-01 RX ORDER — CHLORHEXIDINE GLUCONATE 213 G/1000ML
1 SOLUTION TOPICAL DAILY
Refills: 0 | Status: DISCONTINUED | OUTPATIENT
Start: 2024-01-01 | End: 2024-01-01

## 2024-01-01 RX ORDER — QUETIAPINE FUMARATE 200 MG/1
1 TABLET, FILM COATED ORAL
Refills: 0 | DISCHARGE

## 2024-01-01 RX ORDER — LEVOTHYROXINE SODIUM 125 MCG
25 TABLET ORAL DAILY
Refills: 0 | Status: DISCONTINUED | OUTPATIENT
Start: 2024-01-01 | End: 2024-01-01

## 2024-01-01 RX ORDER — FAMOTIDINE 10 MG/ML
1 INJECTION INTRAVENOUS
Refills: 0 | DISCHARGE

## 2024-01-01 RX ORDER — FAMOTIDINE 10 MG/ML
1 INJECTION INTRAVENOUS
Qty: 0 | Refills: 0 | DISCHARGE
Start: 2024-01-01

## 2024-01-01 RX ORDER — LEVOTHYROXINE SODIUM 125 MCG
50 TABLET ORAL ONCE
Refills: 0 | Status: DISCONTINUED | OUTPATIENT
Start: 2024-01-01 | End: 2024-01-01

## 2024-01-01 RX ORDER — MULTIVIT-MIN/FERROUS GLUCONATE 9 MG/15 ML
1 LIQUID (ML) ORAL
Qty: 0 | Refills: 0 | DISCHARGE
Start: 2024-01-01

## 2024-01-01 RX ORDER — ASPIRIN/CALCIUM CARB/MAGNESIUM 324 MG
1 TABLET ORAL
Qty: 0 | Refills: 0 | DISCHARGE
Start: 2024-01-01

## 2024-01-01 RX ORDER — FOLIC ACID 0.8 MG
1 TABLET ORAL
Qty: 0 | Refills: 0 | DISCHARGE
Start: 2024-01-01

## 2024-01-01 RX ORDER — SODIUM CHLORIDE 9 MG/ML
500 INJECTION INTRAMUSCULAR; INTRAVENOUS; SUBCUTANEOUS ONCE
Refills: 0 | Status: COMPLETED | OUTPATIENT
Start: 2024-01-01 | End: 2024-01-01

## 2024-01-01 RX ORDER — FUROSEMIDE 40 MG
1 TABLET ORAL
Qty: 0 | Refills: 0 | DISCHARGE
Start: 2024-01-01

## 2024-01-01 RX ORDER — HEPARIN SODIUM 5000 [USP'U]/ML
5000 INJECTION INTRAVENOUS; SUBCUTANEOUS EVERY 8 HOURS
Refills: 0 | Status: DISCONTINUED | OUTPATIENT
Start: 2024-01-01 | End: 2024-01-01

## 2024-01-01 RX ORDER — ASPIRIN/CALCIUM CARB/MAGNESIUM 324 MG
1 TABLET ORAL
Refills: 0 | DISCHARGE

## 2024-01-01 RX ORDER — QUETIAPINE FUMARATE 200 MG/1
25 TABLET, FILM COATED ORAL AT BEDTIME
Refills: 0 | Status: DISCONTINUED | OUTPATIENT
Start: 2024-01-01 | End: 2024-01-01

## 2024-01-01 RX ORDER — ACETAMINOPHEN 500 MG
1000 TABLET ORAL ONCE
Refills: 0 | Status: DISCONTINUED | OUTPATIENT
Start: 2024-01-01 | End: 2024-01-01

## 2024-01-01 RX ORDER — PIPERACILLIN AND TAZOBACTAM 4; .5 G/20ML; G/20ML
3.38 INJECTION, POWDER, LYOPHILIZED, FOR SOLUTION INTRAVENOUS EVERY 8 HOURS
Refills: 0 | Status: COMPLETED | OUTPATIENT
Start: 2024-01-01 | End: 2024-01-01

## 2024-01-01 RX ORDER — ALBUTEROL 90 UG/1
2 AEROSOL, METERED ORAL
Refills: 0 | DISCHARGE

## 2024-01-01 RX ORDER — BUDESONIDE, MICRONIZED 100 %
2 POWDER (GRAM) MISCELLANEOUS
Refills: 0 | DISCHARGE

## 2024-01-01 RX ORDER — FORMOTEROL FUMARATE 12 MCG
2 CAPSULE, WITH INHALATION DEVICE INHALATION
Refills: 0 | DISCHARGE

## 2024-01-01 RX ADMIN — Medication 3 MILLILITER(S): at 11:03

## 2024-01-01 RX ADMIN — Medication 0.5 MILLIGRAM(S): at 05:29

## 2024-01-01 RX ADMIN — Medication 1 MILLIGRAM(S): at 11:10

## 2024-01-01 RX ADMIN — Medication 1 TABLET(S): at 11:05

## 2024-01-01 RX ADMIN — Medication 0.5 MILLIGRAM(S): at 05:41

## 2024-01-01 RX ADMIN — Medication 3 MILLILITER(S): at 11:12

## 2024-01-01 RX ADMIN — Medication 3 MILLILITER(S): at 00:18

## 2024-01-01 RX ADMIN — Medication 1 MILLIGRAM(S): at 12:17

## 2024-01-01 RX ADMIN — Medication 40 MILLIGRAM(S): at 06:23

## 2024-01-01 RX ADMIN — FAMOTIDINE 20 MILLIGRAM(S): 10 INJECTION INTRAVENOUS at 11:11

## 2024-01-01 RX ADMIN — Medication 325 MILLIGRAM(S): at 11:03

## 2024-01-01 RX ADMIN — HEPARIN SODIUM 5000 UNIT(S): 5000 INJECTION INTRAVENOUS; SUBCUTANEOUS at 13:37

## 2024-01-01 RX ADMIN — HEPARIN SODIUM 5000 UNIT(S): 5000 INJECTION INTRAVENOUS; SUBCUTANEOUS at 05:03

## 2024-01-01 RX ADMIN — Medication 0.5 MILLIGRAM(S): at 05:40

## 2024-01-01 RX ADMIN — HEPARIN SODIUM 5000 UNIT(S): 5000 INJECTION INTRAVENOUS; SUBCUTANEOUS at 22:41

## 2024-01-01 RX ADMIN — Medication 1 TABLET(S): at 11:03

## 2024-01-01 RX ADMIN — HEPARIN SODIUM 5000 UNIT(S): 5000 INJECTION INTRAVENOUS; SUBCUTANEOUS at 05:34

## 2024-01-01 RX ADMIN — Medication 50 MICROGRAM(S): at 21:11

## 2024-01-01 RX ADMIN — Medication 3 MILLILITER(S): at 23:50

## 2024-01-01 RX ADMIN — Medication 25 MICROGRAM(S): at 05:34

## 2024-01-01 RX ADMIN — Medication 1 TABLET(S): at 12:17

## 2024-01-01 RX ADMIN — PIPERACILLIN AND TAZOBACTAM 25 GRAM(S): 4; .5 INJECTION, POWDER, LYOPHILIZED, FOR SOLUTION INTRAVENOUS at 21:00

## 2024-01-01 RX ADMIN — QUETIAPINE FUMARATE 25 MILLIGRAM(S): 200 TABLET, FILM COATED ORAL at 12:08

## 2024-01-01 RX ADMIN — SODIUM CHLORIDE 250 MILLILITER(S): 9 INJECTION INTRAMUSCULAR; INTRAVENOUS; SUBCUTANEOUS at 15:02

## 2024-01-01 RX ADMIN — Medication 3 MILLILITER(S): at 17:02

## 2024-01-01 RX ADMIN — HEPARIN SODIUM 5000 UNIT(S): 5000 INJECTION INTRAVENOUS; SUBCUTANEOUS at 20:58

## 2024-01-01 RX ADMIN — Medication 50 MILLIGRAM(S): at 21:01

## 2024-01-01 RX ADMIN — Medication 40 MILLIGRAM(S): at 05:34

## 2024-01-01 RX ADMIN — PIPERACILLIN AND TAZOBACTAM 25 GRAM(S): 4; .5 INJECTION, POWDER, LYOPHILIZED, FOR SOLUTION INTRAVENOUS at 05:02

## 2024-01-01 RX ADMIN — CHLORHEXIDINE GLUCONATE 1 APPLICATION(S): 213 SOLUTION TOPICAL at 12:10

## 2024-01-01 RX ADMIN — FAMOTIDINE 20 MILLIGRAM(S): 10 INJECTION INTRAVENOUS at 11:01

## 2024-01-01 RX ADMIN — HEPARIN SODIUM 5000 UNIT(S): 5000 INJECTION INTRAVENOUS; SUBCUTANEOUS at 14:45

## 2024-01-01 RX ADMIN — Medication 100 MILLIGRAM(S): at 18:02

## 2024-01-01 RX ADMIN — HEPARIN SODIUM 5000 UNIT(S): 5000 INJECTION INTRAVENOUS; SUBCUTANEOUS at 13:04

## 2024-01-01 RX ADMIN — Medication 1 TABLET(S): at 12:08

## 2024-01-01 RX ADMIN — CHLORHEXIDINE GLUCONATE 1 APPLICATION(S): 213 SOLUTION TOPICAL at 11:09

## 2024-01-01 RX ADMIN — Medication 50 MILLIGRAM(S): at 21:00

## 2024-01-01 RX ADMIN — HEPARIN SODIUM 5000 UNIT(S): 5000 INJECTION INTRAVENOUS; SUBCUTANEOUS at 05:06

## 2024-01-01 RX ADMIN — Medication 3 MILLILITER(S): at 23:51

## 2024-01-01 RX ADMIN — Medication 0.5 MILLIGRAM(S): at 17:23

## 2024-01-01 RX ADMIN — PIPERACILLIN AND TAZOBACTAM 25 GRAM(S): 4; .5 INJECTION, POWDER, LYOPHILIZED, FOR SOLUTION INTRAVENOUS at 13:22

## 2024-01-01 RX ADMIN — Medication 0.5 MILLIGRAM(S): at 05:36

## 2024-01-01 RX ADMIN — HEPARIN SODIUM 5000 UNIT(S): 5000 INJECTION INTRAVENOUS; SUBCUTANEOUS at 14:18

## 2024-01-01 RX ADMIN — Medication 0.5 MILLIGRAM(S): at 05:26

## 2024-01-01 RX ADMIN — Medication 3 MILLILITER(S): at 11:01

## 2024-01-01 RX ADMIN — Medication 50 MICROGRAM(S): at 20:56

## 2024-01-01 RX ADMIN — Medication 3 MILLILITER(S): at 05:41

## 2024-01-01 RX ADMIN — Medication 650 MILLIGRAM(S): at 13:20

## 2024-01-01 RX ADMIN — Medication 1 MILLIGRAM(S): at 11:05

## 2024-01-01 RX ADMIN — Medication 3 MILLILITER(S): at 18:02

## 2024-01-01 RX ADMIN — Medication 650 MILLIGRAM(S): at 12:20

## 2024-01-01 RX ADMIN — FAMOTIDINE 20 MILLIGRAM(S): 10 INJECTION INTRAVENOUS at 12:08

## 2024-01-01 RX ADMIN — QUETIAPINE FUMARATE 25 MILLIGRAM(S): 200 TABLET, FILM COATED ORAL at 11:57

## 2024-01-01 RX ADMIN — HEPARIN SODIUM 5000 UNIT(S): 5000 INJECTION INTRAVENOUS; SUBCUTANEOUS at 21:00

## 2024-01-01 RX ADMIN — HEPARIN SODIUM 5000 UNIT(S): 5000 INJECTION INTRAVENOUS; SUBCUTANEOUS at 23:51

## 2024-01-01 RX ADMIN — HEPARIN SODIUM 5000 UNIT(S): 5000 INJECTION INTRAVENOUS; SUBCUTANEOUS at 05:19

## 2024-01-01 RX ADMIN — HEPARIN SODIUM 5000 UNIT(S): 5000 INJECTION INTRAVENOUS; SUBCUTANEOUS at 13:22

## 2024-01-01 RX ADMIN — Medication 1 TABLET(S): at 11:10

## 2024-01-01 RX ADMIN — Medication 1 MILLIGRAM(S): at 11:58

## 2024-01-01 RX ADMIN — Medication 50 MILLIGRAM(S): at 21:28

## 2024-01-01 RX ADMIN — Medication 0.5 MILLIGRAM(S): at 05:19

## 2024-01-01 RX ADMIN — Medication 50 MILLIGRAM(S): at 22:40

## 2024-01-01 RX ADMIN — Medication 650 MILLIGRAM(S): at 11:28

## 2024-01-01 RX ADMIN — Medication 0.5 MILLIGRAM(S): at 17:21

## 2024-01-01 RX ADMIN — QUETIAPINE FUMARATE 25 MILLIGRAM(S): 200 TABLET, FILM COATED ORAL at 11:11

## 2024-01-01 RX ADMIN — Medication 0.5 MILLIGRAM(S): at 17:12

## 2024-01-01 RX ADMIN — Medication 3 MILLILITER(S): at 17:01

## 2024-01-01 RX ADMIN — Medication 1 MILLIGRAM(S): at 11:02

## 2024-01-01 RX ADMIN — HEPARIN SODIUM 5000 UNIT(S): 5000 INJECTION INTRAVENOUS; SUBCUTANEOUS at 15:53

## 2024-01-01 RX ADMIN — Medication 0.5 MILLIGRAM(S): at 17:03

## 2024-01-01 RX ADMIN — Medication 3 MILLILITER(S): at 23:24

## 2024-01-01 RX ADMIN — PIPERACILLIN AND TAZOBACTAM 25 GRAM(S): 4; .5 INJECTION, POWDER, LYOPHILIZED, FOR SOLUTION INTRAVENOUS at 05:20

## 2024-01-01 RX ADMIN — Medication 0.5 MILLIGRAM(S): at 18:02

## 2024-01-01 RX ADMIN — Medication 40 MILLIGRAM(S): at 05:03

## 2024-01-01 RX ADMIN — PIPERACILLIN AND TAZOBACTAM 25 GRAM(S): 4; .5 INJECTION, POWDER, LYOPHILIZED, FOR SOLUTION INTRAVENOUS at 05:07

## 2024-01-01 RX ADMIN — Medication 40 MILLIGRAM(S): at 05:42

## 2024-01-01 RX ADMIN — QUETIAPINE FUMARATE 25 MILLIGRAM(S): 200 TABLET, FILM COATED ORAL at 12:17

## 2024-01-01 RX ADMIN — Medication 3 MILLILITER(S): at 12:09

## 2024-01-01 RX ADMIN — PIPERACILLIN AND TAZOBACTAM 200 GRAM(S): 4; .5 INJECTION, POWDER, LYOPHILIZED, FOR SOLUTION INTRAVENOUS at 22:38

## 2024-01-01 RX ADMIN — Medication 40 MILLIGRAM(S): at 05:06

## 2024-01-01 RX ADMIN — FAMOTIDINE 20 MILLIGRAM(S): 10 INJECTION INTRAVENOUS at 11:05

## 2024-01-01 RX ADMIN — Medication 3 MILLILITER(S): at 05:06

## 2024-01-01 RX ADMIN — Medication 50 MILLIGRAM(S): at 21:11

## 2024-01-01 RX ADMIN — QUETIAPINE FUMARATE 25 MILLIGRAM(S): 200 TABLET, FILM COATED ORAL at 16:32

## 2024-01-01 RX ADMIN — HEPARIN SODIUM 5000 UNIT(S): 5000 INJECTION INTRAVENOUS; SUBCUTANEOUS at 05:42

## 2024-01-01 RX ADMIN — PIPERACILLIN AND TAZOBACTAM 25 GRAM(S): 4; .5 INJECTION, POWDER, LYOPHILIZED, FOR SOLUTION INTRAVENOUS at 21:11

## 2024-01-01 RX ADMIN — HEPARIN SODIUM 5000 UNIT(S): 5000 INJECTION INTRAVENOUS; SUBCUTANEOUS at 21:28

## 2024-01-01 RX ADMIN — Medication 40 MILLIGRAM(S): at 05:24

## 2024-01-01 RX ADMIN — Medication 100 MICROGRAM(S): at 06:23

## 2024-01-01 RX ADMIN — HEPARIN SODIUM 5000 UNIT(S): 5000 INJECTION INTRAVENOUS; SUBCUTANEOUS at 21:11

## 2024-01-01 RX ADMIN — HEPARIN SODIUM 5000 UNIT(S): 5000 INJECTION INTRAVENOUS; SUBCUTANEOUS at 21:36

## 2024-01-01 RX ADMIN — Medication 1 MILLIGRAM(S): at 12:08

## 2024-01-01 RX ADMIN — Medication 650 MILLIGRAM(S): at 12:25

## 2024-01-01 RX ADMIN — Medication 325 MILLIGRAM(S): at 11:05

## 2024-01-01 RX ADMIN — PIPERACILLIN AND TAZOBACTAM 25 GRAM(S): 4; .5 INJECTION, POWDER, LYOPHILIZED, FOR SOLUTION INTRAVENOUS at 14:18

## 2024-01-01 RX ADMIN — CHLORHEXIDINE GLUCONATE 1 APPLICATION(S): 213 SOLUTION TOPICAL at 12:19

## 2024-01-01 RX ADMIN — Medication 3 MILLILITER(S): at 17:40

## 2024-01-01 RX ADMIN — CHLORHEXIDINE GLUCONATE 1 APPLICATION(S): 213 SOLUTION TOPICAL at 11:03

## 2024-01-01 RX ADMIN — Medication 40 MILLIGRAM(S): at 05:22

## 2024-01-01 RX ADMIN — Medication 3 MILLILITER(S): at 23:25

## 2024-01-01 RX ADMIN — Medication 50 MILLIGRAM(S): at 21:37

## 2024-01-01 RX ADMIN — Medication 100 MILLIGRAM(S): at 15:53

## 2024-01-01 RX ADMIN — Medication 1 TABLET(S): at 11:01

## 2024-01-01 RX ADMIN — Medication 3 MILLILITER(S): at 05:34

## 2024-01-01 RX ADMIN — Medication 50 MILLIGRAM(S): at 23:51

## 2024-01-01 RX ADMIN — Medication 325 MILLIGRAM(S): at 12:17

## 2024-01-01 RX ADMIN — Medication 3 MILLILITER(S): at 05:19

## 2024-01-01 RX ADMIN — Medication 1 MILLIGRAM(S): at 11:01

## 2024-01-01 RX ADMIN — PIPERACILLIN AND TAZOBACTAM 25 GRAM(S): 4; .5 INJECTION, POWDER, LYOPHILIZED, FOR SOLUTION INTRAVENOUS at 06:54

## 2024-01-01 RX ADMIN — Medication 50 MICROGRAM(S): at 20:58

## 2024-01-01 RX ADMIN — Medication 0.5 MILLIGRAM(S): at 18:11

## 2024-01-01 RX ADMIN — HEPARIN SODIUM 5000 UNIT(S): 5000 INJECTION INTRAVENOUS; SUBCUTANEOUS at 13:18

## 2024-01-01 RX ADMIN — PIPERACILLIN AND TAZOBACTAM 25 GRAM(S): 4; .5 INJECTION, POWDER, LYOPHILIZED, FOR SOLUTION INTRAVENOUS at 13:06

## 2024-01-01 RX ADMIN — Medication 3 MILLILITER(S): at 23:30

## 2024-01-01 RX ADMIN — SODIUM ZIRCONIUM CYCLOSILICATE 10 GRAM(S): 10 POWDER, FOR SUSPENSION ORAL at 20:20

## 2024-01-01 RX ADMIN — HEPARIN SODIUM 5000 UNIT(S): 5000 INJECTION INTRAVENOUS; SUBCUTANEOUS at 05:22

## 2024-01-01 RX ADMIN — HEPARIN SODIUM 5000 UNIT(S): 5000 INJECTION INTRAVENOUS; SUBCUTANEOUS at 06:23

## 2024-01-01 RX ADMIN — Medication 3 MILLILITER(S): at 18:10

## 2024-01-01 RX ADMIN — CHLORHEXIDINE GLUCONATE 1 APPLICATION(S): 213 SOLUTION TOPICAL at 11:10

## 2024-01-01 RX ADMIN — Medication 100 MILLIGRAM(S): at 23:23

## 2024-01-01 RX ADMIN — FAMOTIDINE 20 MILLIGRAM(S): 10 INJECTION INTRAVENOUS at 12:17

## 2024-01-01 RX ADMIN — Medication 3 MILLILITER(S): at 05:03

## 2024-01-01 RX ADMIN — Medication 3 MILLILITER(S): at 11:58

## 2024-01-01 RX ADMIN — Medication 0.5 MILLIGRAM(S): at 05:06

## 2024-01-01 RX ADMIN — Medication 0.5 MILLIGRAM(S): at 17:01

## 2024-01-01 RX ADMIN — Medication 0.5 MILLIGRAM(S): at 18:05

## 2024-01-01 RX ADMIN — Medication 100 MILLIGRAM(S): at 13:22

## 2024-01-01 RX ADMIN — Medication 100 MICROGRAM(S): at 05:07

## 2024-01-01 RX ADMIN — QUETIAPINE FUMARATE 25 MILLIGRAM(S): 200 TABLET, FILM COATED ORAL at 23:51

## 2024-01-01 RX ADMIN — HEPARIN SODIUM 5000 UNIT(S): 5000 INJECTION INTRAVENOUS; SUBCUTANEOUS at 05:26

## 2024-01-01 RX ADMIN — FAMOTIDINE 20 MILLIGRAM(S): 10 INJECTION INTRAVENOUS at 12:15

## 2024-01-01 RX ADMIN — Medication 325 MILLIGRAM(S): at 11:01

## 2024-01-01 RX ADMIN — Medication 50 MILLIGRAM(S): at 20:58

## 2024-01-01 RX ADMIN — Medication 3 MILLILITER(S): at 17:12

## 2024-01-01 RX ADMIN — Medication 40 MILLIGRAM(S): at 05:19

## 2024-01-01 RX ADMIN — Medication 325 MILLIGRAM(S): at 12:08

## 2024-01-01 RX ADMIN — Medication 0.5 MILLIGRAM(S): at 05:03

## 2024-01-01 RX ADMIN — Medication 325 MILLIGRAM(S): at 15:53

## 2024-01-01 RX ADMIN — FAMOTIDINE 20 MILLIGRAM(S): 10 INJECTION INTRAVENOUS at 11:03

## 2024-01-01 RX ADMIN — Medication 3 MILLILITER(S): at 23:08

## 2024-01-01 RX ADMIN — QUETIAPINE FUMARATE 25 MILLIGRAM(S): 200 TABLET, FILM COATED ORAL at 11:03

## 2024-01-01 RX ADMIN — PIPERACILLIN AND TAZOBACTAM 25 GRAM(S): 4; .5 INJECTION, POWDER, LYOPHILIZED, FOR SOLUTION INTRAVENOUS at 01:32

## 2024-01-01 RX ADMIN — Medication 3 MILLILITER(S): at 05:24

## 2024-01-01 RX ADMIN — Medication 3 MILLILITER(S): at 18:05

## 2024-01-01 RX ADMIN — QUETIAPINE FUMARATE 25 MILLIGRAM(S): 200 TABLET, FILM COATED ORAL at 11:01

## 2024-01-01 RX ADMIN — FAMOTIDINE 20 MILLIGRAM(S): 10 INJECTION INTRAVENOUS at 11:57

## 2024-01-01 RX ADMIN — Medication 3 MILLILITER(S): at 17:23

## 2024-01-01 RX ADMIN — Medication 325 MILLIGRAM(S): at 13:38

## 2024-01-01 RX ADMIN — Medication 3 MILLILITER(S): at 06:23

## 2024-01-01 RX ADMIN — Medication 325 MILLIGRAM(S): at 11:11

## 2024-01-01 RX ADMIN — Medication 3 MILLILITER(S): at 05:22

## 2024-01-01 RX ADMIN — Medication 3 MILLILITER(S): at 11:05

## 2024-01-01 RX ADMIN — Medication 1 TABLET(S): at 11:57

## 2024-01-01 RX ADMIN — Medication 3 MILLILITER(S): at 12:17

## 2024-01-01 RX ADMIN — Medication 100 MILLIGRAM(S): at 18:04

## 2024-01-01 RX ADMIN — QUETIAPINE FUMARATE 25 MILLIGRAM(S): 200 TABLET, FILM COATED ORAL at 11:05

## 2024-04-18 NOTE — PATIENT PROFILE ADULT - INFORMATION COULD NOT BE OBTAINED DETAILS
Presents with swelling and pain in his left lower extremity has discomfort with ambulation has to walk on his tip toes  He has also had this in his right leg in the past and does seem to be migratory symptoms  He has easily palpable pedal pulse on the right however does have peripheral arterial disease in the left but does not appear to be limb threatening  There is also modest swelling in the left lower extremity possibly indicative of compartment syndrome however he has had migratory symptoms in the past affecting both legs intermittently  I do not feel that his peripheral arterial disease is significant and treatment of it in more all likelihood would not change his symptoms  Plan:  I am requesting a rheumatology consult as this might be some type of autoimmune verses potential rheumatoid arthritis    Will await evaluation by Rheumatology for further planning if necessary pt confused

## 2024-04-18 NOTE — ED PROVIDER NOTE - OBJECTIVE STATEMENT
HPI & ROS: 83-year-old male history of alcohol abuse asthma hyperlipidemia hypertension hypothyroid, CLL currently not being on chemo or radiation, coming in with increased creatinine and BUN in the outpatient setting.  With no increased white count.  White count on 8/9 was 45, 9/25 it was 33, today it is 68.  Hemoglobin steady at 8.7.  Platelet count 180 today, last on 9/25 was 133.   BUN and creatinine on 8/9/23 was 0.91 and 15.  4- the creatinine is 1.8 with a BUN of 52.  Patient without fevers.  General chronic cough.  No chills.  No real lethargy apart from normal.  No belly pain.  Normal bowel movements.  Has been hydrating but was also placed on 2 diuretics and was told to stop them this week. + swollen bl LE     onc: poncho bello HPI & ROS: 83-year-old male history of alcohol abuse asthma hyperlipidemia hypertension hypothyroid, CLL currently not being on chemo or radiation, coming in with increased creatinine and BUN in the outpatient setting.  With no increased white count.  White count on 8/9 was 45, 9/25 it was 33, today it is 68.  Hemoglobin steady at 8.7.  Platelet count 180 today, last on 9/25 was 133.   BUN and creatinine on 8/9/23 was 0.91 and 15.  4- the creatinine is 1.8 with a BUN of 52.  Patient without fevers.  General chronic cough and SOB.  No chills. + fatigue. No belly pain.  Normal bowel movements.  Has been hydrating but was also placed on 2 diuretics and was told to stop them this week. + swollen bl LE     onc: poncho bello HPI & ROS: 83-year-old male history of alcohol abuse asthma hyperlipidemia hypertension hypothyroid, CLL currently not being on chemo or radiation, coming in with increased creatinine and BUN in the outpatient setting.  With increased white count.  White count on 8/9 was 45, 9/25 it was 33, today it is 68.  Hemoglobin steady at 8.7.  Platelet count 180 today, last on 9/25 was 133.   BUN and creatinine on 8/9/23 was 0.91 and 15.  4- the creatinine is 1.8 with a BUN of 52.  Patient without fevers.  General chronic cough and SOB.  No chills. + fatigue. No belly pain.  Normal bowel movements.  Has been hydrating but was also placed on 2 diuretics and was told to stop them this week. + swollen bl LE     onc: poncho bello

## 2024-04-18 NOTE — ED PROVIDER NOTE - ATTENDING CONTRIBUTION TO CARE
Key Soriano MD - Attending Physician: Pt here with noted elevated creatinine and increasing WBC count in setting of untreated CLL and recent diuretic use. Clinically with edema suggesting fluid overload but also with hemoconcentration and MARY suggesting intravascularly dry. Will admit for further management

## 2024-04-18 NOTE — H&P ADULT - NSHPLABSRESULTS_GEN_ALL_CORE
LABS:                        8.8    53.23 )-----------( 183      ( 18 Apr 2024 12:59 )             27.4     04-18    135  |  100  |  47<H>  ----------------------------<  104<H>  5.5<H>   |  22  |  1.73<H>    Ca    11.8<H>      18 Apr 2024 12:59  Mg     2.6     04-18    TPro  6.1  /  Alb  3.1<L>  /  TBili  0.5  /  DBili  x   /  AST  39  /  ALT  24  /  AlkPhos  191<H>  04-18      Urinalysis Basic - ( 18 Apr 2024 12:59 )    Color: x / Appearance: x / SG: x / pH: x  Gluc: 104 mg/dL / Ketone: x  / Bili: x / Urobili: x   Blood: x / Protein: x / Nitrite: x   Leuk Esterase: x / RBC: x / WBC x   Sq Epi: x / Non Sq Epi: x / Bacteria: x            RADIOLOGY & ADDITIONAL TESTS:

## 2024-04-18 NOTE — H&P ADULT - HISTORY OF PRESENT ILLNESS
83-year-old male history of alcohol abuse asthma hyperlipidemia hypertension hypothyroid, CLL currently not being on chemo or radiation, coming in with increased creatinine and BUN in the outpatient setting.  With no increased white count.  White count on 8/9 was 45, 9/25 it was 33, today it is 68.  Hemoglobin steady at 8.7.  Platelet count 180 today, last on 9/25 was 133.   BUN and creatinine on 8/9/23 was 0.91 and 15.  4- the creatinine is 1.8 with a BUN of 52.  Patient without fevers.  General chronic cough and SOB.  No chills. + fatigue. No belly pain.  Normal bowel movements.  Has been hydrating but was also placed on 2 diuretics and was told to stop them this week. + swollen bl LE

## 2024-04-18 NOTE — ED ADULT NURSE NOTE - NSFALLRISKINTERV_ED_ALL_ED
Assistance OOB with selected safe patient handling equipment if applicable/Assistance with ambulation/Communicate fall risk and risk factors to all staff, patient, and family/Monitor gait and stability/Monitor for mental status changes and reorient to person, place, and time, as needed/Provide patient with walking aids/Provide visual cue: yellow wristband, yellow gown, etc/Reinforce activity limits and safety measures with patient and family/Toileting schedule using arm’s reach rule for commode and bathroom/Use of alarms - bed, stretcher, chair and/or video monitoring/Call bell, personal items and telephone in reach/Instruct patient to call for assistance before getting out of bed/chair/stretcher/Non-slip footwear applied when patient is off stretcher/[place] to call system/Physically safe environment - no spills, clutter or unnecessary equipment/Purposeful Proactive Rounding/Room/bathroom lighting operational, light cord in reach

## 2024-04-18 NOTE — ED ADULT TRIAGE NOTE - BP NONINVASIVE SYSTOLIC (MM HG)
Pt presented to clinic for regular visit, pt was walking around in waiting room without any issues. He was brought back to clinic room around 11:40 where he suddenly experienced sharp crushing left sided chest pain rated 10/10. The MA notified RN to room, pt was visible in distress grasping his chest and unable to focus and speak to nurse. EMS was called to clinic. Vitals were obtained BP stable, O2 96% HR elevated to 100, lung sounds clear, heart regular on auscultation, no abdominal tenderness present on palpation, Faith Swain provider called to room and assessment reported by RN. AT 11:43 oxygen at 2 liters per NC were administered to patient, 2 baby ASA tablets were given to patient by Faith Swain NP. Pt began to feel better with a pain rating of 5-6/10. Frequent vital monitoring while awaiting EMS arrival, RN present with patient EMS arrived around 11:50 and pt transported to [facility] ER for evaluation.   
124

## 2024-04-18 NOTE — ED PROVIDER NOTE - CLINICAL SUMMARY MEDICAL DECISION MAKING FREE TEXT BOX
MDM/Summary/DDx (includes but is not limited to):   83-year-old male is making urine, history of alcoholism, hypertension hyperlipidemia CLL currently not on chemotherapy coming in with creatinine and BUN increased.  With pedal edema in the setting of taking 2 diuretics.   CBC CMP urine studies UA UC.  Imaging will use renal bladder ultrasound from ultrasound team.  No treatment at this time, not in pain.  Disposition likely admit    Triage note reviewed. VS reviewed. EKG reviewed and documented in "RESULTS" section, if possible at given time.     DDx in MDM includes the most likely ddx, but is not limited to solely what is listed. Clinical course may alter/deviate from the above plan. When possible, progress notes written, as needed, and are included in "PROGRESS NOTE" section below.       Medical, family, and social determinants of health reviewed and discussed w/ pt/family/caretaker, when allowable, and is incorporated into note above, whenever possible.

## 2024-04-18 NOTE — ED PROVIDER NOTE - NSICDXPASTMEDICALHX_GEN_ALL_CORE_FT
PAST MEDICAL HISTORY:  Alcohol abuse     Asthma     Hyperlipidemia     Hypertension     Hypothyroid

## 2024-04-18 NOTE — ED ADULT TRIAGE NOTE - CHIEF COMPLAINT QUOTE
sent from oncologist for increased BUN/Cr  family reports fatigue, sob, confusion, decreased PO intake

## 2024-04-18 NOTE — ED PROVIDER NOTE - OTHER FINDINGS
Right bundle branch block noted.  No other blocks noted.  Wide.  No T wave inversions.  No ST elevations or depressions.

## 2024-04-18 NOTE — ED ADULT NURSE NOTE - OBJECTIVE STATEMENT
82yo M pmhx HTN, HLD, hypothyroid, asthma, alcohol abuse, CLL not currently on chemo or radiation, presents to ED sent in by oncologist for elevated BUN and creatinine on outpatient labs. wife present at bedside providing history. wife reports as of recently pt has been more confused and disoriented to time, walks with walker at baseline, and has been incontinent of bowel and bladder since his last hospital admission. pt is poor historian, found to be A&Ox2 on exam, oriented to person and place, disoriented to time and situation. pt denying any new pain or complaints currently. wife denies any recent falls, fevers, or urinary/bowel changes. vital signs stable. breathing even and unlabored on RA. abd soft nontender nondistended. pt PALOMINO. + b/l pedal edema noted. pt seen and eval by ED MD. IV placed, labs drawn and sent. soiled diaper noted on pt. Patient undressed and placed into gown, skin cleaned and dried, condom catheter placed to pt with drainage bag set to gravity, clean new diaper in place. appropriate side rails up for safety and call bell in reach. plan of care discussed.

## 2024-04-18 NOTE — ED PROCEDURE NOTE - PROCEDURE ADDITIONAL DETAILS
Emergency Department Focused Ultrasound performed at patient's bedside.  The complete report can be found in PACS. - Krista Rivera MD

## 2024-04-18 NOTE — H&P ADULT - ASSESSMENT
83-year-old male history of alcohol abuse asthma hyperlipidemia hypertension hypothyroid, CLL currently not being on chemo or radiation, coming in with increased creatinine and BUN in the outpatient setting.  With no increased white count.  White count on 8/9 was 45, 9/25 it was 33, today it is 68.  Hemoglobin steady at 8.7.  Platelet count 180 today, last on 9/25 was 133.   BUN and creatinine on 8/9/23 was 0.91 and 15.  4- the creatinine is 1.8 with a BUN of 52.  Patient without fevers.  General chronic cough and SOB.  No chills. + fatigue. No belly pain.  Normal bowel movements.  Has been hydrating but was also placed on 2 diuretics and was told to stop them this week. + swollen bl LE     MARY  - monitor cre  - avoid nephrotoxins  - nephrology consult    r/o pulm edema  - cardiomegaly  - TTE    COPD  - c/w duoneb  - inhaled steroids    lukas edema  - check doppler    dvt px  - sq heparin  CLL  - hematology consult

## 2024-04-18 NOTE — PATIENT PROFILE ADULT - FALL HARM RISK - HARM RISK INTERVENTIONS

## 2024-04-18 NOTE — ED PROVIDER NOTE - PHYSICAL EXAMINATION
Exam as stated below:   CONSTITUTIONAL: In NAD.   SKIN: Warm dry. No rashes noted.   EYES: No scleral icterus. Conjunctiva pink.  ENT: Posterior pharynx with no erythema.  CARD: RRR. No murmurs.  RESP: Clear to ausculation b/l. No Crackles noted. No Wheezing noted.  ABD: Soft. Non-tender. Not distended.   MSK: + bl pedal edema. No calf tenderness.  NEURO: UE/LE grossly intact. Motor UE/LE sensation grossly intact. CN II-XII grossly intact.   PSYCH: Cooperative, appropriate.

## 2024-04-19 NOTE — CONSULT NOTE ADULT - ASSESSMENT
83M with CLL not on treatment, iron deficiency anemia, history of alcohol abuse, asthma, HLD, HTN, hypothyroid with cough, weakness, increased BUN/Cr    #CLL  - not on treatment  - leukocytosis, lymphocyte predominant- higher than recent baseline but WBC has been at this level in past  - WBC likely higher with acute illness  - plt normal and no evidence of hemolysis    #Anemia, normocytic  - lower than prior- likely in setting of acute illness, renal dysfunction  - no overt bleeding, check stool occult  - iron studies 4/18 as outpt: ferritin 346, iron 21, iron sat 9; B12/folate elevated  - on oral iron at baseline, can consider IV iron as iron sat is below baseline if blood cultures are negative  - bili is normal, not c/w hemolysis- check LDH/haptoglobin  - monitor Hg, transfuse as needed    d/w wife bedside 83M with CLL not on treatment, iron deficiency anemia, history of alcohol abuse, asthma, HLD, HTN, hypothyroid with cough, weakness, increased BUN/Cr    #CLL  - not on treatment  - leukocytosis, lymphocyte predominant- higher than recent baseline but WBC has been at this level in past  - WBC likely higher with acute illness  - plt normal and no evidence of hemolysis    #Anemia, normocytic  - lower than prior- likely in setting of acute illness, renal dysfunction, also with hypothyroid and very elevated TSH  - no overt bleeding, check stool occult  - iron studies 4/18 as outpt: ferritin 346, iron 21, iron sat 9; B12/folate elevated  - on oral iron at baseline, can consider IV iron as iron sat is below baseline if blood cultures are negative  - bili is normal, not c/w hemolysis- check LDH/haptoglobin  - monitor Hg, transfuse as needed    #hypercalcemia likely in setting of volume depletion- improved from admission, check PTH    MARY per Nephrology    d/w wife bedside

## 2024-04-19 NOTE — PHYSICAL THERAPY INITIAL EVALUATION ADULT - PERTINENT HX OF CURRENT PROBLEM, REHAB EVAL
83-year-old male history of alcohol abuse asthma hyperlipidemia hypertension hypothyroid, CLL currently not being on chemo or radiation, coming in with increased creatinine and BUN in the outpatient setting.  With no increased white count.  White count on 8/9 was 45, 9/25 it was 33, today it is 68.  Hemoglobin steady at 8.7.  Platelet count 180 today, last on 9/25 was 133.   BUN and creatinine on 8/9/23 was 0.91 and 15.  4- the creatinine is 1.8 with a BUN of 52.  Patient without fevers.  General chronic cough and SOB.  No chills. + fatigue. No belly pain.  Normal bowel movements.  Has been hydrating but was also placed on 2 diuretics and was told to stop them this week. + swollen bl LE. Hosp Course: 4/18 CXR +cardiomegaly, L pleural effusion and atelectasis, mild vascular congestion; 4/18 VA duplex BLE neg for DVT

## 2024-04-19 NOTE — PHYSICAL THERAPY INITIAL EVALUATION ADULT - STRENGTHENING, PT EVAL
GOAL: Patient will demonstrate a 1/3 increase in strength where deficient to assist with performing functional mobility and ADLs in 2 weeks. GOAL: Patient will demonstrate a 1/3 increase in strength where deficient to assist with performing functional mobility and ADLs in 3 weeks.

## 2024-04-19 NOTE — CONSULT NOTE ADULT - PROBLEM SELECTOR RECOMMENDATION 9
Will get full thyroid panel, Will continue current Synthroid dose, will FU.  TPO AB pending   Suggest to repeat thyroid function test in 4-6 weeks. Outpatient follow up.

## 2024-04-19 NOTE — PHYSICAL THERAPY INITIAL EVALUATION ADULT - NSPTDISCHREC_GEN_A_CORE
TBD pending functional evaluation ZACK is recommended; if home patient would require hands on assist for ALL aspects of functional mobility and ADLs with [facility] services/Sub-acute Rehab

## 2024-04-19 NOTE — PHYSICAL THERAPY INITIAL EVALUATION ADULT - BED MOBILITY TRAINING, PT EVAL
GOAL: Patient will perform bed mobility independently in 2 weeks. GOAL: Patient will perform bed mobility independently in 3 weeks.

## 2024-04-19 NOTE — DIETITIAN INITIAL EVALUATION ADULT - PERTINENT MEDS FT
MEDICATIONS  (STANDING):  albuterol/ipratropium for Nebulization 3 milliLiter(s) Nebulizer every 6 hours  aspirin 325 milliGRAM(s) Oral daily  buDESOnide    Inhalation Suspension 0.5 milliGRAM(s) Inhalation two times a day  famotidine    Tablet 20 milliGRAM(s) Oral daily  furosemide   Injectable 40 milliGRAM(s) IV Push daily  heparin   Injectable 5000 Unit(s) SubCutaneous every 8 hours  QUEtiapine 25 milliGRAM(s) Oral at bedtime  traZODone 50 milliGRAM(s) Oral at bedtime    MEDICATIONS  (PRN):  acetaminophen     Tablet .. 650 milliGRAM(s) Oral every 6 hours PRN Mild Pain (1 - 3)

## 2024-04-19 NOTE — DIETITIAN INITIAL EVALUATION ADULT - PERTINENT LABORATORY DATA
04-19    137  |  103  |  42<H>  ----------------------------<  67<L>  4.4   |  23  |  1.48<H>    Ca    10.9<H>      19 Apr 2024 06:53  Phos  2.8     04-19  Mg     2.7     04-19    TPro  6.1  /  Alb  3.1<L>  /  TBili  0.5  /  DBili  x   /  AST  39  /  ALT  24  /  AlkPhos  191<H>  04-18

## 2024-04-19 NOTE — DIETITIAN INITIAL EVALUATION ADULT - ORAL INTAKE PTA/DIET HISTORY
Pt confirms no known food allergies/intolerances. Reports having a good appetite and PO intakes at home. Did not follow any specific dietary restrictions. Pt denies nausea, vomiting, diarrhea, or constipation. Denies difficulty chewing/swallowing.

## 2024-04-19 NOTE — CONSULT NOTE ADULT - SUBJECTIVE AND OBJECTIVE BOX
HPI:  83-year-old male history of alcohol abuse asthma hyperlipidemia hypertension hypothyroid, CLL currently not being on chemo or radiation, coming in with increased creatinine and BUN in the outpatient setting.  With no increased white count.  White count on 8/9 was 45, 9/25 it was 33, today it is 68.  Hemoglobin steady at 8.7.  Platelet count 180 today, last on 9/25 was 133.   BUN and creatinine on 8/9/23 was 0.91 and 15.  4- the creatinine is 1.8 with a BUN of 52.  Patient without fevers.  General chronic cough and SOB.  No chills. + fatigue. No belly pain.  Normal bowel movements.  Has been hydrating but was also placed on 2 diuretics and was told to stop them this week. 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)            HOME MEDICATIONS:  Home Medications:  acetaminophen 325 mg oral tablet: 2 tab(s) orally as needed for pain (18 Apr 2024 19:02)  Albuterol (Eqv-ProAir HFA) 90 mcg/inh inhalation aerosol: 2 puff(s) inhaled every 6 hours as needed for  shortness of breath and/or wheezing (18 Apr 2024 19:02)  aspirin 325 mg oral tablet: 1 tab(s) orally once a day (18 Apr 2024 19:02)  budesonide 0.5 mg/2 mL inhalation suspension: 2 milliliter(s) by nebulizer 2 times a day as needed (18 Apr 2024 19:02)  famotidine 20 mg oral tablet: 1 tab(s) orally once a day (18 Apr 2024 19:02)  formoterol 20 mcg/2 mL inhalation solution: 2 milliliter(s) inhaled every 12 hours (18 Apr 2024 19:02)  furosemide 40 mg oral tablet: 1 tab(s) orally once a day (18 Apr 2024 19:02)  ipratropium-albuterol 0.5 mg-2.5 mg/3 mL inhalation solution: 3 milliliter(s) by nebulizer 4 times a day as needed (18 Apr 2024 19:02)  meloxicam 15 mg oral tablet: 1 tab(s) orally once a day (18 Apr 2024 19:02)  Multiple Vitamins oral tablet: 1 tab(s) orally once a day (18 Apr 2024 19:02)  QUEtiapine 25 mg oral tablet: 1 tab(s) orally once a day (at bedtime) (18 Apr 2024 19:02)  traZODone 50 mg oral tablet: 1 tab(s) orally once a day (at bedtime) (18 Apr 2024 19:02)  Xopenex HFA CFC free 45 mcg/inh inhalation aerosol: 1 puff(s) inhaled , As Needed (18 Apr 2024 19:02)            MEDICATIONS  (STANDING):  albuterol/ipratropium for Nebulization 3 milliLiter(s) Nebulizer every 6 hours  aspirin 325 milliGRAM(s) Oral daily  buDESOnide    Inhalation Suspension 0.5 milliGRAM(s) Inhalation two times a day  famotidine    Tablet 20 milliGRAM(s) Oral daily  folic acid 1 milliGRAM(s) Oral daily  furosemide   Injectable 40 milliGRAM(s) IV Push daily  heparin   Injectable 5000 Unit(s) SubCutaneous every 8 hours  levothyroxine 25 MICROGram(s) Oral daily  multivitamin/minerals 1 Tablet(s) Oral daily  QUEtiapine 25 milliGRAM(s) Oral daily  traZODone 50 milliGRAM(s) Oral at bedtime    MEDICATIONS  (PRN):  acetaminophen     Tablet .. 650 milliGRAM(s) Oral every 6 hours PRN Mild Pain (1 - 3)      Allergies    No Known Allergies    Intolerances        Review of Systems:  Neuro: No HA, no dizziness  Cardiovascular: No chest pain, no palpitations  Respiratory: no SOB, no cough  GI: No nausea, vomiting, abdominal pain  MSK: Denies joint/muscle pain      ALL OTHER SYSTEMS REVIEWED AND NEGATIVE        PHYSICAL EXAM:  VITALS: T(C): 36.4 (04-19-24 @ 12:33)  T(F): 97.6 (04-19-24 @ 12:33), Max: 97.7 (04-18-24 @ 18:20)  HR: 86 (04-19-24 @ 12:33) (78 - 88)  BP: 121/70 (04-19-24 @ 12:33) (108/70 - 127/53)  RR:  (18 - 20)  SpO2:  (91% - 97%)  Wt(kg): --  GENERAL: NAD, well-groomed, well-developed  NEURO:  alert and oriented  RESPIRATORY: Clear to auscultation bilaterally; No rales, rhonchi, wheezing  CARDIOVASCULAR: Si S2  GI: Soft, non distended, normal bowel sounds  MUSCULOSKELETAL: Moves all extremities equally                                 7.7    52.18 )-----------( 168      ( 19 Apr 2024 06:59 )             22.1       04-19    137  |  103  |  42<H>  ----------------------------<  67<L>  4.4   |  23  |  1.48<H>    eGFR: 47<L>    Ca    10.9<H>      04-19  Mg     2.7     04-19  Phos  2.8     04-19    TPro  6.1  /  Alb  3.1<L>  /  TBili  0.5  /  DBili  x   /  AST  39  /  ALT  24  /  AlkPhos  191<H>  04-18      Thyroid Function Tests:  04-19 @ 06:59 TSH 78.60 FreeT4 -- T3 -- Anti TPO -- Anti Thyroglobulin Ab -- TSI --    Diet, Regular (04-18-24 @ 19:48) [Active]                             HPI:  83-year-old male history of alcohol abuse asthma hyperlipidemia hypertension hypothyroid, CLL currently not being on chemo or radiation, coming in with increased creatinine and BUN in the outpatient setting.  With no increased white count.  White count on 8/9 was 45, 9/25 it was 33, today it is 68.  Hemoglobin steady at 8.7.  Platelet count 180 today, last on 9/25 was 133.   BUN and creatinine on 8/9/23 was 0.91 and 15.  4- the creatinine is 1.8 with a BUN of 52.  Patient without fevers.  General chronic cough and SOB.  No chills. + fatigue. No belly pain.  Normal bowel movements.  Has been hydrating but was also placed on 2 diuretics and was  told to stop them this week. 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)            HOME MEDICATIONS:  Home Medications:  acetaminophen 325 mg oral tablet: 2 tab(s) orally as needed for pain (18 Apr 2024 19:02)  Albuterol (Eqv-ProAir HFA) 90 mcg/inh inhalation aerosol: 2 puff(s) inhaled every 6 hours as needed for  shortness of breath and/or wheezing (18 Apr 2024 19:02)  aspirin 325 mg oral tablet: 1 tab(s) orally once a day (18 Apr 2024 19:02)  budesonide 0.5 mg/2 mL inhalation suspension: 2 milliliter(s) by nebulizer 2 times a day as needed (18 Apr 2024 19:02)  famotidine 20 mg oral tablet: 1 tab(s) orally once a day (18 Apr 2024 19:02)  formoterol 20 mcg/2 mL inhalation solution: 2 milliliter(s) inhaled every 12 hours (18 Apr 2024 19:02)  furosemide 40 mg oral tablet: 1 tab(s) orally once a day (18 Apr 2024 19:02)  ipratropium-albuterol 0.5 mg-2.5 mg/3 mL inhalation solution: 3 milliliter(s) by nebulizer 4 times a day as needed (18 Apr 2024 19:02)  meloxicam 15 mg oral tablet: 1 tab(s) orally once a day (18 Apr 2024 19:02)  Multiple Vitamins oral tablet: 1 tab(s) orally once a day (18 Apr 2024 19:02)  QUEtiapine 25 mg oral tablet: 1 tab(s) orally once a day (at bedtime) (18 Apr 2024 19:02)  traZODone 50 mg oral tablet: 1 tab(s) orally once a day (at bedtime) (18 Apr 2024 19:02)  Xopenex HFA CFC free 45 mcg/inh inhalation aerosol: 1 puff(s) inhaled , As Needed (18 Apr 2024 19:02)            MEDICATIONS  (STANDING):  albuterol/ipratropium for Nebulization 3 milliLiter(s) Nebulizer every 6 hours  aspirin 325 milliGRAM(s) Oral daily  buDESOnide    Inhalation Suspension 0.5 milliGRAM(s) Inhalation two times a day  famotidine    Tablet 20 milliGRAM(s) Oral daily  folic acid 1 milliGRAM(s) Oral daily  furosemide   Injectable 40 milliGRAM(s) IV Push daily  heparin   Injectable 5000 Unit(s) SubCutaneous every 8 hours  levothyroxine 25 MICROGram(s) Oral daily  multivitamin/minerals 1 Tablet(s) Oral daily  QUEtiapine 25 milliGRAM(s) Oral daily  traZODone 50 milliGRAM(s) Oral at bedtime    MEDICATIONS  (PRN):  acetaminophen     Tablet .. 650 milliGRAM(s) Oral every 6 hours PRN Mild Pain (1 - 3)      Allergies    No Known Allergies    Intolerances        Review of Systems:  Neuro: No HA, no dizziness  Cardiovascular: No chest pain, no palpitations  Respiratory: no SOB, no cough  GI: No nausea, vomiting, abdominal pain  MSK: Denies joint/muscle pain      ALL OTHER SYSTEMS REVIEWED AND NEGATIVE        PHYSICAL EXAM:  VITALS: T(C): 36.4 (04-19-24 @ 12:33)  T(F): 97.6 (04-19-24 @ 12:33), Max: 97.7 (04-18-24 @ 18:20)  HR: 86 (04-19-24 @ 12:33) (78 - 88)  BP: 121/70 (04-19-24 @ 12:33) (108/70 - 127/53)  RR:  (18 - 20)  SpO2:  (91% - 97%)  Wt(kg): --  GENERAL: NAD, well-groomed, well-developed  NEURO:  alert and oriented  RESPIRATORY: Clear to auscultation bilaterally; No rales, rhonchi, wheezing  CARDIOVASCULAR: Si S2  GI: Soft, non distended, normal bowel sounds  MUSCULOSKELETAL: Moves all extremities equally                                 7.7    52.18 )-----------( 168      ( 19 Apr 2024 06:59 )             22.1       04-19    137  |  103  |  42<H>  ----------------------------<  67<L>  4.4   |  23  |  1.48<H>    eGFR: 47<L>    Ca    10.9<H>      04-19  Mg     2.7     04-19  Phos  2.8     04-19    TPro  6.1  /  Alb  3.1<L>  /  TBili  0.5  /  DBili  x   /  AST  39  /  ALT  24  /  AlkPhos  191<H>  04-18      Thyroid Function Tests:  04-19 @ 06:59 TSH 78.60 FreeT4 -- T3 -- Anti TPO -- Anti Thyroglobulin Ab -- TSI --    Diet, Regular (04-18-24 @ 19:48) [Active]

## 2024-04-19 NOTE — PHYSICAL THERAPY INITIAL EVALUATION ADULT - GAIT TRAINING, PT EVAL
GOAL: Patient will ambulate 50 feet minAx1 with RW in 2 weeks. GOAL: Patient will ambulate 50 feet minAx1 with RW in 3 weeks.

## 2024-04-19 NOTE — PROGRESS NOTE ADULT - ASSESSMENT
83-year-old male history of alcohol abuse asthma hyperlipidemia hypertension hypothyroid, CLL currently not being on chemo or radiation, coming in with increased creatinine and BUN in the outpatient setting.  With no increased white count.  White count on 8/9 was 45, 9/25 it was 33, today it is 68.  Hemoglobin steady at 8.7.  Platelet count 180 today, last on 9/25 was 133.   BUN and creatinine on 8/9/23 was 0.91 and 15.  4- the creatinine is 1.8 with a BUN of 52.  Patient without fevers.  General chronic cough and SOB.  No chills. + fatigue. No belly pain.  Normal bowel movements.  Has been hydrating but was also placed on 2 diuretics and was told to stop them this week. + swollen bl LE     hypothyroid  - repeat tsh and free t4  - start synthroid  - endo consult    MARY  - monitor cre  - avoid nephrotoxins  - nephrology consult    r/o pulm edema  - cardiomegaly  - TTE    COPD  - c/w duoneb  - inhaled steroids    lukas edema  - doppler neg for dvt    dvt px  - sq heparin  CLL  - hematology consult

## 2024-04-19 NOTE — DIETITIAN INITIAL EVALUATION ADULT - REASON INDICATOR FOR ASSESSMENT
MST Score 2 or >  Information obtained from: Review of pt's current medical record, interview with pt in his assigned room on [id number]

## 2024-04-19 NOTE — DIETITIAN INITIAL EVALUATION ADULT - ADD RECOMMEND
1. Continue no therapeutic dietary restrictions to optimize PO intakes. Defer diet/texture modification to medical team/SLP as indicated  2. RD to add mighty shakes 2x/day to help meet pt's increased nutrient needs  3. Consider adding multivitamin , folic acid and thiamine if no medical contraindications to secondary to history of Alcohol abuse  4. Encourage adequate PO intakes as tolerated. Honor food preferences as appropriate and available. Staff to provide assistance with feeding during meal times as warranted by patient.  5. Monitor PO intake, GI tolerance, skin integrity and labs. RD remains available if needed, pt is aware.

## 2024-04-19 NOTE — PHYSICAL THERAPY INITIAL EVALUATION ADULT - CRITERIA FOR SKILLED THERAPEUTIC INTERVENTIONS
impairments found impairments found/functional limitations in following categories/rehab potential/therapy frequency/predicted duration of therapy intervention/anticipated equipment needs at discharge/anticipated discharge recommendation

## 2024-04-19 NOTE — CONSULT NOTE ADULT - ASSESSMENT
83-year-old male history of alcohol abuse asthma hyperlipidemia hypertension hypothyroid, CLL , pw worsening renal function  Assessment  Hypothyroidism: On Synthroid 25 mcg po daily prior, ?compliance, TSH elevated 78, rpt Free t4 and TPO AB pending   HTN: on antihypertensive medications, monitored, asymptomatic.  CKD: Monitor labs/BMP, renal follow up         Discussed plan and management wit Dr Candie Schreiber MD  Cell: [phone]  Office: [phone]             83-year-old male history of alcohol abuse asthma hyperlipidemia hypertension hypothyroid, CLL , pw worsening  renal function  Assessment  Hypothyroidism: On Synthroid 25 mcg po daily prior, ?compliance, TSH elevated 78, rpt Free t4 and TPO AB pending   HTN: on antihypertensive medications, monitored, asymptomatic.  CKD: Monitor labs/BMP, renal follow up         Discussed plan and management wit Dr Candie Schreiber MD  Cell: [phone]  Office: [phone]

## 2024-04-19 NOTE — PHYSICAL THERAPY INITIAL EVALUATION ADULT - LEVEL OF INDEPENDENCE: SIT/STAND, REHAB EVAL
Pt deferring as he was feeling dizzy and immediately requesting back to supine from EOB/moderate assist (50% patients effort)

## 2024-04-19 NOTE — PHYSICAL THERAPY INITIAL EVALUATION ADULT - BALANCE TRAINING, PT EVAL
GOAL: Patient will demonstrate an increase in static/dynamic balance in sitting/standing where deficient by at least 1 grade to facilitate greater independence during functional mobility and ADL's in 2 weeks. GOAL: Patient will demonstrate an increase in static/dynamic balance in sitting/standing where deficient by at least 1 grade to facilitate greater independence during functional mobility and ADL's in 3 weeks.

## 2024-04-19 NOTE — CONSULT NOTE ADULT - SUBJECTIVE AND OBJECTIVE BOX
Patient is a 83y old  Male who presents with a chief complaint of Chart Reviewed, Events Noted  "83-year-old male history of alcohol abuse asthma hyperlipidemia hypertension hypothyroid, CLL currently not being on chemo or radiation, coming in with increased creatinine and BUN in the outpatient setting."      (19 Apr 2024 10:36)      HPI:  83-year-old male history of alcohol abuse asthma hyperlipidemia hypertension hypothyroid, CLL currently not being on chemo or radiation, coming in with increased creatinine and BUN in the outpatient setting.  With no increased white count.  White count on 8/9 was 45, 9/25 it was 33, today it is 68.  Hemoglobin steady at 8.7.  Platelet count 180 today, last on 9/25 was 133.   BUN and creatinine on 8/9/23 was 0.91 and 15.  4- the creatinine is 1.8 with a BUN of 52.  Patient without fevers.  General chronic cough and SOB.  No chills. + fatigue. No belly pain.  Normal bowel movements.  Has been hydrating but was also placed on 2 diuretics and was told to stop them this week. + swollen bl LE  (18 Apr 2024 19:34)    Wife at bedside. Pt with productive cough, decreased appetite, increased weakness, increased difficulty ambulating over past week. Pt was in general at baseline prior to that- not very ambulatory at baseline and incontinent at baseline. No injury/fall, no bleeding, with dark stools baseline with iron. + LE edema    follows with Dr. Lainez and was seen in office 4/18 and sent to ED for further evaluation      PAST MEDICAL & SURGICAL HISTORY:    CLL    anemia    Asthma      Hypothyroid      Alcohol abuse      Hypertension      Hyperlipidemia          SOCIAL HISTORY:  Smoking - Non smoker   Alcohol - heavy in past  Drugs - No drug use  lives with wife    FAMILY HISTORY:      MEDICATIONS  (STANDING):  albuterol/ipratropium for Nebulization 3 milliLiter(s) Nebulizer every 6 hours  aspirin 325 milliGRAM(s) Oral daily  buDESOnide    Inhalation Suspension 0.5 milliGRAM(s) Inhalation two times a day  famotidine    Tablet 20 milliGRAM(s) Oral daily  furosemide   Injectable 40 milliGRAM(s) IV Push daily  heparin   Injectable 5000 Unit(s) SubCutaneous every 8 hours  QUEtiapine 25 milliGRAM(s) Oral at bedtime  traZODone 50 milliGRAM(s) Oral at bedtime    MEDICATIONS  (PRN):  acetaminophen     Tablet .. 650 milliGRAM(s) Oral every 6 hours PRN Mild Pain (1 - 3)      Allergies    No Known Allergies    Intolerances    ROS  gen- no f/c, + weakness,  + dec appetite  heent- no sore throat, no epistaxis/gingival bleed  cv- no chest pain, + palpitation post inhaler  resp- + dyspnea, + productive cough  gi- no n/v/abd pain, +BM, + dark stool baseline, no BRBPR  gu- no hematuria  musculosk- + weakness  skin- erythema LLE  neuro- no numbness/tingling, +weakness  ROS otherwise reviewed and negative      Vital Signs Last 24 Hrs  T(C): 36.4 (19 Apr 2024 12:33), Max: 36.5 (18 Apr 2024 18:20)  T(F): 97.6 (19 Apr 2024 12:33), Max: 97.7 (18 Apr 2024 18:20)  HR: 86 (19 Apr 2024 12:33) (78 - 88)  BP: 121/70 (19 Apr 2024 12:33) (108/70 - 135/60)  BP(mean): 80 (18 Apr 2024 19:01) (73 - 81)  RR: 18 (19 Apr 2024 12:33) (18 - 20)  SpO2: 97% (19 Apr 2024 12:33) (91% - 97%)    Parameters below as of 19 Apr 2024 12:33  Patient On (Oxygen Delivery Method): room air        PHYSICAL EXAM  General: elderly adult in NAD, chronically ill appearing  HEENT: clear oropharynx, anicteric sclera, pink conjunctiva  Neck: supple  CV: normal S1/S2  Lungs: decreased BS  Abdomen: soft non-tender non-distended, no hepatosplenomegaly, positive bowel sounds  Ext: no calf tenderness, + edema bilateral  Skin: erythema LLE  Lymph Nodes: No LAD in axillae, groin, neck  Neuro: alert and oriented X 3    LABS:                          7.7    52.18 )-----------( 168      ( 19 Apr 2024 06:59 )             22.1         Mean Cell Volume : 98.2 fl  Mean Cell Hemoglobin : 34.2 pg  Mean Cell Hemoglobin Concentration : 34.8 gm/dL  Auto Neutrophil # : x  Auto Lymphocyte # : x  Auto Monocyte # : x  Auto Eosinophil # : x  Auto Basophil # : x  Auto Neutrophil % : x  Auto Lymphocyte % : x  Auto Monocyte % : x  Auto Eosinophil % : x  Auto Basophil % : x      Serial CBC's  04-19 @ 06:59  Hct-22.1 / Hgb-7.7 / Plat-168 / RBC-2.25 / WBC-52.18  Serial CBC's  04-18 @ 12:59  Hct-27.4 / Hgb-8.8 / Plat-183 / RBC-2.86 / WBC-53.23      04-19    137  |  103  |  42<H>  ----------------------------<  67<L>  4.4   |  23  |  1.48<H>    Ca    10.9<H>      19 Apr 2024 06:53  Phos  2.8     04-19  Mg     2.7     04-19    TPro  6.1  /  Alb  3.1<L>  /  TBili  0.5  /  DBili  x   /  AST  39  /  ALT  24  /  AlkPhos  191<H>  04-18          Folate, Serum: 19.8 ng/mL (04-19 @ 06:59)  Vitamin B12, Serum: 1573 pg/mL (04-19 @ 06:59)        Radiology:    < from: [facility] Duplex Lower Ext Vein Scan, Bilat (04.18.24 @ 22:20) >    IMPRESSION:  No evidence of deep venous thrombosis in either lower extremity.      < from: POCUS ED Kidney and Bladder (04.18.24 @ 13:48) >  IMPRESSION:  No hydronephrosis present in the left and right kidneys.  Simple-appearing intraperitoneal fluid incidentally noted, recommend   clinical correlation.      < from: Xray Chest 1 View- PORTABLE-Urgent (Xray Chest 1 View- PORTABLE-Urgent .) (04.18.24 @ 13:38) >  IMPRESSION:  Cardiomegaly. Left pleural effusion and atelectasis. Mild vascular   congestion.

## 2024-04-19 NOTE — DIETITIAN INITIAL EVALUATION ADULT - WEIGHT FOR BMI (LBS)
Counseling and Referral of Other Preventative  (Italic type indicates deductible and co-insurance are waived)    Patient Name: Ashish Nunez  Today's Date: 8/11/2023    Health Maintenance       Date Due Completion Date    Shingles Vaccine (2 of 3) 11/19/2015 9/24/2015    COVID-19 Vaccine (5 - Pfizer series) 02/17/2023 10/17/2022    Influenza Vaccine (1) 09/01/2023 10/24/2022    TETANUS VACCINE 07/29/2025 7/29/2015    Lipid Panel 09/27/2027 9/27/2022        No orders of the defined types were placed in this encounter.      The following information is provided to all patients.  This information is to help you find resources for any of the problems found today that may be affecting your health:                Living healthy guide: www.[facility].[place].gov      Understanding Diabetes: www.diabetes.org      Eating healthy: www.cdc.gov/healthyweight      [facility] home safety checklist: www.cdc.gov/steadi/patient.html      Agency on Aging: www.goea.[place].gov      Alcoholics anonymous (AA): www.aa.org      Physical Activity: www.catalina.nih.gov/ht9olqn      Tobacco use: www.quitwithusla.org     
190

## 2024-04-19 NOTE — PHYSICAL THERAPY INITIAL EVALUATION ADULT - TRANSFER TRAINING, PT EVAL
GOAL: Patient will perform sit to stand transfers minAx1 at rolling walker with proper hand placement in 2 weeks. GOAL: Patient will perform sit to stand transfers minAx1 at rolling walker with proper hand placement in 3 weeks.

## 2024-04-19 NOTE — DIETITIAN INITIAL EVALUATION ADULT - PHYSCIAL ASSESSMENT
Weights:    Current Admission Weights:  - Dosing weight: 86.2 kg/ 190.1 pounds (4/18/24)  - Daily weight: None available     Weight History per Bear BAIRD:  - 88 kg/ 194.04 pounds (9/25/23), 93 kg/ 205 pounds (3/31/23)     Weight Change:  - Anticipated weight changes with furosemide use and edema during this hospital admission   - Will continue to monitor weight trends as available/able.     IBW: 178 pounds   %IBW: 107%

## 2024-04-19 NOTE — PROGRESS NOTE ADULT - SUBJECTIVE AND OBJECTIVE BOX
DATE OF SERVICE: 04-19-24 @ 14:02    Patient is a 83y old  Male who presents with a chief complaint of Chart Reviewed, Events Noted  "83-year-old male history of alcohol abuse asthma hyperlipidemia hypertension hypothyroid, CLL currently not being on chemo or radiation, coming in with increased creatinine and BUN in the outpatient setting."      (19 Apr 2024 10:36)      SUBJECTIVE / OVERNIGHT EVENTS:  No chest pain. No shortness of breath. No complaints. No events overnight.     MEDICATIONS  (STANDING):  albuterol/ipratropium for Nebulization 3 milliLiter(s) Nebulizer every 6 hours  aspirin 325 milliGRAM(s) Oral daily  buDESOnide    Inhalation Suspension 0.5 milliGRAM(s) Inhalation two times a day  famotidine    Tablet 20 milliGRAM(s) Oral daily  folic acid 1 milliGRAM(s) Oral daily  furosemide   Injectable 40 milliGRAM(s) IV Push daily  heparin   Injectable 5000 Unit(s) SubCutaneous every 8 hours  levothyroxine 25 MICROGram(s) Oral daily  multivitamin/minerals 1 Tablet(s) Oral daily  QUEtiapine 25 milliGRAM(s) Oral at bedtime  traZODone 50 milliGRAM(s) Oral at bedtime    MEDICATIONS  (PRN):  acetaminophen     Tablet .. 650 milliGRAM(s) Oral every 6 hours PRN Mild Pain (1 - 3)      Vital Signs Last 24 Hrs  T(C): 36.4 (19 Apr 2024 12:33), Max: 36.5 (18 Apr 2024 18:20)  T(F): 97.6 (19 Apr 2024 12:33), Max: 97.7 (18 Apr 2024 18:20)  HR: 86 (19 Apr 2024 12:33) (78 - 88)  BP: 121/70 (19 Apr 2024 12:33) (108/70 - 135/60)  BP(mean): 80 (18 Apr 2024 19:01) (73 - 81)  RR: 18 (19 Apr 2024 12:33) (18 - 20)  SpO2: 97% (19 Apr 2024 12:33) (91% - 97%)    Parameters below as of 19 Apr 2024 12:33  Patient On (Oxygen Delivery Method): room air      CAPILLARY BLOOD GLUCOSE        I&O's Summary      PHYSICAL EXAM:  GENERAL: NAD, well-developed  HEAD:  Atraumatic, Normocephalic  EYES: EOMI, PERRLA, conjunctiva and sclera clear  NECK: Supple, No JVD  CHEST/LUNG: Clear to auscultation bilaterally; No wheeze  HEART: Regular rate and rhythm; No murmurs, rubs, or gallops  ABDOMEN: Soft, Nontender, Nondistended; Bowel sounds present  EXTREMITIES:  2+ Peripheral Pulses, No clubbing, cyanosis, or edema  PSYCH: AAOx3  NEUROLOGY: non-focal  SKIN: No rashes or lesions    LABS:                        7.7    52.18 )-----------( 168      ( 19 Apr 2024 06:59 )             22.1     04-19    137  |  103  |  42<H>  ----------------------------<  67<L>  4.4   |  23  |  1.48<H>    Ca    10.9<H>      19 Apr 2024 06:53  Phos  2.8     04-19  Mg     2.7     04-19    TPro  6.1  /  Alb  3.1<L>  /  TBili  0.5  /  DBili  x   /  AST  39  /  ALT  24  /  AlkPhos  191<H>  04-18          Urinalysis Basic - ( 19 Apr 2024 06:53 )    Color: x / Appearance: x / SG: x / pH: x  Gluc: 67 mg/dL / Ketone: x  / Bili: x / Urobili: x   Blood: x / Protein: x / Nitrite: x   Leuk Esterase: x / RBC: x / WBC x   Sq Epi: x / Non Sq Epi: x / Bacteria: x    < from: TTE W or WO Ultrasound Enhancing Agent (04.19.24 @ 10:51) >  CONCLUSIONS:      1. Technically difficult image quality.   2. Left ventricular wall thickness is normal. Left ventricular systolic function is normal with an ejection fraction of 65 % by Swanson's method of disks. There are no regional wall motion abnormalities seen.   3. The left ventricular diastolic function is indeterminate, with elevated filling pressure.   4. Theright ventricle is not well visualized. Mildly enlarged right ventricular cavity size and normal systolic function.   5. The left atrium is severely dilated.   6. The right atrium is dilated.   7. No significant valvular disease.   8. No pericardial effusion seen.   9. Pulmonary artery systolic pressure could not be estimated.  10. Left pleural effusion noted.  11. No prior echocardiogram is available for comparison.      < end of copied text >      RADIOLOGY & ADDITIONAL TESTS:    Imaging Personally Reviewed:    Consultant(s) Notes Reviewed:      Care Discussed with Consultants/Other Providers:

## 2024-04-19 NOTE — PHYSICAL THERAPY INITIAL EVALUATION ADULT - LEVEL OF INDEPENDENCE, REHAB EVAL
maximum assist (25% patients effort) minimum assist (75% patients effort)/moderate assist (50% patients effort)

## 2024-04-19 NOTE — DIETITIAN INITIAL EVALUATION ADULT - REASON FOR ADMISSION
Chart Reviewed, Events Noted  "83-year-old male history of alcohol abuse asthma hyperlipidemia hypertension hypothyroid, CLL currently not being on chemo or radiation, coming in with increased creatinine and BUN in the outpatient setting."

## 2024-04-19 NOTE — PHYSICAL THERAPY INITIAL EVALUATION ADULT - ADDITIONAL COMMENTS
Pt reports that he lives in a pvt home with his wife +3 KIM and bedroom on the 2nd floor. Pt reports that he was ambulating independently short distances with a rolling walker and required assist with all ADLs from his wife and HHA for 25 hours a week. Pt owns a wheelchair, shower bench and grabs bars in his home for DME.

## 2024-04-19 NOTE — PHYSICAL THERAPY INITIAL EVALUATION ADULT - IMPAIRMENTS CONTRIBUTING IMPAIRED BED MOBILITY, REHAB EVAL
impaired balance/decreased flexibility/decreased strength impaired balance/cognition/decreased flexibility/decreased strength

## 2024-04-20 NOTE — PROGRESS NOTE ADULT - ASSESSMENT
83-year-old male history of alcohol abuse asthma hyperlipidemia hypertension hypothyroid, CLL , pw worsening  renal function  Assessment  Hypothyroidism: On Synthroid 25 mcg po daily prior, ?compliance, TSH elevated 78, rpt TFTs reveal severe hypothyroid, TPO AB pending , TSH 90 with low FT4 0.3 , was started on IV synthroid   HTN: on antihypertensive medications, monitored, asymptomatic.  CKD: Monitor labs/BMP, renal follow up         Discussed plan and management wit Dr Candie Schreiber MD  Cell: [phone]  Office: [phone]

## 2024-04-20 NOTE — PROGRESS NOTE ADULT - SUBJECTIVE AND OBJECTIVE BOX
DATE OF SERVICE: 04-20-24 @ 22:35    Patient is a 83y old  Male who presents with a chief complaint of Chart Reviewed, Events Noted  "83-year-old male history of alcohol abuse asthma hyperlipidemia hypertension hypothyroid, CLL currently not being on chemo or radiation, coming in with increased creatinine and BUN in the outpatient setting."      (19 Apr 2024 10:36)      SUBJECTIVE / OVERNIGHT EVENTS:  No chest pain. No shortness of breath. No complaints. No events overnight.     MEDICATIONS  (STANDING):  albuterol/ipratropium for Nebulization 3 milliLiter(s) Nebulizer every 6 hours  aspirin 325 milliGRAM(s) Oral daily  buDESOnide    Inhalation Suspension 0.5 milliGRAM(s) Inhalation two times a day  famotidine    Tablet 20 milliGRAM(s) Oral daily  folic acid 1 milliGRAM(s) Oral daily  furosemide   Injectable 40 milliGRAM(s) IV Push daily  heparin   Injectable 5000 Unit(s) SubCutaneous every 8 hours  levothyroxine Injectable 50 MICROGram(s) IV Push once  multivitamin/minerals 1 Tablet(s) Oral daily  QUEtiapine 25 milliGRAM(s) Oral daily  traZODone 50 milliGRAM(s) Oral at bedtime    MEDICATIONS  (PRN):  acetaminophen     Tablet .. 650 milliGRAM(s) Oral every 6 hours PRN Mild Pain (1 - 3)  guaiFENesin Oral Liquid (Sugar-Free) 100 milliGRAM(s) Oral every 6 hours PRN Cough      Vital Signs Last 24 Hrs  T(C): 36.3 (20 Apr 2024 16:52), Max: 36.7 (20 Apr 2024 08:30)  T(F): 97.3 (20 Apr 2024 16:52), Max: 98 (20 Apr 2024 08:30)  HR: 86 (20 Apr 2024 16:52) (83 - 86)  BP: 130/71 (20 Apr 2024 16:52) (119/70 - 131/69)  BP(mean): --  RR: 18 (20 Apr 2024 16:52) (18 - 18)  SpO2: 93% (20 Apr 2024 16:52) (93% - 95%)    Parameters below as of 20 Apr 2024 16:52  Patient On (Oxygen Delivery Method): room air      CAPILLARY BLOOD GLUCOSE        I&O's Summary    19 Apr 2024 07:01  -  20 Apr 2024 07:00  --------------------------------------------------------  IN: 240 mL / OUT: 400 mL / NET: -160 mL    20 Apr 2024 07:01  -  20 Apr 2024 22:35  --------------------------------------------------------  IN: 0 mL / OUT: 1201 mL / NET: -1201 mL        PHYSICAL EXAM:  GENERAL: NAD, well-developed  HEAD:  Atraumatic, Normocephalic  EYES: EOMI, PERRLA, conjunctiva and sclera clear  NECK: Supple, No JVD  CHEST/LUNG: Clear to auscultation bilaterally; No wheeze  HEART: Regular rate and rhythm; No murmurs, rubs, or gallops  ABDOMEN: Soft, Nontender, Nondistended; Bowel sounds present  EXTREMITIES:  2+ Peripheral Pulses, No clubbing, cyanosis, or edema  PSYCH: AAOx3  NEUROLOGY: non-focal  SKIN: No rashes or lesions    LABS:                        8.5    52.13 )-----------( 179      ( 20 Apr 2024 07:04 )             24.8     04-20    140  |  103  |  43<H>  ----------------------------<  74  4.4   |  25  |  1.67<H>    Ca    10.9<H>      20 Apr 2024 07:05  Phos  2.8     04-19  Mg     2.7     04-19            Urinalysis Basic - ( 20 Apr 2024 07:05 )    Color: x / Appearance: x / SG: x / pH: x  Gluc: 74 mg/dL / Ketone: x  / Bili: x / Urobili: x   Blood: x / Protein: x / Nitrite: x   Leuk Esterase: x / RBC: x / WBC x   Sq Epi: x / Non Sq Epi: x / Bacteria: x        RADIOLOGY & ADDITIONAL TESTS:    Imaging Personally Reviewed:    Consultant(s) Notes Reviewed:      Care Discussed with Consultants/Other Providers:

## 2024-04-20 NOTE — PROGRESS NOTE ADULT - SUBJECTIVE AND OBJECTIVE BOX
Patient is a 83y old  Male who presents with a chief complaint of rising WBC count and BUN/Cr   Chart Reviewed, Events Noted       (19 Apr 2024 10:36)       Pt is seen and examined  pt is awake and lying in bed/out of bed to chair  pt seems comfortable and denies any complaints at this time      REVIEW OF SYSTEMS:    CONSTITUTIONAL: No weakness, fevers or chills  EYES/ENT: No visual changes;  No vertigo or throat pain   NECK: No pain or stiffness  RESPIRATORY: No cough, wheezing, hemoptysis; No shortness of breath  CARDIOVASCULAR: No chest pain or palpitations  GASTROINTESTINAL: No abdominal or epigastric pain. No nausea, vomiting, or hematemesis; No diarrhea or constipation. No melena or hematochezia.  GENITOURINARY: No dysuria, frequency or hematuria  NEUROLOGICAL: No numbness or weakness  SKIN: No itching, burning, rashes, or lesions     MEDICATIONS  (STANDING):  albuterol/ipratropium for Nebulization 3 milliLiter(s) Nebulizer every 6 hours  aspirin 325 milliGRAM(s) Oral daily  buDESOnide    Inhalation Suspension 0.5 milliGRAM(s) Inhalation two times a day  famotidine    Tablet 20 milliGRAM(s) Oral daily  folic acid 1 milliGRAM(s) Oral daily  furosemide   Injectable 40 milliGRAM(s) IV Push daily  heparin   Injectable 5000 Unit(s) SubCutaneous every 8 hours  levothyroxine 25 MICROGram(s) Oral daily  multivitamin/minerals 1 Tablet(s) Oral daily  QUEtiapine 25 milliGRAM(s) Oral daily  traZODone 50 milliGRAM(s) Oral at bedtime    MEDICATIONS  (PRN):  acetaminophen     Tablet .. 650 milliGRAM(s) Oral every 6 hours PRN Mild Pain (1 - 3)  guaiFENesin Oral Liquid (Sugar-Free) 100 milliGRAM(s) Oral every 6 hours PRN Cough      Allergies    No Known Allergies    Intolerances        Vital Signs Last 24 Hrs  T(C): 36.4 (20 Apr 2024 00:37), Max: 36.5 (19 Apr 2024 16:09)  T(F): 97.6 (20 Apr 2024 00:37), Max: 97.7 (19 Apr 2024 16:09)  HR: 83 (20 Apr 2024 00:37) (83 - 87)  BP: 131/69 (20 Apr 2024 00:37) (121/70 - 135/67)  BP(mean): --  RR: 18 (20 Apr 2024 00:37) (18 - 18)  SpO2: 95% (20 Apr 2024 00:37) (95% - 98%)    Parameters below as of 20 Apr 2024 00:37  Patient On (Oxygen Delivery Method): room air        PHYSICAL EXAM  General: adult in NAD  HEENT: clear oropharynx, anicteric sclera, pink conjunctiva  Neck: supple  CV: normal S1/S2 with no murmur rubs or gallops  Lungs: positive air movement b/l ant lungs,clear to auscultation, no wheezes, no rales  Abdomen: soft non-tender non-distended, no hepatosplenomegaly  Ext: no clubbing cyanosis or edema  Skin: no rashes and no petechiae  Neuro: alert and oriented X 4, no focal deficits  LABS:                          8.5    52.13 )-----------( 179      ( 20 Apr 2024 07:04 )             24.8         Mean Cell Volume : 99.2 fl  Mean Cell Hemoglobin : 34.0 pg  Mean Cell Hemoglobin Concentration : 34.3 gm/dL  Auto Neutrophil # : x  Auto Lymphocyte # : x  Auto Monocyte # : x  Auto Eosinophil # : x  Auto Basophil # : x  Auto Neutrophil % : x  Auto Lymphocyte % : x  Auto Monocyte % : x  Auto Eosinophil % : x  Auto Basophil % : x    Serial CBC's  04-20 @ 07:04  Hct-24.8 / Hgb-8.5 / Plat-179 / RBC-2.50 / WBC-52.13    Serial CBC's  04-19 @ 06:59  Hct-22.1 / Hgb-7.7 / Plat-168 / RBC-2.25 / WBC-52.18      Serial CBC's  04-18 @ 12:59  Hct-27.4 / Hgb-8.8 / Plat-183 / RBC-2.86 / WBC-53.23    04-20    140  |  103  |  43<H>  ----------------------------<  74  4.4   |  25  |  1.67<H>    Ca    10.9<H>      20 Apr 2024 07:05  Phos  2.8     04-19  Mg     2.7     04-19    TPro  6.1  /  Alb  3.1<L>  /  TBili  0.5  /  DBili  x   /  AST  39  /  ALT  24  /  AlkPhos  191<H>  04-18        Lactate Dehydrogenase, Serum: 275 U/L (04-20 @ 07:05)    Folate, Serum: 19.8 ng/mL (04-19 @ 06:59)  Vitamin B12, Serum: 1573 pg/mL (04-19 @ 06:59)          RADIOLOGY & ADDITIONAL STUDIES:  No Imaging to review    Assessment               Patient is a 83y old  Male who presents with a chief complaint of rising WBC count and BUN/Cr, weakness     Chart Reviewed, Events Noted     (19 Apr 2024 10:36)     Pt is seen and examined  pt is awake and lying in bed  pt seems comfortable and denies any complaints at this time; notes he is feeling better      REVIEW OF SYSTEMS:    CONSTITUTIONAL: (+) weakness, no fevers or chills  EYES/ENT: No visual changes;  No vertigo or throat pain   NECK: No pain or stiffness  RESPIRATORY: No cough, wheezing, hemoptysis; No shortness of breath  CARDIOVASCULAR: No chest pain or palpitations  GASTROINTESTINAL: No abdominal or epigastric pain. No nausea, vomiting, or hematemesis; No diarrhea or constipation. No melena or hematochezia.  GENITOURINARY: No dysuria, frequency or hematuria  NEUROLOGICAL: No numbness or weakness  SKIN: No itching, burning, rashes, or lesions     MEDICATIONS  (STANDING):  albuterol/ipratropium for Nebulization 3 milliLiter(s) Nebulizer every 6 hours  aspirin 325 milliGRAM(s) Oral daily  buDESOnide    Inhalation Suspension 0.5 milliGRAM(s) Inhalation two times a day  famotidine    Tablet 20 milliGRAM(s) Oral daily  folic acid 1 milliGRAM(s) Oral daily  furosemide   Injectable 40 milliGRAM(s) IV Push daily  heparin   Injectable 5000 Unit(s) SubCutaneous every 8 hours  levothyroxine 25 MICROGram(s) Oral daily  multivitamin/minerals 1 Tablet(s) Oral daily  QUEtiapine 25 milliGRAM(s) Oral daily  traZODone 50 milliGRAM(s) Oral at bedtime    MEDICATIONS  (PRN):  acetaminophen     Tablet .. 650 milliGRAM(s) Oral every 6 hours PRN Mild Pain (1 - 3)  guaiFENesin Oral Liquid (Sugar-Free) 100 milliGRAM(s) Oral every 6 hours PRN Cough      Allergies    No Known Allergies    Intolerances        Vital Signs Last 24 Hrs  T(C): 36.4 (20 Apr 2024 00:37), Max: 36.5 (19 Apr 2024 16:09)  T(F): 97.6 (20 Apr 2024 00:37), Max: 97.7 (19 Apr 2024 16:09)  HR: 83 (20 Apr 2024 00:37) (83 - 87)  BP: 131/69 (20 Apr 2024 00:37) (121/70 - 135/67)  BP(mean): --  RR: 18 (20 Apr 2024 00:37) (18 - 18)  SpO2: 95% (20 Apr 2024 00:37) (95% - 98%)    Parameters below as of 20 Apr 2024 00:37  Patient On (Oxygen Delivery Method): room air        PHYSICAL EXAM  General: adult in NAD  HEENT: clear oropharynx, anicteric sclera, pink conjunctiva; (+) crusted blood on right nare;  Neck: supple  CV: normal S1/S2 with no murmur rubs or gallops  Lungs: positive air movement b/l ant lungs,clear to auscultation, no wheezes, no rales  Abdomen: soft non-tender mildly-distended, no hepatosplenomegaly appreciated  Ext: no clubbing cyanosis; 2(+) edema  Skin: multiple ecchymoses over upper extremities; onycholysis; fungal toenails; (+) PVD lower extremities; (+) crusting lesion on ant chest wall  Neuro: alert and oriented X 4, no focal deficits  Lymph nodes: no cervical, axillary or inguinal nodes appreciated    LABS:                        8.5    52.13 )-----------( 179      ( 20 Apr 2024 07:04 )             24.8     Mean Cell Volume : 99.2 fl  Mean Cell Hemoglobin : 34.0 pg  Mean Cell Hemoglobin Concentration : 34.3 gm/dL  Auto Neutrophil # : x  Auto Lymphocyte # : x  Auto Monocyte # : x  Auto Eosinophil # : x  Auto Basophil # : x  Auto Neutrophil % : x  Auto Lymphocyte % : x  Auto Monocyte % : x  Auto Eosinophil % : x  Auto Basophil % : x    Serial CBC's  04-20 @ 07:04  Hct-24.8 / Hgb-8.5 / Plat-179 / RBC-2.50 / WBC-52.13    Serial CBC's  04-19 @ 06:59  Hct-22.1 / Hgb-7.7 / Plat-168 / RBC-2.25 / WBC-52.18    Serial CBC's  04-18 @ 12:59  Hct-27.4 / Hgb-8.8 / Plat-183 / RBC-2.86 / WBC-53.23    04-20    140  |  103  |  43<H>  ----------------------------<  74  4.4   |  25  |  1.67<H>    Ca    10.9<H>      20 Apr 2024 07:05  Phos  2.8     04-19  Mg     2.7     04-19    TPro  6.1  /  Alb  3.1<L>  /  TBili  0.5  /  DBili  x   /  AST  39  /  ALT  24  /  AlkPhos  191<H>  04-18        Lactate Dehydrogenase, Serum: 275 U/L (04-20 @ 07:05)    Folate, Serum: 19.8 ng/mL (04-19 @ 06:59)  Vitamin B12, Serum: 1573 pg/mL (04-19 @ 06:59)      Thyroid Stimulating Hormone, Serum (04.19.24 @ 06:59)    Thyroid Stimulating Hormone, Serum: 78.60 uIU/mL          RADIOLOGY & ADDITIONAL STUDIES:  No Imaging to review    Assessment

## 2024-04-20 NOTE — PROGRESS NOTE ADULT - ASSESSMENT
83-year-old male history of alcohol abuse asthma hyperlipidemia hypertension hypothyroid, CLL currently not being on chemo or radiation, coming in with increased creatinine and BUN in the outpatient setting.  With no increased white count.  White count on 8/9 was 45, 9/25 it was 33, today it is 68.  Hemoglobin steady at 8.7.  Platelet count 180 today, last on 9/25 was 133.   BUN and creatinine on 8/9/23 was 0.91 and 15.  4- the creatinine is 1.8 with a BUN of 52.  Patient without fevers.  General chronic cough and SOB.  No chills. + fatigue. No belly pain.  Normal bowel movements.  Has been hydrating but was also placed on 2 diuretics and was told to stop them this week. + swollen bl LE     hypothyroid  - repeat tsh and free t4  - start synthroid  - endo consult following    MARY  - monitor cre  - avoid nephrotoxins  - nephrology consult    r/o pulm edema  - cardiomegaly  - TTE    COPD  - c/w duoneb  - inhaled steroids    lukas edema  - doppler neg for dvt    dvt px  - sq heparin  CLL  - hematology consult

## 2024-04-20 NOTE — PROGRESS NOTE ADULT - SUBJECTIVE AND OBJECTIVE BOX
Chief complaint  Patient is a 83y old  Male who presents with a chief complaint of Chart Reviewed, Events Noted  "83-year-old male history of alcohol abuse asthma hyperlipidemia hypertension hypothyroid, CLL currently not being on chemo or radiation, coming in with increased creatinine and BUN in the outpatient setting."      (19 Apr 2024 10:36)         Labs and Fingersticks  CAPILLARY BLOOD GLUCOSE          Anion Gap: 12 (04-20 @ 07:05)  Anion Gap: 11 (04-19 @ 06:53)      Calcium: 10.9 *H* (04-20 @ 07:05)  Calcium: 10.9 *H* (04-19 @ 06:53)  Intact PTH: 17 (04-20 @ 07:08)          04-20    140  |  103  |  43<H>  ----------------------------<  74  4.4   |  25  |  1.67<H>    Ca    10.9<H>      20 Apr 2024 07:05  Phos  2.8     04-19  Mg     2.7     04-19                          8.5    52.13 )-----------( 179      ( 20 Apr 2024 07:04 )             24.8     Medications  MEDICATIONS  (STANDING):  albuterol/ipratropium for Nebulization 3 milliLiter(s) Nebulizer every 6 hours  aspirin 325 milliGRAM(s) Oral daily  buDESOnide    Inhalation Suspension 0.5 milliGRAM(s) Inhalation two times a day  famotidine    Tablet 20 milliGRAM(s) Oral daily  folic acid 1 milliGRAM(s) Oral daily  furosemide   Injectable 40 milliGRAM(s) IV Push daily  heparin   Injectable 5000 Unit(s) SubCutaneous every 8 hours  levothyroxine Injectable 50 MICROGram(s) IV Push once  multivitamin/minerals 1 Tablet(s) Oral daily  QUEtiapine 25 milliGRAM(s) Oral daily  traZODone 50 milliGRAM(s) Oral at bedtime      Physical Exam  General: Patient comfortable in bed   Vital Signs Last 12 Hrs  T(F): 98 (04-20-24 @ 08:30), Max: 98 (04-20-24 @ 08:30)  HR: 86 (04-20-24 @ 08:30) (86 - 86)  BP: 119/70 (04-20-24 @ 08:30) (119/70 - 119/70)  BP(mean): --  RR: --  SpO2: --    CVS: S1S2   Respiratory: No wheezing, no crepitations  GI: Abdomen soft, bowel sounds positive

## 2024-04-21 NOTE — PROGRESS NOTE ADULT - ASSESSMENT
83-year-old male history of alcohol abuse asthma hyperlipidemia hypertension hypothyroid, CLL , pw worsening  renal function  Assessment  Hypothyroidism: On Synthroid 25 mcg po daily prior, ?compliance, was stopped outpatient as per family, unsure why, TSH elevated 78, rpt TFTs reveal severe hypothyroid, TPO AB pending , TSH 90 with low FT4 0.3 , was started on IV synthroid.  HTN: on antihypertensive medications, monitored, asymptomatic.  CKD: Monitor labs/BMP, renal follow up         Discussed plan and management wit Dr Candie Schreiber MD  Cell: [phone]  Office: [phone]

## 2024-04-21 NOTE — PROGRESS NOTE ADULT - NSPROGADDITIONALINFOA_GEN_ALL_CORE
Kassidy Adorno MD  [facility]  [phone]
Kassidy Adorno MD  [facility]  [phone]

## 2024-04-21 NOTE — PROGRESS NOTE ADULT - SUBJECTIVE AND OBJECTIVE BOX
DATE OF SERVICE: 04-21-24 @ 21:58    Patient is a 83y old  Male who presents with a chief complaint of Chart Reviewed, Events Noted  "83-year-old male history of alcohol abuse asthma hyperlipidemia hypertension hypothyroid, CLL currently not being on chemo or radiation, coming in with increased creatinine and BUN in the outpatient setting."      (19 Apr 2024 10:36)      SUBJECTIVE / OVERNIGHT EVENTS:  No chest pain. No shortness of breath. No complaints. No events overnight.     MEDICATIONS  (STANDING):  albuterol/ipratropium for Nebulization 3 milliLiter(s) Nebulizer every 6 hours  aspirin 325 milliGRAM(s) Oral daily  buDESOnide    Inhalation Suspension 0.5 milliGRAM(s) Inhalation two times a day  famotidine    Tablet 20 milliGRAM(s) Oral daily  folic acid 1 milliGRAM(s) Oral daily  furosemide   Injectable 40 milliGRAM(s) IV Push daily  heparin   Injectable 5000 Unit(s) SubCutaneous every 8 hours  levothyroxine Injectable 50 MICROGram(s) IV Push <User Schedule>  multivitamin/minerals 1 Tablet(s) Oral daily  piperacillin/tazobactam IVPB. 3.375 Gram(s) IV Intermittent once  QUEtiapine 25 milliGRAM(s) Oral daily  traZODone 50 milliGRAM(s) Oral at bedtime    MEDICATIONS  (PRN):  acetaminophen     Tablet .. 650 milliGRAM(s) Oral every 6 hours PRN Mild Pain (1 - 3)  guaiFENesin Oral Liquid (Sugar-Free) 100 milliGRAM(s) Oral every 6 hours PRN Cough      Vital Signs Last 24 Hrs  T(C): 36.2 (21 Apr 2024 16:43), Max: 36.5 (21 Apr 2024 09:00)  T(F): 97.1 (21 Apr 2024 16:43), Max: 97.7 (21 Apr 2024 09:00)  HR: 95 (21 Apr 2024 16:43) (83 - 95)  BP: 150/72 (21 Apr 2024 16:43) (125/69 - 150/72)  BP(mean): --  RR: 18 (21 Apr 2024 16:43) (18 - 18)  SpO2: 96% (21 Apr 2024 16:43) (93% - 98%)    Parameters below as of 21 Apr 2024 16:43  Patient On (Oxygen Delivery Method): room air      CAPILLARY BLOOD GLUCOSE        I&O's Summary    20 Apr 2024 07:01  -  21 Apr 2024 07:00  --------------------------------------------------------  IN: 0 mL / OUT: 1201 mL / NET: -1201 mL    21 Apr 2024 07:01  -  21 Apr 2024 21:58  --------------------------------------------------------  IN: 240 mL / OUT: 0 mL / NET: 240 mL        PHYSICAL EXAM:  GENERAL: NAD, well-developed  HEAD:  Atraumatic, Normocephalic  EYES: EOMI, PERRLA, conjunctiva and sclera clear  NECK: Supple, No JVD  CHEST/LUNG: Clear to auscultation bilaterally; No wheeze  HEART: Regular rate and rhythm; No murmurs, rubs, or gallops  ABDOMEN: Soft, Nontender, Nondistended; Bowel sounds present  EXTREMITIES:  2+ Peripheral Pulses, No clubbing, cyanosis, or edema  PSYCH: AAOx3  NEUROLOGY: non-focal  SKIN: No rashes or lesions    LABS:                        8.5    52.13 )-----------( 179      ( 20 Apr 2024 07:04 )             24.8     04-20    140  |  103  |  43<H>  ----------------------------<  74  4.4   |  25  |  1.67<H>    Ca    10.9<H>      20 Apr 2024 07:05            Urinalysis Basic - ( 20 Apr 2024 07:05 )    Color: x / Appearance: x / SG: x / pH: x  Gluc: 74 mg/dL / Ketone: x  / Bili: x / Urobili: x   Blood: x / Protein: x / Nitrite: x   Leuk Esterase: x / RBC: x / WBC x   Sq Epi: x / Non Sq Epi: x / Bacteria: x    < from: TTE W or WO Ultrasound Enhancing Agent (04.19.24 @ 10:51) >   1. Technically difficult image quality.   2. Left ventricular wall thickness is normal. Left ventricular systolic function is normal with an ejection fraction of 65 % by Swanson's method of disks. There are no regional wall motion abnormalities seen.   3. The left ventricular diastolic function is indeterminate, with elevated filling pressure.   4. Theright ventricle is not well visualized. Mildly enlarged right ventricular cavity size and normal systolic function.   5. The left atrium is severely dilated.   6. The right atrium is dilated.   7. No significant valvular disease.   8. No pericardial effusion seen.   9. Pulmonary artery systolic pressure could not be estimated.  10. Left pleural effusion noted.  11. No prior echocardiogram is available for comparison.    < end of copied text >      RADIOLOGY & ADDITIONAL TESTS:    Imaging Personally Reviewed:    Consultant(s) Notes Reviewed:      Care Discussed with Consultants/Other Providers:

## 2024-04-21 NOTE — PROGRESS NOTE ADULT - SUBJECTIVE AND OBJECTIVE BOX
Chief complaint  Patient is a 83y old  Male who presents with a chief complaint of Chart Reviewed, Events Noted  "83-year-old male history of alcohol abuse asthma hyperlipidemia hypertension hypothyroid, CLL currently not being on chemo or radiation, coming in with increased creatinine and BUN in the outpatient setting."      (19 Apr 2024 10:36)         Labs and Fingersticks  CAPILLARY BLOOD GLUCOSE          Anion Gap: 12 (04-20 @ 07:05)      Calcium: 10.9 *H* (04-20 @ 07:05)  Intact PTH: 17 (04-20 @ 07:08)          04-20    140  |  103  |  43<H>  ----------------------------<  74  4.4   |  25  |  1.67<H>    Ca    10.9<H>      20 Apr 2024 07:05                          8.5    52.13 )-----------( 179      ( 20 Apr 2024 07:04 )             24.8     Medications  MEDICATIONS  (STANDING):  albuterol/ipratropium for Nebulization 3 milliLiter(s) Nebulizer every 6 hours  aspirin 325 milliGRAM(s) Oral daily  buDESOnide    Inhalation Suspension 0.5 milliGRAM(s) Inhalation two times a day  famotidine    Tablet 20 milliGRAM(s) Oral daily  folic acid 1 milliGRAM(s) Oral daily  furosemide   Injectable 40 milliGRAM(s) IV Push daily  heparin   Injectable 5000 Unit(s) SubCutaneous every 8 hours  levothyroxine Injectable 50 MICROGram(s) IV Push <User Schedule>  multivitamin/minerals 1 Tablet(s) Oral daily  QUEtiapine 25 milliGRAM(s) Oral daily  traZODone 50 milliGRAM(s) Oral at bedtime      Physical Exam  General: Patient comfortable in bed   Vital Signs Last 12 Hrs  T(F): 97.7 (04-21-24 @ 09:00), Max: 97.7 (04-21-24 @ 09:00)  HR: 90 (04-21-24 @ 09:00) (90 - 90)  BP: 125/69 (04-21-24 @ 09:00) (125/69 - 125/69)  BP(mean): --  RR: 18 (04-21-24 @ 09:00) (18 - 18)  SpO2: 98% (04-21-24 @ 09:00) (98% - 98%)    CVS: S1S2   Respiratory: No wheezing, no crepitations  GI: Abdomen soft, bowel sounds positive  Musculoskeletal:  moves all extremities  : Voiding

## 2024-04-21 NOTE — PROGRESS NOTE ADULT - SUBJECTIVE AND OBJECTIVE BOX
Patient is a 83y old  Male who presents with a chief complaint of fatigue.     Chart Reviewed, Events Noted  Pt w/ h/o CLL on observation only; no new events over last 24 hours        Pt is seen and examined  pt is awake and lying in bed  pt seems comfortable and denies any complaints at this time      REVIEW OF SYSTEMS:    CONSTITUTIONAL: No weakness, fevers or chills  EYES/ENT: No visual changes;  No vertigo or throat pain   NECK: No pain or stiffness  RESPIRATORY: No cough, wheezing, hemoptysis; No shortness of breath  CARDIOVASCULAR: No chest pain or palpitations  GASTROINTESTINAL: No abdominal or epigastric pain. No nausea, vomiting, or hematemesis; No diarrhea or constipation. No melena or hematochezia.  GENITOURINARY: No dysuria, frequency or hematuria  NEUROLOGICAL: No numbness or weakness  SKIN: No itching, burning, rashes, or lesions     MEDICATIONS  (STANDING):  albuterol/ipratropium for Nebulization 3 milliLiter(s) Nebulizer every 6 hours  aspirin 325 milliGRAM(s) Oral daily  buDESOnide    Inhalation Suspension 0.5 milliGRAM(s) Inhalation two times a day  famotidine    Tablet 20 milliGRAM(s) Oral daily  folic acid 1 milliGRAM(s) Oral daily  furosemide   Injectable 40 milliGRAM(s) IV Push daily  heparin   Injectable 5000 Unit(s) SubCutaneous every 8 hours  levothyroxine Injectable 50 MICROGram(s) IV Push once  levothyroxine Injectable 50 MICROGram(s) IV Push <User Schedule>  multivitamin/minerals 1 Tablet(s) Oral daily  QUEtiapine 25 milliGRAM(s) Oral daily  traZODone 50 milliGRAM(s) Oral at bedtime    MEDICATIONS  (PRN):  acetaminophen     Tablet .. 650 milliGRAM(s) Oral every 6 hours PRN Mild Pain (1 - 3)  guaiFENesin Oral Liquid (Sugar-Free) 100 milliGRAM(s) Oral every 6 hours PRN Cough      Allergies    No Known Allergies    Intolerances        Vital Signs Last 24 Hrs  T(C): 36.5 (21 Apr 2024 09:00), Max: 36.5 (21 Apr 2024 09:00)  T(F): 97.7 (21 Apr 2024 09:00), Max: 97.7 (21 Apr 2024 09:00)  HR: 90 (21 Apr 2024 09:00) (83 - 90)  BP: 125/69 (21 Apr 2024 09:00) (125/69 - 148/74)  BP(mean): --  RR: 18 (21 Apr 2024 09:00) (18 - 18)  SpO2: 98% (21 Apr 2024 09:00) (93% - 98%)    Parameters below as of 21 Apr 2024 09:00  Patient On (Oxygen Delivery Method): room air        PHYSICAL EXAM  General: adult in NAD  HEENT: clear oropharynx, anicteric sclera, pink conjunctiva  Neck: supple  CV: normal S1/S2 with no murmur rubs or gallops  Lungs: positive air movement b/l ant lungs,clear to auscultation, no wheezes, no rales  Abdomen: soft non-tender non-distended, no hepatosplenomegaly  Ext: no clubbing cyanosis; 1(+) edema  Skin: no rashes and no petechiae; (+) bruising  Neuro: alert and oriented X 4, no focal deficits      LABS:  No New labs today                          8.5    52.13 )-----------( 179      ( 20 Apr 2024 07:04 )             24.8         Mean Cell Volume : 99.2 fl  Mean Cell Hemoglobin : 34.0 pg  Mean Cell Hemoglobin Concentration : 34.3 gm/dL  Auto Neutrophil # : x  Auto Lymphocyte # : x  Auto Monocyte # : x  Auto Eosinophil # : x  Auto Basophil # : x  Auto Neutrophil % : x  Auto Lymphocyte % : x  Auto Monocyte % : x  Auto Eosinophil % : x  Auto Basophil % : x    Serial CBC's  04-20 @ 07:04  Hct-24.8 / Hgb-8.5 / Plat-179 / RBC-2.50 / WBC-52.13          Serial CBC's  04-19 @ 06:59  Hct-22.1 / Hgb-7.7 / Plat-168 / RBC-2.25 / WBC-52.18          Serial CBC's  04-18 @ 12:59  Hct-27.4 / Hgb-8.8 / Plat-183 / RBC-2.86 / WBC-53.23            04-20    140  |  103  |  43<H>  ----------------------------<  74  4.4   |  25  |  1.67<H>    Ca    10.9<H>      20 Apr 2024 07:05        Lactate Dehydrogenase, Serum: 275 U/L (04-20 @ 07:05)  Haptoglobin, Serum: 217 mg/dL (04-20 @ 07:05)    Folate, Serum: 19.8 ng/mL (04-19 @ 06:59)  Vitamin B12, Serum: 1573 pg/mL (04-19 @ 06:59)          RADIOLOGY & ADDITIONAL STUDIES:    No new radiology exams    Assessment

## 2024-04-21 NOTE — PROGRESS NOTE ADULT - ASSESSMENT
83-year-old male history of alcohol abuse asthma hyperlipidemia hypertension hypothyroid, CLL currently not being on chemo or radiation, coming in with increased creatinine and BUN in the outpatient setting.  With no increased white count.  White count on 8/9 was 45, 9/25 it was 33, today it is 68.  Hemoglobin steady at 8.7.  Platelet count 180 today, last on 9/25 was 133.   BUN and creatinine on 8/9/23 was 0.91 and 15.  4- the creatinine is 1.8 with a BUN of 52.  Patient without fevers.  General chronic cough and SOB.  No chills. + fatigue. No belly pain.  Normal bowel movements.  Has been hydrating but was also placed on 2 diuretics and was told to stop them this week. + swollen bl LE     hypothyroid  - repeat tsh and free t4  - start synthroid  - endo consult following    MARY  - monitor cre  - avoid nephrotoxins  - nephrology consult    r/o pulm edema  - cardiomegaly  - TTE done    COPD  - c/w duoneb  - inhaled steroids    lukas edema  - doppler neg for dvt    dvt px  - sq heparin    CLL  - hematology consult

## 2024-04-22 NOTE — PROGRESS NOTE ADULT - ASSESSMENT
83M with CLL not on treatment, iron deficiency anemia, history of alcohol abuse, asthma, HLD, HTN, hypothyroid with cough, weakness, increased BUN/Cr    #CLL  - not on treatment, follows with Dr. Lainez as outpatient  - leukocytosis, lymphocyte predominant- higher than recent baseline but WBC has been at this level in past  - WBC likely higher with acute illness  - plt normal and no evidence of hemolysis  - no need for treatment    #Anemia, normocytic  - lower than prior- likely in setting of acute illness, renal dysfunction, also with hypothyroid and very elevated TSH  - no overt bleeding, check stool occult  - iron studies 4/18 as outpt: ferritin 346, iron 21, iron sat 9; B12/folate elevated  - on oral iron at baseline  - bili is normal, LDH sl elevated, haptoglobin elevated- not c/w hemolysis  - Hg stable    #hypercalcemia likely in setting of volume depletion- improved from admission, PTH normal, PTHrp pending    endocrinology following- on synthroid    d/w wife bedside

## 2024-04-22 NOTE — PROGRESS NOTE ADULT - SUBJECTIVE AND OBJECTIVE BOX
Chief Complaint:  FU CLL    History of Present Illness:  seated in chair; had difficulty standing prior- felt weak; no f/c, no cp, + productive cough at baseline- no hemoptysis; no n/v/abd pain, +poor appetite; no bleeding noted; wife at bedside      MEDICATIONS  (STANDING):  albuterol/ipratropium for Nebulization 3 milliLiter(s) Nebulizer every 6 hours  aspirin 325 milliGRAM(s) Oral daily  buDESOnide    Inhalation Suspension 0.5 milliGRAM(s) Inhalation two times a day  chlorhexidine 2% Cloths 1 Application(s) Topical daily  famotidine    Tablet 20 milliGRAM(s) Oral daily  folic acid 1 milliGRAM(s) Oral daily  furosemide   Injectable 40 milliGRAM(s) IV Push daily  heparin   Injectable 5000 Unit(s) SubCutaneous every 8 hours  levothyroxine Injectable 50 MICROGram(s) IV Push <User Schedule>  multivitamin/minerals 1 Tablet(s) Oral daily  piperacillin/tazobactam IVPB.. 3.375 Gram(s) IV Intermittent every 8 hours  QUEtiapine 25 milliGRAM(s) Oral daily  traZODone 50 milliGRAM(s) Oral at bedtime    MEDICATIONS  (PRN):  acetaminophen     Tablet .. 650 milliGRAM(s) Oral every 6 hours PRN Mild Pain (1 - 3)  guaiFENesin Oral Liquid (Sugar-Free) 100 milliGRAM(s) Oral every 6 hours PRN Cough      Allergies    No Known Allergies    Intolerances        Vital Signs Last 24 Hrs  T(C): 36.3 (22 Apr 2024 08:57), Max: 36.4 (22 Apr 2024 00:46)  T(F): 97.4 (22 Apr 2024 08:57), Max: 97.5 (22 Apr 2024 00:46)  HR: 81 (22 Apr 2024 08:57) (81 - 99)  BP: 128/61 (22 Apr 2024 12:14) (118/72 - 150/72)  BP(mean): --  RR: 18 (22 Apr 2024 08:57) (18 - 18)  SpO2: 92% (22 Apr 2024 08:57) (92% - 96%)    Parameters below as of 22 Apr 2024 08:57  Patient On (Oxygen Delivery Method): room air        PHYSICAL EXAM  General: elderly adult in NAD  HEENT: clear oropharynx, anicteric sclera, pink conjunctiva  Neck: supple  CV: normal S1/S2  Lungs: clear to auscultation, no wheezes, no rales  Abdomen: soft non-tender non-distended, positive bowel sounds  Ext: no calf tenderness, + edema, decreased erythema  Skin: no rashes and no petechiae  Lymph Nodes: No LAD in neck  Neuro: alert and oriented     LABS:                          8.5    60.37 )-----------( 181      ( 22 Apr 2024 06:56 )             26.1         Mean Cell Volume : 100.0 fl  Mean Cell Hemoglobin : 32.6 pg  Mean Cell Hemoglobin Concentration : 32.6 gm/dL  Auto Neutrophil # : 10.56 K/uL  Auto Lymphocyte # : 46.06 K/uL  Auto Monocyte # : 0.00 K/uL  Auto Eosinophil # : 0.54 K/uL  Auto Basophil # : 0.54 K/uL  Auto Neutrophil % : 17.5 %  Auto Lymphocyte % : 76.3 %  Auto Monocyte % : 0.0 %  Auto Eosinophil % : 0.9 %  Auto Basophil % : 0.9 %      Serial CBC's  04-22 @ 06:56  Hct-26.1 / Hgb-8.5 / Plat-181 / RBC-2.61 / WBC-60.37  Serial CBC's  04-20 @ 07:04  Hct-24.8 / Hgb-8.5 / Plat-179 / RBC-2.50 / WBC-52.13  Serial CBC's  04-19 @ 06:59  Hct-22.1 / Hgb-7.7 / Plat-168 / RBC-2.25 / WBC-52.18      04-22    143  |  104  |  31<H>  ----------------------------<  82  4.3   |  27  |  1.54<H>    Ca    10.9<H>      22 Apr 2024 07:01  Mg     2.4     04-22            Folate, Serum: 19.8 ng/mL (04-19 @ 06:59)  Vitamin B12, Serum: 1573 pg/mL (04-19 @ 06:59)          04-20 @ 07:05    ldh1 --  haptoglobin 217  HAYDER--  uric acid--      Radiology:

## 2024-04-22 NOTE — CHART NOTE - NSCHARTNOTEFT_GEN_A_CORE
Reported by RN, patient's WBC 52.13. Patient admitted with elevated (chronic) wbc. Hx CLL. Hem following.  Heavenly Cui NP  Medicine ACP
Notified by RN, patient with uptrending leukocytosis 52.13 -> 60.37. Patient awake and alert, sitting up in bed, states he feels a little better and is comfortable, eager to eat breakfast. No signs of distress. Vitals stable. No acute complaints at this time. Discussed with on-call hematologist at [facility], who advised likely normal fluctuation in CLL with possible contribution of acute illness, continues with no need for hematological treatment at this time. Heme/onc to follow. Discussed with Dr. Camargo, plan as below.     > Monitor for infectious signs/symptoms.   > Continue Empiric zosyn x total 5 days treatment.   > Continue to monitor closely.   > Patient status and events to be signed out to night ACP for continued management and care.     Sophie So PA-C

## 2024-04-22 NOTE — PROGRESS NOTE ADULT - ASSESSMENT
83-year-old male history of alcohol abuse asthma hyperlipidemia hypertension hypothyroid, CLL currently not being on chemo or radiation, coming in with increased creatinine and BUN in the outpatient setting.  With no increased white count.  White count on 8/9 was 45, 9/25 it was 33, today it is 68.  Hemoglobin steady at 8.7.  Platelet count 180 today, last on 9/25 was 133.   BUN and creatinine on 8/9/23 was 0.91 and 15.  4- the creatinine is 1.8 with a BUN of 52.  Patient without fevers.  General chronic cough and SOB.  No chills. + fatigue. No belly pain.  Normal bowel movements.  Has been hydrating but was also placed on 2 diuretics and was told to stop them this week. + swollen bl LE     hypothyroid  - abnormal repeat tsh and free t4  - cont  synthroid  - endo consult following    MARY  - monitor cre  - avoid nephrotoxins  - nephrology consult    r/o pulm edema  - cardiomegaly  - TTE done    COPD  - c/w duoneb  - inhaled steroids    lukas edema  - doppler neg for dvt    dvt px  - sq heparin    CLL  - hematology consult    d/c planning

## 2024-04-22 NOTE — PROGRESS NOTE ADULT - SUBJECTIVE AND OBJECTIVE BOX
Chief complaint  Patient is a 83y old  Male who presents with a chief complaint of Chart Reviewed, Events Noted  "83-year-old male history of alcohol abuse asthma hyperlipidemia hypertension hypothyroid, CLL currently not being on chemo or radiation, coming in with increased creatinine and BUN in the outpatient setting."      (19 Apr 2024 10:36)         Labs and Fingersticks  CAPILLARY BLOOD GLUCOSE          Anion Gap: 12 (04-22 @ 07:01)      Calcium: 10.9 *H* (04-22 @ 07:01)          04-22    143  |  104  |  31<H>  ----------------------------<  82  4.3   |  27  |  1.54<H>    Ca    10.9<H>      22 Apr 2024 07:01  Mg     2.4     04-22                          8.5    60.37 )-----------( 181      ( 22 Apr 2024 06:56 )             26.1     Medications  MEDICATIONS  (STANDING):  albuterol/ipratropium for Nebulization 3 milliLiter(s) Nebulizer every 6 hours  aspirin 325 milliGRAM(s) Oral daily  buDESOnide    Inhalation Suspension 0.5 milliGRAM(s) Inhalation two times a day  chlorhexidine 2% Cloths 1 Application(s) Topical daily  famotidine    Tablet 20 milliGRAM(s) Oral daily  folic acid 1 milliGRAM(s) Oral daily  furosemide   Injectable 40 milliGRAM(s) IV Push daily  heparin   Injectable 5000 Unit(s) SubCutaneous every 8 hours  levothyroxine Injectable 50 MICROGram(s) IV Push <User Schedule>  multivitamin/minerals 1 Tablet(s) Oral daily  piperacillin/tazobactam IVPB.. 3.375 Gram(s) IV Intermittent every 8 hours  QUEtiapine 25 milliGRAM(s) Oral daily  traZODone 50 milliGRAM(s) Oral at bedtime      Physical Exam  General: Patient comfortable in bed   Vital Signs Last 12 Hrs  T(F): 97.4 (04-22-24 @ 08:57), Max: 97.4 (04-22-24 @ 08:57)  HR: 81 (04-22-24 @ 08:57) (81 - 81)  BP: 128/61 (04-22-24 @ 12:14) (118/72 - 128/61)  BP(mean): --  RR: 18 (04-22-24 @ 08:57) (18 - 18)  SpO2: 92% (04-22-24 @ 08:57) (92% - 92%)    CVS: S1S2   Respiratory: No wheezing, no crepitations  GI: Abdomen soft, bowel sounds positive  Musculoskeletal:  moves all extremities  : Voiding

## 2024-04-22 NOTE — PROGRESS NOTE ADULT - ASSESSMENT
83-year-old male history of alcohol abuse asthma hyperlipidemia hypertension hypothyroid, CLL , pw worsening  renal function  Assessment  Hypothyroidism: On Synthroid 25 mcg po daily prior, ?compliance, was stopped outpatient as per family, unsure why, TSH elevated 78, rpt TFTs reveal severe hypothyroid, TPO AB pending , TSH 90 with low FT4 0.3 , was started on IV synthroid.  Plan is to discharge on 75 mcg synthroid   HTN: on antihypertensive medications, monitored, asymptomatic.  CKD: Monitor labs/BMP, renal follow up         Discussed plan and management wit Dr Candie Schreiber MD  Cell: [phone]  Office: [phone]             83-year-old male history of alcohol abuse asthma hyperlipidemia hypertension hypothyroid, CLL , pw worsening  renal function  Assessment  Hypothyroidism: On Synthroid 25 mcg po daily prior, ?compliance, was stopped outpatient as per family, unsure why, TSH elevated 78, rpt TFTs reveal severe hypothyroid, TPO AB pending , TSH 90 with low FT4 0.3 , was started on IV synthroid.  Plan is to discharge on 75 mcg synthroid   HTN: on antihypertensive medications, monitored, asymptomatic.  CKD: Monitor labs/BMP, renal follow up         Discussed plan and management wit Dr troy Gaffney NP  Yoni Troy RODRIGUEZ  [phone]

## 2024-04-22 NOTE — PROGRESS NOTE ADULT - SUBJECTIVE AND OBJECTIVE BOX
DATE OF SERVICE: 04-22-24 @ 17:06    Patient is a 83y old  Male who presents with a chief complaint of Chart Reviewed, Events Noted  "83-year-old male history of alcohol abuse asthma hyperlipidemia hypertension hypothyroid, CLL currently not being on chemo or radiation, coming in with increased creatinine and BUN in the outpatient setting."      (19 Apr 2024 10:36)      SUBJECTIVE / OVERNIGHT EVENTS:  No chest pain. No shortness of breath. No complaints. No events overnight.     MEDICATIONS  (STANDING):  albuterol/ipratropium for Nebulization 3 milliLiter(s) Nebulizer every 6 hours  aspirin 325 milliGRAM(s) Oral daily  buDESOnide    Inhalation Suspension 0.5 milliGRAM(s) Inhalation two times a day  chlorhexidine 2% Cloths 1 Application(s) Topical daily  famotidine    Tablet 20 milliGRAM(s) Oral daily  folic acid 1 milliGRAM(s) Oral daily  furosemide   Injectable 40 milliGRAM(s) IV Push daily  heparin   Injectable 5000 Unit(s) SubCutaneous every 8 hours  levothyroxine Injectable 50 MICROGram(s) IV Push <User Schedule>  multivitamin/minerals 1 Tablet(s) Oral daily  piperacillin/tazobactam IVPB.. 3.375 Gram(s) IV Intermittent every 8 hours  QUEtiapine 25 milliGRAM(s) Oral daily  traZODone 50 milliGRAM(s) Oral at bedtime    MEDICATIONS  (PRN):  acetaminophen     Tablet .. 650 milliGRAM(s) Oral every 6 hours PRN Mild Pain (1 - 3)  guaiFENesin Oral Liquid (Sugar-Free) 100 milliGRAM(s) Oral every 6 hours PRN Cough      Vital Signs Last 24 Hrs  T(C): 36.5 (22 Apr 2024 15:50), Max: 36.5 (22 Apr 2024 15:50)  T(F): 97.7 (22 Apr 2024 15:50), Max: 97.7 (22 Apr 2024 15:50)  HR: 80 (22 Apr 2024 15:50) (80 - 99)  BP: 120/67 (22 Apr 2024 15:50) (118/72 - 139/62)  BP(mean): --  RR: 18 (22 Apr 2024 15:50) (18 - 18)  SpO2: 96% (22 Apr 2024 15:50) (92% - 96%)    Parameters below as of 22 Apr 2024 15:50  Patient On (Oxygen Delivery Method): room air      CAPILLARY BLOOD GLUCOSE        I&O's Summary    21 Apr 2024 07:01  -  22 Apr 2024 07:00  --------------------------------------------------------  IN: 240 mL / OUT: 0 mL / NET: 240 mL    22 Apr 2024 07:01  -  22 Apr 2024 17:06  --------------------------------------------------------  IN: 100 mL / OUT: 0 mL / NET: 100 mL        PHYSICAL EXAM:  GENERAL: NAD, well-developed  HEAD:  Atraumatic, Normocephalic  EYES: EOMI, PERRLA, conjunctiva and sclera clear  NECK: Supple, No JVD  CHEST/LUNG: Clear to auscultation bilaterally; No wheeze  HEART: Regular rate and rhythm; No murmurs, rubs, or gallops  ABDOMEN: Soft, Nontender, Nondistended; Bowel sounds present  EXTREMITIES:  2+ Peripheral Pulses, No clubbing, cyanosis, or edema  PSYCH: AAOx3  NEUROLOGY: non-focal  SKIN: No rashes or lesions    LABS:                        8.5    60.37 )-----------( 181      ( 22 Apr 2024 06:56 )             26.1     04-22    143  |  104  |  31<H>  ----------------------------<  82  4.3   |  27  |  1.54<H>    Ca    10.9<H>      22 Apr 2024 07:01  Mg     2.4     04-22            Urinalysis Basic - ( 22 Apr 2024 07:01 )    Color: x / Appearance: x / SG: x / pH: x  Gluc: 82 mg/dL / Ketone: x  / Bili: x / Urobili: x   Blood: x / Protein: x / Nitrite: x   Leuk Esterase: x / RBC: x / WBC x   Sq Epi: x / Non Sq Epi: x / Bacteria: x        RADIOLOGY & ADDITIONAL TESTS:    Imaging Personally Reviewed:    Consultant(s) Notes Reviewed:      Care Discussed with Consultants/Other Providers:

## 2024-04-23 NOTE — DISCHARGE NOTE PROVIDER - NSDCFUSCHEDAPPT_GEN_ALL_CORE_FT
Manisha Preston  [facility]  [facility] [address] [facility]  Scheduled Appointment: 05/03/2024

## 2024-04-23 NOTE — DISCHARGE NOTE PROVIDER - HOSPITAL COURSE
Called patient, there was no answer left message asking patient to call back to confirm her video appointment for 04/16 @ 830 am with Dr Andrade.    HPI:  83-year-old male history of alcohol abuse asthma hyperlipidemia hypertension hypothyroid, CLL currently not being on chemo or radiation, coming in with increased creatinine and BUN in the outpatient setting.  With no increased white count.  White count on 8/9 was 45, 9/25 it was 33, today it is 68.  Hemoglobin steady at 8.7.  Platelet count 180 today, last on 9/25 was 133.   BUN and creatinine on 8/9/23 was 0.91 and 15.  4- the creatinine is 1.8 with a BUN of 52.  Patient without fevers.  General chronic cough and SOB.  No chills. + fatigue. No belly pain.  Normal bowel movements.  Has been hydrating but was also placed on 2 diuretics and was told to stop them this week. + swollen bl LE  (18 Apr 2024 19:34)    Hospital Course:      Important Medication Changes and Reason:    Active or Pending Issues Requiring Follow-up:    Advanced Directives:   [ ] Full code  [ ] DNR  [ ] Hospice    Discharge Diagnoses:         HPI:  83-year-old male history of alcohol abuse asthma hyperlipidemia hypertension hypothyroid, CLL currently not being on chemo or radiation, coming in with increased creatinine and BUN in the outpatient setting.  With no increased white count.  White count on 8/9 was 45, 9/25 it was 33, today it is 68.  Hemoglobin steady at 8.7.  Platelet count 180 today, last on 9/25 was 133.   BUN and creatinine on 8/9/23 was 0.91 and 15.  4- the creatinine is 1.8 with a BUN of 52.  Patient without fevers.  General chronic cough and SOB.  No chills. + fatigue. No belly pain.  Normal bowel movements.  Has been hydrating but was also placed on 2 diuretics and was told to stop them this week. + swollen bl LE  (18 Apr 2024 19:34)    Hospital Course:  Patient admitted for MARY; s/p IVF and diuretics on hold; creatinine now stable. Endo was consulted during admission for severe hypothyroidism; s/p IV synthroid, no   MARY; s/p IVF, diuretics on hold, creatinine stable  	Severe hypothyroidism; endo following, s/p IV synthroid, now on PO  	UCx w/ pseuodmonas; s/p zosyn, BCx NGTD  	CLL; heme/onc following, no treatment indicated   	Pleural effusion; s/p IV lasix, now on PO, TTE w/ EF 65%, s/p 2L NC  	LE edema; duplex neg DVT, c/w diuresis   	Hypercalcemia; likely i/s/o volume depletion, improving; SPEP/IF/FLC pending per heme/onc      Important Medication Changes and Reason:    Active or Pending Issues Requiring Follow-up:    Advanced Directives:   [ ] Full code  [ ] DNR  [ ] Hospice    Discharge Diagnoses:         HPI:  83-year-old male history of alcohol abuse asthma hyperlipidemia hypertension hypothyroid, CLL currently not being on chemo or radiation, coming in with increased creatinine and BUN in the outpatient setting.  With no increased white count.  White count on 8/9 was 45, 9/25 it was 33, today it is 68.  Hemoglobin steady at 8.7.  Platelet count 180 today, last on 9/25 was 133.   BUN and creatinine on 8/9/23 was 0.91 and 15.  4- the creatinine is 1.8 with a BUN of 52.  Patient without fevers.  General chronic cough and SOB.  No chills. + fatigue. No belly pain.  Normal bowel movements.  Has been hydrating but was also placed on 2 diuretics and was told to stop them this week. + swollen bl LE  (18 Apr 2024 19:34)    Hospital Course:  Patient admitted for MARY; s/p IVF; creatinine now stable. Endo was consulted during admission for severe hypothyroidism; s/p IV synthroid, now on oral  Hospital course complicated by pseudomonas growing in urine culture; patient completed a course of zosyn during admission, blood cultures were negative  Patient was received IV lasix for pleural effusion; TTE w/ EF 65%, was on 2L NC now saturating well on room air and transitioned back oral lasix   Heme/onc consulted for CLL; found to have hypercalcemia likely i/s/o volume depletion, labs sent, f/u outpatient    Important Medication Changes and Reason:  >PCP follow up within one week for further outpatient management   >Heme/onc follow up for further outpatient management    Active or Pending Issues Requiring Follow-up:    Advanced Directives:   [ ] Full code  [ ] DNR  [ ] Hospice    Discharge Diagnoses:         HPI:  83-year-old male history of alcohol abuse asthma hyperlipidemia hypertension hypothyroid, CLL currently not being on chemo or radiation, coming in with increased creatinine and BUN in the outpatient setting.  With no increased white count.  White count on 8/9 was 45, 9/25 it was 33, today it is 68.  Hemoglobin steady at 8.7.  Platelet count 180 today, last on 9/25 was 133.   BUN and creatinine on 8/9/23 was 0.91 and 15.  4- the creatinine is 1.8 with a BUN of 52.  Patient without fevers.  General chronic cough and SOB.  No chills. + fatigue. No belly pain.  Normal bowel movements.  Has been hydrating but was also placed on 2 diuretics and was told to stop them this week. + swollen bl LE  (18 Apr 2024 19:34)    Hospital Course:  Patient admitted for MARY; s/p IVF; creatinine now stable. Endo was consulted during admission for severe hypothyroidism; s/p IV synthroid, now on oral  Hospital course complicated by pseudomonas growing in urine culture; patient completed a course of zosyn during admission, blood cultures were negative  Patient was received IV lasix for pleural effusion; TTE w/ EF 65%, was on 2L NC now saturating well on room air and transitioned back oral lasix   Heme/onc consulted for CLL; found to have hypercalcemia likely i/s/o volume depletion, labs sent, f/u outpatient    Important Medication Changes and Reason:  >Continue synthroid as prescribed for hypothyroidism    Active or Pending Issues Requiring Follow-up:  >PCP follow up within one week for further outpatient management   >Heme/onc follow up for further outpatient management  >Endo follow up for further management of hypothyroidism     Advanced Directives:   [X] Full code  [ ] DNR  [ ] Hospice    Discharge Diagnoses:  >MARY   >Hypothyroidism  >UTI   >Pleural effusion   >Hypercalcemia     Discharge/dispo/med rec discussed with medical attending Dr. Isabel. Patient is medically cleared for discharge with outpatient follow up

## 2024-04-23 NOTE — PROGRESS NOTE ADULT - ASSESSMENT
83-year-old male history of alcohol abuse asthma hyperlipidemia hypertension hypothyroid, CLL currently not being on chemo or radiation, coming in with increased creatinine and BUN in the outpatient setting.  With no increased white count.  White count on 8/9 was 45, 9/25 it was 33, today it is 68.  Hemoglobin steady at 8.7.  Platelet count 180 today, last on 9/25 was 133.   BUN and creatinine on 8/9/23 was 0.91 and 15.  4- the creatinine is 1.8 with a BUN of 52.  Patient without fevers.  General chronic cough and SOB.  No chills. + fatigue. No belly pain.  Normal bowel movements.  Has been hydrating but was also placed on 2 diuretics and was told to stop them this week. + swollen bl LE     hypothyroid  - abnormal repeat tsh and free t4  - cont  synthroid  - endo consult following    positive urine culture  - 50,000 - 99,000 CFU/mL Pseudomonas aeruginosa  - zosyn x 3 days    MARY  - monitor cre  - avoid nephrotoxins  - creatinine is stable    r/o pulm edema  - cardiomegaly  - TTE done    COPD  - c/w duoneb  - inhaled steroids    lukas edema  - doppler neg for dvt    dvt px  - sq heparin    CLL  - hematology consult    d/c planning to [facility]

## 2024-04-23 NOTE — PROGRESS NOTE ADULT - ASSESSMENT
83M with CLL not on treatment, iron deficiency anemia, history of alcohol abuse, asthma, HLD, HTN, hypothyroid with cough, weakness, increased BUN/Cr    For CLL not on treatment, follows with Dr. Lainez as outpatient   WBC likely higher with acute illness but has been in similar range in past  Plt normal and no evidence of hemolysis  No treatment as in patient    Anemia, normocytic, lower than prior- likely in setting of acute illness, renal dysfunction, also with hypothyroid and very elevated TSH  No evidence of def as outpatient, on oral iron at baseline  No hemolysis    Hypercalcemia likely in setting of volume depletion- improved from admission, PTH normal, PTHrp pending  Endocrinology following- on synthroid

## 2024-04-23 NOTE — PROGRESS NOTE ADULT - SUBJECTIVE AND OBJECTIVE BOX
HPI:  83-year-old male history of alcohol abuse asthma hyperlipidemia hypertension hypothyroid, CLL currently not being on chemo or radiation, admitted 4/18/24 with increased creatinine and BUN in the outpatient setting.     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 650 milliGRAM(s) Oral every 6 hours PRN Mild Pain (1 - 3)  albuterol/ipratropium for Nebulization 3 milliLiter(s) Nebulizer every 6 hours  aspirin 325 milliGRAM(s) Oral daily  buDESOnide    Inhalation Suspension 0.5 milliGRAM(s) Inhalation two times a day  chlorhexidine 2% Cloths 1 Application(s) Topical daily  famotidine    Tablet 20 milliGRAM(s) Oral daily  folic acid 1 milliGRAM(s) Oral daily  furosemide   Injectable 40 milliGRAM(s) IV Push daily  guaiFENesin Oral Liquid (Sugar-Free) 100 milliGRAM(s) Oral every 6 hours PRN Cough  heparin   Injectable 5000 Unit(s) SubCutaneous every 8 hours  levothyroxine Injectable 50 MICROGram(s) IV Push <User Schedule>  multivitamin/minerals 1 Tablet(s) Oral daily  piperacillin/tazobactam IVPB.. 3.375 Gram(s) IV Intermittent every 8 hours  QUEtiapine 25 milliGRAM(s) Oral daily  traZODone 50 milliGRAM(s) Oral at bedtime    Labs:  CBC Full  -  ( 23 Apr 2024 06:58 )  WBC Count : 73.40 K/uL  RBC Count : 2.72 M/uL  Hemoglobin : 8.5 g/dL  Hematocrit : 27.2 %  Platelet Count - Automated : 181 K/uL  Mean Cell Volume : 100.0 fl  Mean Cell Hemoglobin : 31.3 pg  Mean Cell Hemoglobin Concentration : 31.3 gm/dL  Auto Neutrophil # : 11.16 K/uL  Auto Lymphocyte # : 59.75 K/uL  Auto Monocyte # : 1.25 K/uL  Auto Eosinophil # : 1.25 K/uL  Auto Basophil # : 0.00 K/uL  Auto Neutrophil % : 15.2 %  Auto Lymphocyte % : 81.4 %  Auto Monocyte % : 1.7 %  Auto Eosinophil % : 1.7 %  Auto Basophil % : 0.0 %  WBC Count: 73.40 K/uL (04.23.24 @ 06:58)   WBC Count: 60.37 K/uL (04.22.24 @ 06:56)   WBC Count: 52.13 K/uL (04.20.24 @ 07:04)   WBC Count: 52.18 K/uL (04.19.24 @ 06:59)   WBC Count: 53.23 K/uL (04.18.24 @ 12:59)   WBC Count: 5.7 K/uL (07.11.16 @ 07:50)   Hemoglobin: 8.5 g/dL (04.23.24 @ 06:58)   Hemoglobin: 8.5 g/dL (04.22.24 @ 06:56)   Hemoglobin: 8.5 g/dL (04.20.24 @ 07:04)   Hemoglobin: 7.7 g/dL (04.19.24 @ 06:59)   Hemoglobin: 8.8 g/dL (04.18.24 @ 12:59)   Hemoglobin: 9.3: Specimen warmed prior to assay. g/dL (07.11.16 @ 07:50)   Hemoglobin: 11.2 g/dL (07.08.16 @ 12:03)   Hemoglobin: 11.1: Test Repeated g/dL (07.07.16 @ 11:23)     04-23    142  |  102  |  28<H>  ----------------------------<  90  4.0   |  29  |  1.58<H>    Ca    10.6<H>      23 Apr 2024 06:58  Mg     2.3     04-23    Creatinine, Serum: 0.89 mg/dL (07.11.16 @ 07:50)   Creatinine, Serum: 0.96 mg/dL (07.08.16 @ 12:03)     Radiology:             ROS:  Patient comfortable without distress  No SOB or chest pain  No palpitation  No abdominal pain, diarrhaea or constipation  General weakness  No skin changes or swelling of legs  Rest of the comprehensive ROS was negative  Vital Signs Last 24 Hrs  T(C): 36.3 (23 Apr 2024 08:06), Max: 36.6 (23 Apr 2024 00:28)  T(F): 97.4 (23 Apr 2024 08:06), Max: 97.8 (23 Apr 2024 00:28)  HR: 79 (23 Apr 2024 08:06) (72 - 80)  BP: 121/73 (23 Apr 2024 08:06) (115/65 - 128/61)  BP(mean): --  RR: 18 (23 Apr 2024 08:06) (17 - 18)  SpO2: 97% (23 Apr 2024 08:06) (95% - 97%)    Parameters below as of 23 Apr 2024 08:06  Patient On (Oxygen Delivery Method): nasal cannula  O2 Flow (L/min): 2      Physical exam:  Patient alert and oriented  No distress  CVS: S1, S2 regular or murmur  Chest: bilateral breath sound without rales  Abdomen: soft, not tender, no organomegaly or masses  CNS: No focal neuro deficit  Musculoskeletal:  Normal range of motion  Skin: No rash    Assessment and Plan: HPI:  83-year-old male history of alcohol abuse asthma hyperlipidemia hypertension hypothyroid, CLL currently not being on chemo or radiation, admitted 4/18/24 with increased creatinine and BUN in the outpatient setting.     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 650 milliGRAM(s) Oral every 6 hours PRN Mild Pain (1 - 3)  albuterol/ipratropium for Nebulization 3 milliLiter(s) Nebulizer every 6 hours  aspirin 325 milliGRAM(s) Oral daily  buDESOnide    Inhalation Suspension 0.5 milliGRAM(s) Inhalation two times a day  chlorhexidine 2% Cloths 1 Application(s) Topical daily  famotidine    Tablet 20 milliGRAM(s) Oral daily  folic acid 1 milliGRAM(s) Oral daily  furosemide   Injectable 40 milliGRAM(s) IV Push daily  guaiFENesin Oral Liquid (Sugar-Free) 100 milliGRAM(s) Oral every 6 hours PRN Cough  heparin   Injectable 5000 Unit(s) SubCutaneous every 8 hours  levothyroxine Injectable 50 MICROGram(s) IV Push <User Schedule>  multivitamin/minerals 1 Tablet(s) Oral daily  piperacillin/tazobactam IVPB.. 3.375 Gram(s) IV Intermittent every 8 hours  QUEtiapine 25 milliGRAM(s) Oral daily  traZODone 50 milliGRAM(s) Oral at bedtime    Labs:  CBC Full  -  ( 23 Apr 2024 06:58 )  WBC Count : 73.40 K/uL  RBC Count : 2.72 M/uL  Hemoglobin : 8.5 g/dL  Hematocrit : 27.2 %  Platelet Count - Automated : 181 K/uL  Mean Cell Volume : 100.0 fl  Mean Cell Hemoglobin : 31.3 pg  Mean Cell Hemoglobin Concentration : 31.3 gm/dL  Auto Neutrophil # : 11.16 K/uL  Auto Lymphocyte # : 59.75 K/uL  Auto Monocyte # : 1.25 K/uL  Auto Eosinophil # : 1.25 K/uL  Auto Basophil # : 0.00 K/uL  Auto Neutrophil % : 15.2 %  Auto Lymphocyte % : 81.4 %  Auto Monocyte % : 1.7 %  Auto Eosinophil % : 1.7 %  Auto Basophil % : 0.0 %  WBC Count: 73.40 K/uL (04.23.24 @ 06:58)   WBC Count: 60.37 K/uL (04.22.24 @ 06:56)   WBC Count: 52.13 K/uL (04.20.24 @ 07:04)   WBC Count: 52.18 K/uL (04.19.24 @ 06:59)   WBC Count: 53.23 K/uL (04.18.24 @ 12:59)   WBC Count: 5.7 K/uL (07.11.16 @ 07:50)   Hemoglobin: 8.5 g/dL (04.23.24 @ 06:58)   Hemoglobin: 8.5 g/dL (04.22.24 @ 06:56)   Hemoglobin: 8.5 g/dL (04.20.24 @ 07:04)   Hemoglobin: 7.7 g/dL (04.19.24 @ 06:59)   Hemoglobin: 8.8 g/dL (04.18.24 @ 12:59)   Hemoglobin: 9.3: Specimen warmed prior to assay. g/dL (07.11.16 @ 07:50)   Hemoglobin: 11.2 g/dL (07.08.16 @ 12:03)   Hemoglobin: 11.1: Test Repeated g/dL (07.07.16 @ 11:23)     04-23    142  |  102  |  28<H>  ----------------------------<  90  4.0   |  29  |  1.58<H>    Ca    10.6<H>      23 Apr 2024 06:58  Mg     2.3     04-23    Creatinine, Serum: 0.89 mg/dL (07.11.16 @ 07:50)   Creatinine, Serum: 0.96 mg/dL (07.08.16 @ 12:03)     Radiology:             ROS:  Patient comfortable without distress  No SOB or chest pain  No palpitation  No abdominal pain, diarrhaea or constipation  General weakness, sitting comfortable  No skin changes or swelling of legs  Rest of the comprehensive ROS was negative  Vital Signs Last 24 Hrs  T(C): 36.3 (23 Apr 2024 08:06), Max: 36.6 (23 Apr 2024 00:28)  T(F): 97.4 (23 Apr 2024 08:06), Max: 97.8 (23 Apr 2024 00:28)  HR: 79 (23 Apr 2024 08:06) (72 - 80)  BP: 121/73 (23 Apr 2024 08:06) (115/65 - 128/61)  BP(mean): --  RR: 18 (23 Apr 2024 08:06) (17 - 18)  SpO2: 97% (23 Apr 2024 08:06) (95% - 97%)    Parameters below as of 23 Apr 2024 08:06  Patient On (Oxygen Delivery Method): nasal cannula  O2 Flow (L/min): 2      Physical exam:  Patient alert and oriented  No distress  CVS: S1, S2 regular or murmur  Chest: bilateral breath sound without rales  Abdomen: soft, not tender, no organomegaly or masses  CNS: No focal neuro deficit  Musculoskeletal:  Normal range of motion  Skin: No rash    Assessment and Plan:

## 2024-04-23 NOTE — DISCHARGE NOTE PROVIDER - CARE PROVIDERS DIRECT ADDRESSES
[email or web address]

## 2024-04-23 NOTE — DISCHARGE NOTE PROVIDER - CARE PROVIDER_API CALL
López Gilbert  Nephrology  [address], [address]  [place] [address]  Phone: [phone]  Fax: [phone]  Follow Up Time: 1 week    Kurt Shcreiber)  Endocrinology/Metab/Diabetes  [address] [place] [address]  Phone: [phone]  Fax: [phone]  Follow Up Time:     Nicholas Lainez  Medical Oncology  [address], [address]  [place] [address]  Phone: [phone]  Fax: [phone]  Follow Up Time:

## 2024-04-23 NOTE — DISCHARGE NOTE PROVIDER - NSDCMRMEDTOKEN_GEN_ALL_CORE_FT
acetaminophen 325 mg oral tablet: 2 tab(s) orally as needed for pain  Albuterol (Eqv-ProAir HFA) 90 mcg/inh inhalation aerosol: 2 puff(s) inhaled every 6 hours as needed for  shortness of breath and/or wheezing  aspirin 325 mg oral tablet: 1 tab(s) orally once a day  budesonide 0.5 mg/2 mL inhalation suspension: 2 milliliter(s) by nebulizer 2 times a day as needed  famotidine 20 mg oral tablet: 1 tab(s) orally once a day  formoterol 20 mcg/2 mL inhalation solution: 2 milliliter(s) inhaled every 12 hours  furosemide 40 mg oral tablet: 1 tab(s) orally once a day  ipratropium-albuterol 0.5 mg-2.5 mg/3 mL inhalation solution: 3 milliliter(s) by nebulizer 4 times a day as needed  meloxicam 15 mg oral tablet: 1 tab(s) orally once a day  Multiple Vitamins oral tablet: 1 tab(s) orally once a day  QUEtiapine 25 mg oral tablet: 1 tab(s) orally once a day (at bedtime)  traZODone 50 mg oral tablet: 1 tab(s) orally once a day (at bedtime)  Xopenex HFA CFC free 45 mcg/inh inhalation aerosol: 1 puff(s) inhaled , As Needed   acetaminophen 325 mg oral tablet: 2 tab(s) orally every 6 hours As needed Mild Pain (1 - 3)  acetaminophen 325 mg oral tablet: 2 tab(s) orally as needed for pain  Albuterol (Eqv-ProAir HFA) 90 mcg/inh inhalation aerosol: 2 puff(s) inhaled every 6 hours as needed for  shortness of breath and/or wheezing  aspirin 325 mg oral tablet: 1 tab(s) orally once a day  aspirin 325 mg oral tablet: 1 tab(s) orally once a day  budesonide 0.5 mg/2 mL inhalation suspension: 2 milliliter(s) by nebulizer 2 times a day as needed  famotidine 20 mg oral tablet: 1 tab(s) orally once a day  famotidine 20 mg oral tablet: 1 tab(s) orally once a day  folic acid 1 mg oral tablet: 1 tab(s) orally once a day  formoterol 20 mcg/2 mL inhalation solution: 2 milliliter(s) inhaled every 12 hours  furosemide 40 mg oral tablet: 1 tab(s) orally once a day  furosemide 40 mg oral tablet: 1 tab(s) orally once a day  ipratropium-albuterol 0.5 mg-2.5 mg/3 mL inhalation solution: 3 milliliter(s) by nebulizer 4 times a day as needed  levothyroxine 100 mcg (0.1 mg) oral tablet: 1 tab(s) orally once a day  meloxicam 15 mg oral tablet: 1 tab(s) orally once a day  Multiple Vitamins oral tablet: 1 tab(s) orally once a day  Multiple Vitamins with Minerals oral tablet: 1 tab(s) orally once a day  QUEtiapine 25 mg oral tablet: 1 tab(s) orally once a day  QUEtiapine 25 mg oral tablet: 1 tab(s) orally once a day (at bedtime)  traZODone 50 mg oral tablet: 1 tab(s) orally once a day (at bedtime)  traZODone 50 mg oral tablet: 1 tab(s) orally once a day (at bedtime)  Xopenex HFA CFC free 45 mcg/inh inhalation aerosol: 1 puff(s) inhaled , As Needed   acetaminophen 325 mg oral tablet: 2 tab(s) orally every 6 hours As needed Mild Pain (1 - 3)  Albuterol (Eqv-ProAir HFA) 90 mcg/inh inhalation aerosol: 2 puff(s) inhaled every 6 hours as needed for  shortness of breath and/or wheezing  aspirin 325 mg oral tablet: 1 tab(s) orally once a day  budesonide 0.5 mg/2 mL inhalation suspension: 2 milliliter(s) by nebulizer 2 times a day as needed  famotidine 20 mg oral tablet: 1 tab(s) orally once a day  folic acid 1 mg oral tablet: 1 tab(s) orally once a day  formoterol 20 mcg/2 mL inhalation solution: 2 milliliter(s) inhaled every 12 hours  furosemide 40 mg oral tablet: 1 tab(s) orally once a day  guaiFENesin 100 mg/5 mL oral liquid: 5 milliliter(s) orally every 6 hours As needed Cough  ipratropium-albuterol 0.5 mg-2.5 mg/3 mL inhalation solution: 3 milliliter(s) by nebulizer 4 times a day as needed  levothyroxine 100 mcg (0.1 mg) oral tablet: 1 tab(s) orally once a day  meloxicam 15 mg oral tablet: 1 tab(s) orally once a day  Multiple Vitamins with Minerals oral tablet: 1 tab(s) orally once a day  QUEtiapine 25 mg oral tablet: 1 tab(s) orally once a day  traZODone 50 mg oral tablet: 1 tab(s) orally once a day (at bedtime)  Xopenex HFA CFC free 45 mcg/inh inhalation aerosol: 1 puff(s) inhaled , As Needed

## 2024-04-23 NOTE — PROGRESS NOTE ADULT - SUBJECTIVE AND OBJECTIVE BOX
DATE OF SERVICE: 04-23-24 @ 10:04    Patient is a 83y old  Male who presents with a chief complaint of Chart Reviewed, Events Noted  "83-year-old male history of alcohol abuse asthma hyperlipidemia hypertension hypothyroid, CLL currently not being on chemo or radiation, coming in with increased creatinine and BUN in the outpatient setting."      (19 Apr 2024 10:36)      SUBJECTIVE / OVERNIGHT EVENTS:  No chest pain. No shortness of breath. No complaints. No events overnight.     MEDICATIONS  (STANDING):  albuterol/ipratropium for Nebulization 3 milliLiter(s) Nebulizer every 6 hours  aspirin 325 milliGRAM(s) Oral daily  buDESOnide    Inhalation Suspension 0.5 milliGRAM(s) Inhalation two times a day  chlorhexidine 2% Cloths 1 Application(s) Topical daily  famotidine    Tablet 20 milliGRAM(s) Oral daily  folic acid 1 milliGRAM(s) Oral daily  furosemide   Injectable 40 milliGRAM(s) IV Push daily  heparin   Injectable 5000 Unit(s) SubCutaneous every 8 hours  levothyroxine Injectable 50 MICROGram(s) IV Push <User Schedule>  multivitamin/minerals 1 Tablet(s) Oral daily  piperacillin/tazobactam IVPB.. 3.375 Gram(s) IV Intermittent every 8 hours  QUEtiapine 25 milliGRAM(s) Oral daily  traZODone 50 milliGRAM(s) Oral at bedtime    MEDICATIONS  (PRN):  acetaminophen     Tablet .. 650 milliGRAM(s) Oral every 6 hours PRN Mild Pain (1 - 3)  guaiFENesin Oral Liquid (Sugar-Free) 100 milliGRAM(s) Oral every 6 hours PRN Cough      Vital Signs Last 24 Hrs  T(C): 36.3 (23 Apr 2024 08:06), Max: 36.6 (23 Apr 2024 00:28)  T(F): 97.4 (23 Apr 2024 08:06), Max: 97.8 (23 Apr 2024 00:28)  HR: 79 (23 Apr 2024 08:06) (72 - 80)  BP: 121/73 (23 Apr 2024 08:06) (115/65 - 128/61)  BP(mean): --  RR: 18 (23 Apr 2024 08:06) (17 - 18)  SpO2: 97% (23 Apr 2024 08:06) (95% - 97%)    Parameters below as of 23 Apr 2024 08:06  Patient On (Oxygen Delivery Method): nasal cannula  O2 Flow (L/min): 2    CAPILLARY BLOOD GLUCOSE        I&O's Summary    22 Apr 2024 07:01  -  23 Apr 2024 07:00  --------------------------------------------------------  IN: 220 mL / OUT: 0 mL / NET: 220 mL        PHYSICAL EXAM:  GENERAL: NAD, well-developed  HEAD:  Atraumatic, Normocephalic  EYES: EOMI, PERRLA, conjunctiva and sclera clear  NECK: Supple, No JVD  CHEST/LUNG: Clear to auscultation bilaterally; No wheeze  HEART: Regular rate and rhythm; No murmurs, rubs, or gallops  ABDOMEN: Soft, Nontender, Nondistended; Bowel sounds present  EXTREMITIES:  2+ Peripheral Pulses, No clubbing, cyanosis, or edema  PSYCH: AAOx3  NEUROLOGY: non-focal  SKIN: No rashes or lesions    LABS:                        8.5    73.40 )-----------( 181      ( 23 Apr 2024 06:58 )             27.2     04-23    142  |  102  |  28<H>  ----------------------------<  90  4.0   |  29  |  1.58<H>    Ca    10.6<H>      23 Apr 2024 06:58  Mg     2.3     04-23            Urinalysis Basic - ( 23 Apr 2024 06:58 )    Color: x / Appearance: x / SG: x / pH: x  Gluc: 90 mg/dL / Ketone: x  / Bili: x / Urobili: x   Blood: x / Protein: x / Nitrite: x   Leuk Esterase: x / RBC: x / WBC x   Sq Epi: x / Non Sq Epi: x / Bacteria: x        RADIOLOGY & ADDITIONAL TESTS:    Imaging Personally Reviewed:    Consultant(s) Notes Reviewed:      Care Discussed with Consultants/Other Providers:

## 2024-04-23 NOTE — DISCHARGE NOTE PROVIDER - NSDCCPCAREPLAN_GEN_ALL_CORE_FT
PRINCIPAL DISCHARGE DIAGNOSIS  Diagnosis: MARY (acute kidney injury)  Assessment and Plan of Treatment: Avoid taking NSAIDs (ex: Ibuprofen, Advil, Celebrex, Naprosyn) and other agents that can harm the kidneys such as intravenous contrast for diagnostic testing, combination cold medications, etc. until you are instructed to do so by your Primary Care Physician.  Have all of your medications adjusted for your renal function by your Health Care Provider.  Blood pressure control is important.  Take all medication as prescribed.  Do not overconsume foods that are high in potassium, such as bananas, until you are instructed to do so by your primary care physician.      SECONDARY DISCHARGE DIAGNOSES  Diagnosis: Hypothyroid  Assessment and Plan of Treatment: Continue synthroid as prescribed   Follow up with endocrinology for further outpatient management, including repeat labs    Diagnosis: Acute UTI  Assessment and Plan of Treatment: You had a bladder and/or kidney infection.  If you are discharged on antibiotics, you will need to finish the medication as prescribed to reduced the likelihood that the infection will recur.  Avoid medications that will cause urinary retention such as benadryl whenever possible.  Drink adequate fluids - there is no harm in drinking cranberry juice.  Contact your doctor if you experience new symptoms or continued symptoms after treatment, such as pain or burning with urination, frequent urination, urinary urgency, blood in the urine, fever, back pain, and/or nausea vomiting.

## 2024-04-23 NOTE — DISCHARGE NOTE PROVIDER - PROVIDER TOKENS
PROVIDER:[TOKEN:[[id number]],FOLLOWUP:[1 week]],PROVIDER:[TOKEN:[[id number]]],PROVIDER:[TOKEN:[[id number]]]

## 2024-04-23 NOTE — PROGRESS NOTE ADULT - SUBJECTIVE AND OBJECTIVE BOX
Chief complaint  Patient is a 83y old  Male who presents with a chief complaint of creatinine increase (23 Apr 2024 11:36)         Labs and Fingersticks  CAPILLARY BLOOD GLUCOSE          Anion Gap: 11 (04-23 @ 06:58)  Anion Gap: 12 (04-22 @ 07:01)      Calcium: 10.6 *H* (04-23 @ 06:58)  Calcium: 10.9 *H* (04-22 @ 07:01)  Vitamin D, 25-Hydroxy: 29.4 *L* (04-23 @ 06:58)          04-23    142  |  102  |  28<H>  ----------------------------<  90  4.0   |  29  |  1.58<H>    Ca    10.6<H>      23 Apr 2024 06:58  Mg     2.3     04-23                          8.5    73.40 )-----------( 181      ( 23 Apr 2024 06:58 )             27.2     Medications  MEDICATIONS  (STANDING):  acetaminophen   IVPB .. 1000 milliGRAM(s) IV Intermittent once  albuterol/ipratropium for Nebulization 3 milliLiter(s) Nebulizer every 6 hours  aspirin 325 milliGRAM(s) Oral daily  buDESOnide    Inhalation Suspension 0.5 milliGRAM(s) Inhalation two times a day  chlorhexidine 2% Cloths 1 Application(s) Topical daily  famotidine    Tablet 20 milliGRAM(s) Oral daily  folic acid 1 milliGRAM(s) Oral daily  furosemide   Injectable 40 milliGRAM(s) IV Push daily  heparin   Injectable 5000 Unit(s) SubCutaneous every 8 hours  levothyroxine Injectable 50 MICROGram(s) IV Push <User Schedule>  multivitamin/minerals 1 Tablet(s) Oral daily  piperacillin/tazobactam IVPB.. 3.375 Gram(s) IV Intermittent every 8 hours  QUEtiapine 25 milliGRAM(s) Oral daily  traZODone 50 milliGRAM(s) Oral at bedtime      Physical Exam  General: Patient comfortable in bed   Vital Signs Last 12 Hrs  T(F): 98 (04-23-24 @ 15:51), Max: 98 (04-23-24 @ 15:51)  HR: 79 (04-23-24 @ 15:51) (72 - 79)  BP: 120/50 (04-23-24 @ 15:51) (120/50 - 122/70)  BP(mean): --  RR: 18 (04-23-24 @ 15:51) (18 - 18)  SpO2: 93% (04-23-24 @ 15:51) (93% - 97%)    CVS: S1S2   Respiratory: No wheezing, no crepitations  GI: Abdomen soft, bowel sounds positive  Musculoskeletal:  moves all extremities

## 2024-04-23 NOTE — PROGRESS NOTE ADULT - ASSESSMENT
83-year-old male history of alcohol abuse asthma hyperlipidemia hypertension hypothyroid, CLL , pw worsening  renal function  Assessment  Hypothyroidism: On Synthroid 25 mcg po daily prior, ?compliance, was stopped outpatient as per family, unsure why, TSH elevated 78, rpt TFTs reveal severe hypothyroid, TPO AB pending , TSH 90 with low FT4 0.3 , was started on IV synthroid.  Plan is to discharge on 75 mcg synthroid PO  HTN: on antihypertensive medications, monitored, asymptomatic.  CKD: Monitor labs/BMP, renal follow up       Discussed plan and management wit Dr marlo Simmons MD

## 2024-04-24 NOTE — PROGRESS NOTE ADULT - ASSESSMENT
83-year-old male history of alcohol abuse asthma hyperlipidemia hypertension hypothyroid, CLL , pw worsening  renal function  Assessment  Hypothyroidism: On Synthroid 25 mcg po daily prior, ?compliance, was stopped outpatient as per family, unsure why, TSH elevated 78, rpt TFTs reveal severe hypothyroid, TPO AB pending , TSH 90 with low FT4 0.3 , was started on IV synthroid. rpt Free thyroxine improved 0.5.   Plan is to discharge on 100 mcg synthroid PO  HTN: on antihypertensive medications, monitored, asymptomatic.  CKD: Monitor labs/BMP, renal follow up       Discussed plan and management wit Dr marlo Simmons MD

## 2024-04-24 NOTE — PROGRESS NOTE ADULT - SUBJECTIVE AND OBJECTIVE BOX
DATE OF SERVICE: 04-24-24 @ 10:17    Patient is a 83y old  Male who presents with a chief complaint of creatinine increase (23 Apr 2024 11:36)      SUBJECTIVE / OVERNIGHT EVENTS:  No chest pain. No shortness of breath. No complaints. No events overnight.     MEDICATIONS  (STANDING):  acetaminophen   IVPB .. 1000 milliGRAM(s) IV Intermittent once  albuterol/ipratropium for Nebulization 3 milliLiter(s) Nebulizer every 6 hours  aspirin 325 milliGRAM(s) Oral daily  buDESOnide    Inhalation Suspension 0.5 milliGRAM(s) Inhalation two times a day  chlorhexidine 2% Cloths 1 Application(s) Topical daily  famotidine    Tablet 20 milliGRAM(s) Oral daily  folic acid 1 milliGRAM(s) Oral daily  furosemide   Injectable 40 milliGRAM(s) IV Push daily  heparin   Injectable 5000 Unit(s) SubCutaneous every 8 hours  levothyroxine Injectable 50 MICROGram(s) IV Push <User Schedule>  multivitamin/minerals 1 Tablet(s) Oral daily  piperacillin/tazobactam IVPB.. 3.375 Gram(s) IV Intermittent every 8 hours  QUEtiapine 25 milliGRAM(s) Oral daily  traZODone 50 milliGRAM(s) Oral at bedtime    MEDICATIONS  (PRN):  acetaminophen     Tablet .. 650 milliGRAM(s) Oral every 6 hours PRN Mild Pain (1 - 3)  guaiFENesin Oral Liquid (Sugar-Free) 100 milliGRAM(s) Oral every 6 hours PRN Cough      Vital Signs Last 24 Hrs  T(C): 36.5 (24 Apr 2024 01:09), Max: 36.7 (23 Apr 2024 15:51)  T(F): 97.7 (24 Apr 2024 01:09), Max: 98 (23 Apr 2024 15:51)  HR: 84 (24 Apr 2024 04:47) (79 - 84)  BP: 124/56 (24 Apr 2024 04:47) (115/81 - 124/56)  BP(mean): --  RR: 18 (24 Apr 2024 04:47) (16 - 18)  SpO2: 98% (24 Apr 2024 04:47) (93% - 99%)    Parameters below as of 24 Apr 2024 04:47  Patient On (Oxygen Delivery Method): room air      CAPILLARY BLOOD GLUCOSE        I&O's Summary      PHYSICAL EXAM:  GENERAL: NAD, well-developed  HEAD:  Atraumatic, Normocephalic  EYES: EOMI, PERRLA, conjunctiva and sclera clear  NECK: Supple, No JVD  CHEST/LUNG: Clear to auscultation bilaterally; No wheeze  HEART: Regular rate and rhythm; No murmurs, rubs, or gallops  ABDOMEN: Soft, Nontender, Nondistended; Bowel sounds present  EXTREMITIES: left leg swelling and erythema  PSYCH: AAOx3  NEUROLOGY: non-focal  SKIN: No rashes or lesions    LABS:                        8.5    73.40 )-----------( 181      ( 23 Apr 2024 06:58 )             27.2     04-23    142  |  102  |  28<H>  ----------------------------<  90  4.0   |  29  |  1.58<H>    Ca    10.6<H>      23 Apr 2024 06:58  Mg     2.3     04-23            Urinalysis Basic - ( 23 Apr 2024 06:58 )    Color: x / Appearance: x / SG: x / pH: x  Gluc: 90 mg/dL / Ketone: x  / Bili: x / Urobili: x   Blood: x / Protein: x / Nitrite: x   Leuk Esterase: x / RBC: x / WBC x   Sq Epi: x / Non Sq Epi: x / Bacteria: x    < from: [facility] Duplex Lower Ext Vein Scan, Bilat (04.18.24 @ 22:20) >  FINDINGS:    RIGHT:  Normal compressibility of the RIGHT common femoral, femoral and popliteal   veins.  Doppler examination shows normal spontaneous and phasic flow.  No RIGHT calf vein thrombosis is detected.    LEFT:  Normal compressibility of the LEFT common femoral, femoral and popliteal   veins.  Doppler examination shows normal spontaneous and phasic flow.  No LEFT calf vein thrombosis is detected.    IMPRESSION:  No evidence of deep venous thrombosis in either lower extremity.      < end of copied text >      RADIOLOGY & ADDITIONAL TESTS:    Imaging Personally Reviewed:    Consultant(s) Notes Reviewed:      Care Discussed with Consultants/Other Providers:

## 2024-04-24 NOTE — PROGRESS NOTE ADULT - SUBJECTIVE AND OBJECTIVE BOX
Chief complaint  Patient is a 83y old  Male who presents with a chief complaint of creatinine increase (23 Apr 2024 11:36)         Labs and Fingersticks  CAPILLARY BLOOD GLUCOSE          Anion Gap: 11 (04-23 @ 06:58)      Calcium: 10.6 *H* (04-23 @ 06:58)  Vitamin D, 25-Hydroxy: 29.4 *L* (04-23 @ 06:58)  Vitamin D, 1, 25-Dihydroxy: 62.9 (04-23 @ 06:58)          04-23    142  |  102  |  28<H>  ----------------------------<  90  4.0   |  29  |  1.58<H>    Ca    10.6<H>      23 Apr 2024 06:58  Mg     2.3     04-23                          8.5    73.40 )-----------( 181      ( 23 Apr 2024 06:58 )             27.2     Medications  MEDICATIONS  (STANDING):  acetaminophen   IVPB .. 1000 milliGRAM(s) IV Intermittent once  albuterol/ipratropium for Nebulization 3 milliLiter(s) Nebulizer every 6 hours  aspirin 325 milliGRAM(s) Oral daily  buDESOnide    Inhalation Suspension 0.5 milliGRAM(s) Inhalation two times a day  chlorhexidine 2% Cloths 1 Application(s) Topical daily  famotidine    Tablet 20 milliGRAM(s) Oral daily  folic acid 1 milliGRAM(s) Oral daily  furosemide   Injectable 40 milliGRAM(s) IV Push daily  heparin   Injectable 5000 Unit(s) SubCutaneous every 8 hours  levothyroxine Injectable 50 MICROGram(s) IV Push <User Schedule>  multivitamin/minerals 1 Tablet(s) Oral daily  piperacillin/tazobactam IVPB.. 3.375 Gram(s) IV Intermittent every 8 hours  QUEtiapine 25 milliGRAM(s) Oral daily  traZODone 50 milliGRAM(s) Oral at bedtime      Physical Exam  General: Patient comfortable in bed   Vital Signs Last 12 Hrs  T(F): --  HR: 82 (04-24-24 @ 10:12) (82 - 84)  BP: 132/57 (04-24-24 @ 10:12) (124/56 - 132/57)  BP(mean): --  RR: 18 (04-24-24 @ 10:12) (18 - 18)  SpO2: 97% (04-24-24 @ 10:12) (97% - 98%)    CVS: S1S2   Respiratory: No wheezing, no crepitations  GI: Abdomen soft, bowel sounds positive  Musculoskeletal:  moves all extremities

## 2024-04-25 NOTE — PROGRESS NOTE ADULT - ASSESSMENT
83-year-old male history of alcohol abuse asthma hyperlipidemia hypertension hypothyroid, CLL currently not being on chemo or radiation, coming in with increased creatinine and BUN in the outpatient setting.  With no increased white count.  White count on 8/9 was 45, 9/25 it was 33, today it is 68.  Hemoglobin steady at 8.7.  Platelet count 180 today, last on 9/25 was 133.   BUN and creatinine on 8/9/23 was 0.91 and 15.  4- the creatinine is 1.8 with a BUN of 52.  Patient without fevers.  General chronic cough and SOB.  No chills. + fatigue. No belly pain.  Normal bowel movements.  Has been hydrating but was also placed on 2 diuretics and was told to stop them this week. + swollen bl LE     hypothyroid  - abnormal repeat tsh and free t4  - cont  synthroid  - endo consult following    positive urine culture  - 50,000 - 99,000 CFU/mL Pseudomonas aeruginosa  - zosyn x 3 days    MARY  - monitor cre  - avoid nephrotoxins  - creatinine is stable    r/o pulm edema  - cardiomegaly  - TTE done    COPD  - c/w duoneb  - inhaled steroids    lukas edema  - doppler neg for dvt    dvt px  - sq heparin    CLL  - hematology consult    d/c planning to [facility]    dispo: change lasix to PO , plan for d/c to [facility] . d/w patient and daughter bedside

## 2024-04-25 NOTE — PROGRESS NOTE ADULT - SUBJECTIVE AND OBJECTIVE BOX
Patient is a 83y old  Male who presents with a chief complaint of creatinine increase (23 Apr 2024 11:36)      INTERVAL HPI/OVERNIGHT EVENTS:  T(C): 36.6 (04-25-24 @ 16:17), Max: 37.1 (04-25-24 @ 00:00)  HR: 64 (04-25-24 @ 16:17) (64 - 90)  BP: 106/66 (04-25-24 @ 16:17) (106/66 - 135/67)  RR: 18 (04-25-24 @ 16:17) (18 - 18)  SpO2: 98% (04-25-24 @ 16:17) (94% - 100%)  Wt(kg): --  I&O's Summary    24 Apr 2024 07:01  -  25 Apr 2024 07:00  --------------------------------------------------------  IN: 120 mL / OUT: 300 mL / NET: -180 mL    25 Apr 2024 07:01  -  25 Apr 2024 20:12  --------------------------------------------------------  IN: 100 mL / OUT: 0 mL / NET: 100 mL        PAST MEDICAL & SURGICAL HISTORY:  Asthma      Hypothyroid      Alcohol abuse      Hypertension      Hyperlipidemia          SOCIAL HISTORY  Alcohol:  Tobacco:  Illicit substance use:    FAMILY HISTORY:    REVIEW OF SYSTEMS:  CONSTITUTIONAL: No fever, weight loss, or fatigue  EYES: No eye pain, visual disturbances, or discharge  ENMT:  No difficulty hearing, tinnitus, vertigo; No sinus or throat pain  NECK: No pain or stiffness  RESPIRATORY: No cough, wheezing, chills or hemoptysis; No shortness of breath  CARDIOVASCULAR: No chest pain, palpitations, dizziness, or leg swelling  GASTROINTESTINAL: No abdominal or epigastric pain. No nausea, vomiting, or hematemesis; No diarrhea or constipation. No melena or hematochezia.  GENITOURINARY: No dysuria, frequency, hematuria, or incontinence  NEUROLOGICAL: No headaches, memory loss, loss of strength, numbness, or tremors  SKIN: No itching, burning, rashes, or lesions   LYMPH NODES: No enlarged glands  ENDOCRINE: No heat or cold intolerance; No hair loss  MUSCULOSKELETAL: No joint pain or swelling; No muscle, back, or extremity pain  PSYCHIATRIC: No depression, anxiety, mood swings, or difficulty sleeping  HEME/LYMPH: No easy bruising, or bleeding gums  ALLERY AND IMMUNOLOGIC: No hives or eczema    RADIOLOGY & ADDITIONAL TESTS:    Imaging Personally Reviewed:  [ ] YES  [ ] NO    Consultant(s) Notes Reviewed:  [ ] YES  [ ] NO    PHYSICAL EXAM:  GENERAL: NAD, well-groomed, well-developed  HEAD:  Atraumatic, Normocephalic  EYES: EOMI, PERRLA, conjunctiva and sclera clear  ENMT: No tonsillar erythema, exudates, or enlargement; Moist mucous membranes, Good dentition, No lesions  NECK: Supple, No JVD, Normal thyroid  NERVOUS SYSTEM:  Alert & Oriented X3, Good concentration; Motor Strength 5/5 B/L upper and lower extremities; DTRs 2+ intact and symmetric  CHEST/LUNG: Clear to percussion bilaterally; No rales, rhonchi, wheezing, or rubs  HEART: Regular rate and rhythm; No murmurs, rubs, or gallops  ABDOMEN: Soft, Nontender, Nondistended; Bowel sounds present  EXTREMITIES:  2+ Peripheral Pulses, No clubbing, cyanosis, or edema  LYMPH: No lymphadenopathy noted  SKIN: No rashes or lesions    LABS:                        8.9    82.54 )-----------( 163      ( 25 Apr 2024 08:45 )             29.6     04-25    139  |  98  |  20  ----------------------------<  97  3.7   |  31  |  1.56<H>    Ca    10.8<H>      25 Apr 2024 08:45        Urinalysis Basic - ( 25 Apr 2024 08:45 )    Color: x / Appearance: x / SG: x / pH: x  Gluc: 97 mg/dL / Ketone: x  / Bili: x / Urobili: x   Blood: x / Protein: x / Nitrite: x   Leuk Esterase: x / RBC: x / WBC x   Sq Epi: x / Non Sq Epi: x / Bacteria: x      CAPILLARY BLOOD GLUCOSE            Urinalysis Basic - ( 25 Apr 2024 08:45 )    Color: x / Appearance: x / SG: x / pH: x  Gluc: 97 mg/dL / Ketone: x  / Bili: x / Urobili: x   Blood: x / Protein: x / Nitrite: x   Leuk Esterase: x / RBC: x / WBC x   Sq Epi: x / Non Sq Epi: x / Bacteria: x        MEDICATIONS  (STANDING):  acetaminophen   IVPB .. 1000 milliGRAM(s) IV Intermittent once  albuterol/ipratropium for Nebulization 3 milliLiter(s) Nebulizer every 6 hours  aspirin 325 milliGRAM(s) Oral daily  buDESOnide    Inhalation Suspension 0.5 milliGRAM(s) Inhalation two times a day  chlorhexidine 2% Cloths 1 Application(s) Topical daily  famotidine    Tablet 20 milliGRAM(s) Oral daily  folic acid 1 milliGRAM(s) Oral daily  heparin   Injectable 5000 Unit(s) SubCutaneous every 8 hours  levothyroxine 100 MICROGram(s) Oral daily  multivitamin/minerals 1 Tablet(s) Oral daily  QUEtiapine 25 milliGRAM(s) Oral daily  traZODone 50 milliGRAM(s) Oral at bedtime    MEDICATIONS  (PRN):  acetaminophen     Tablet .. 650 milliGRAM(s) Oral every 6 hours PRN Mild Pain (1 - 3)  guaiFENesin Oral Liquid (Sugar-Free) 100 milliGRAM(s) Oral every 6 hours PRN Cough      Care Discussed with Consultants/Other Providers [ ] YES  [ ] NO Patient is a 83y old  Male who presents with a chief complaint of creatinine increase (23 Apr 2024 11:36)      INTERVAL HPI/OVERNIGHT EVENTS: seen and examined, doing fair , daughter bedside  T(C): 36.6 (04-25-24 @ 16:17), Max: 37.1 (04-25-24 @ 00:00)  HR: 64 (04-25-24 @ 16:17) (64 - 90)  BP: 106/66 (04-25-24 @ 16:17) (106/66 - 135/67)  RR: 18 (04-25-24 @ 16:17) (18 - 18)  SpO2: 98% (04-25-24 @ 16:17) (94% - 100%)  Wt(kg): --  I&O's Summary    24 Apr 2024 07:01  -  25 Apr 2024 07:00  --------------------------------------------------------  IN: 120 mL / OUT: 300 mL / NET: -180 mL    25 Apr 2024 07:01  -  25 Apr 2024 20:12  --------------------------------------------------------  IN: 100 mL / OUT: 0 mL / NET: 100 mL        PAST MEDICAL & SURGICAL HISTORY:  Asthma      Hypothyroid      Alcohol abuse      Hypertension      Hyperlipidemia          SOCIAL HISTORY  Alcohol:  Tobacco:  Illicit substance use:    FAMILY HISTORY:    REVIEW OF SYSTEMS:  CONSTITUTIONAL: No fever, weight loss, or fatigue  EYES: No eye pain, visual disturbances, or discharge  ENMT:  No difficulty hearing, tinnitus, vertigo; No sinus or throat pain  NECK: No pain or stiffness  RESPIRATORY: No cough, wheezing, chills or hemoptysis; No shortness of breath  CARDIOVASCULAR: No chest pain, palpitations, dizziness, or leg swelling  GASTROINTESTINAL: No abdominal or epigastric pain. No nausea, vomiting, or hematemesis; No diarrhea or constipation. No melena or hematochezia.  GENITOURINARY: No dysuria, frequency, hematuria, or incontinence  NEUROLOGICAL: No headaches, memory loss, loss of strength, numbness, or tremors  SKIN: No itching, burning, rashes, or lesions   LYMPH NODES: No enlarged glands  ENDOCRINE: No heat or cold intolerance; No hair loss  MUSCULOSKELETAL: No joint pain or swelling; No muscle, back, or extremity pain  PSYCHIATRIC: No depression, anxiety, mood swings, or difficulty sleeping  HEME/LYMPH: No easy bruising, or bleeding gums  ALLERY AND IMMUNOLOGIC: No hives or eczema    RADIOLOGY & ADDITIONAL TESTS:    Imaging Personally Reviewed:  [ ] YES  [ ] NO    Consultant(s) Notes Reviewed:  [ ] YES  [ ] NO    PHYSICAL EXAM:  GENERAL: NAD, well-groomed, well-developed  HEAD:  Atraumatic, Normocephalic  EYES: EOMI, PERRLA, conjunctiva and sclera clear  ENMT: No tonsillar erythema, exudates, or enlargement; Moist mucous membranes, Good dentition, No lesions  NECK: Supple, No JVD, Normal thyroid  NERVOUS SYSTEM:  Alert & Oriented X3, Good concentration; Motor Strength 5/5 B/L upper and lower extremities; DTRs 2+ intact and symmetric  CHEST/LUNG: Clear to percussion bilaterally; No rales, rhonchi, wheezing, or rubs  HEART: Regular rate and rhythm; No murmurs, rubs, or gallops  ABDOMEN: Soft, Nontender, Nondistended; Bowel sounds present  EXTREMITIES:  2+ Peripheral Pulses, No clubbing, cyanosis, or edema  LYMPH: No lymphadenopathy noted  SKIN: No rashes or lesions    LABS:                        8.9    82.54 )-----------( 163      ( 25 Apr 2024 08:45 )             29.6     04-25    139  |  98  |  20  ----------------------------<  97  3.7   |  31  |  1.56<H>    Ca    10.8<H>      25 Apr 2024 08:45        Urinalysis Basic - ( 25 Apr 2024 08:45 )    Color: x / Appearance: x / SG: x / pH: x  Gluc: 97 mg/dL / Ketone: x  / Bili: x / Urobili: x   Blood: x / Protein: x / Nitrite: x   Leuk Esterase: x / RBC: x / WBC x   Sq Epi: x / Non Sq Epi: x / Bacteria: x      CAPILLARY BLOOD GLUCOSE            Urinalysis Basic - ( 25 Apr 2024 08:45 )    Color: x / Appearance: x / SG: x / pH: x  Gluc: 97 mg/dL / Ketone: x  / Bili: x / Urobili: x   Blood: x / Protein: x / Nitrite: x   Leuk Esterase: x / RBC: x / WBC x   Sq Epi: x / Non Sq Epi: x / Bacteria: x        MEDICATIONS  (STANDING):  acetaminophen   IVPB .. 1000 milliGRAM(s) IV Intermittent once  albuterol/ipratropium for Nebulization 3 milliLiter(s) Nebulizer every 6 hours  aspirin 325 milliGRAM(s) Oral daily  buDESOnide    Inhalation Suspension 0.5 milliGRAM(s) Inhalation two times a day  chlorhexidine 2% Cloths 1 Application(s) Topical daily  famotidine    Tablet 20 milliGRAM(s) Oral daily  folic acid 1 milliGRAM(s) Oral daily  heparin   Injectable 5000 Unit(s) SubCutaneous every 8 hours  levothyroxine 100 MICROGram(s) Oral daily  multivitamin/minerals 1 Tablet(s) Oral daily  QUEtiapine 25 milliGRAM(s) Oral daily  traZODone 50 milliGRAM(s) Oral at bedtime    MEDICATIONS  (PRN):  acetaminophen     Tablet .. 650 milliGRAM(s) Oral every 6 hours PRN Mild Pain (1 - 3)  guaiFENesin Oral Liquid (Sugar-Free) 100 milliGRAM(s) Oral every 6 hours PRN Cough      Care Discussed with Consultants/Other Providers [ ] YES  [ ] NO

## 2024-04-25 NOTE — PROGRESS NOTE ADULT - SUBJECTIVE AND OBJECTIVE BOX
Chief Complaint:  FU CLL    History of Present Illness:  seated in chair, winded after being cleaned; worked with PT; now on oxygen; no f/c, + cough, no chest pain, no n/v, no abd pain, decreased appetite, no overt bleeding, wife at bedside      MEDICATIONS  (STANDING):  acetaminophen   IVPB .. 1000 milliGRAM(s) IV Intermittent once  albuterol/ipratropium for Nebulization 3 milliLiter(s) Nebulizer every 6 hours  aspirin 325 milliGRAM(s) Oral daily  buDESOnide    Inhalation Suspension 0.5 milliGRAM(s) Inhalation two times a day  chlorhexidine 2% Cloths 1 Application(s) Topical daily  famotidine    Tablet 20 milliGRAM(s) Oral daily  folic acid 1 milliGRAM(s) Oral daily  furosemide   Injectable 40 milliGRAM(s) IV Push daily  heparin   Injectable 5000 Unit(s) SubCutaneous every 8 hours  levothyroxine 100 MICROGram(s) Oral daily  multivitamin/minerals 1 Tablet(s) Oral daily  QUEtiapine 25 milliGRAM(s) Oral daily  traZODone 50 milliGRAM(s) Oral at bedtime    MEDICATIONS  (PRN):  acetaminophen     Tablet .. 650 milliGRAM(s) Oral every 6 hours PRN Mild Pain (1 - 3)  guaiFENesin Oral Liquid (Sugar-Free) 100 milliGRAM(s) Oral every 6 hours PRN Cough      Allergies    No Known Allergies    Intolerances        Vital Signs Last 24 Hrs  T(C): 36.6 (25 Apr 2024 16:17), Max: 37.1 (25 Apr 2024 00:00)  T(F): 97.8 (25 Apr 2024 16:17), Max: 98.8 (25 Apr 2024 00:00)  HR: 64 (25 Apr 2024 16:17) (64 - 90)  BP: 106/66 (25 Apr 2024 16:17) (106/66 - 135/67)  BP(mean): --  RR: 18 (25 Apr 2024 16:17) (18 - 18)  SpO2: 98% (25 Apr 2024 16:17) (94% - 100%)    Parameters below as of 25 Apr 2024 16:17  Patient On (Oxygen Delivery Method): room air        PHYSICAL EXAM  General: adult in NAD  HEENT: clear oropharynx, anicteric sclera, pink conjunctiva  Neck: supple  CV: normal S1/S2   Lungs: decreased BS  Abdomen: soft non-tender non-distended, positive bowel sounds  Ext: no calf tenderness, + edema stable  Skin: no rashes and no petechiae  Lymph Nodes: No LAD in neck  Neuro: alert and oriented    LABS:                          8.9    82.54 )-----------( 163      ( 25 Apr 2024 08:45 )             29.6         Mean Cell Volume : 93.4 fl  Mean Cell Hemoglobin : 28.1 pg  Mean Cell Hemoglobin Concentration : 30.1 gm/dL  Auto Neutrophil # : x  Auto Lymphocyte # : x  Auto Monocyte # : x  Auto Eosinophil # : x  Auto Basophil # : x  Auto Neutrophil % : x  Auto Lymphocyte % : x  Auto Monocyte % : x  Auto Eosinophil % : x  Auto Basophil % : x      Serial CBC's  04-25 @ 08:45  Hct-29.6 / Hgb-8.9 / Plat-163 / RBC-3.17 / WBC-82.54  Serial CBC's  04-23 @ 06:58  Hct-27.2 / Hgb-8.5 / Plat-181 / RBC-2.72 / WBC-73.40  Serial CBC's  04-22 @ 06:56  Hct-26.1 / Hgb-8.5 / Plat-181 / RBC-2.61 / WBC-60.37      04-25    139  |  98  |  20  ----------------------------<  97  3.7   |  31  |  1.56<H>    Ca    10.8<H>      25 Apr 2024 08:45            Folate, Serum: 19.8 ng/mL (04-19 @ 06:59)  Vitamin B12, Serum: 1573 pg/mL (04-19 @ 06:59)        Radiology:

## 2024-04-25 NOTE — PROGRESS NOTE ADULT - ASSESSMENT
83M with CLL not on treatment, iron deficiency anemia, history of alcohol abuse, asthma, HLD, HTN, hypothyroid with cough, weakness, increased BUN/Cr    #CLL  - not on treatment, follows with Dr. Lainez as outpatient  - leukocytosis, lymphocyte predominant- higher than recent baseline but WBC has been at this level in past  - WBC likely higher with acute illness  - plt normal and no evidence of hemolysis  - no need for treatment  - WBC higher than prior- pt now requiring oxygen    #Anemia, normocytic  - lower than prior- likely in setting of acute illness, renal dysfunction, also with hypothyroid and very elevated TSH  - no overt bleeding, check stool occult  - iron studies 4/18 as outpt: ferritin 346, iron 21, iron sat 9; B12/folate elevated  - on oral iron at baseline  - bili is normal, LDH sl elevated, haptoglobin elevated- not c/w hemolysis  - Hg stable    #hypercalcemia likely in setting of volume depletion- improved from admission, PTH normal, PTHrp pending  - ca persistent elevated, check iCa, will send SPEP/IF/FLC    hypothyroid- endocrinology following- on synthroid

## 2024-04-25 NOTE — PROGRESS NOTE ADULT - SUBJECTIVE AND OBJECTIVE BOX
Chief complaint  Patient is a 83y old  Male who presents with a chief complaint of creatinine increase (23 Apr 2024 11:36)         Labs and Fingersticks  CAPILLARY BLOOD GLUCOSE          Anion Gap: 10 (04-25 @ 08:45)      Calcium: 10.8 *H* (04-25 @ 08:45)          04-25    139  |  98  |  20  ----------------------------<  97  3.7   |  31  |  1.56<H>    Ca    10.8<H>      25 Apr 2024 08:45                          8.9    82.54 )-----------( 163      ( 25 Apr 2024 08:45 )             29.6     Medications  MEDICATIONS  (STANDING):  acetaminophen   IVPB .. 1000 milliGRAM(s) IV Intermittent once  albuterol/ipratropium for Nebulization 3 milliLiter(s) Nebulizer every 6 hours  aspirin 325 milliGRAM(s) Oral daily  buDESOnide    Inhalation Suspension 0.5 milliGRAM(s) Inhalation two times a day  chlorhexidine 2% Cloths 1 Application(s) Topical daily  famotidine    Tablet 20 milliGRAM(s) Oral daily  folic acid 1 milliGRAM(s) Oral daily  furosemide   Injectable 40 milliGRAM(s) IV Push daily  heparin   Injectable 5000 Unit(s) SubCutaneous every 8 hours  levothyroxine 100 MICROGram(s) Oral daily  multivitamin/minerals 1 Tablet(s) Oral daily  QUEtiapine 25 milliGRAM(s) Oral daily  traZODone 50 milliGRAM(s) Oral at bedtime      Physical Exam  General: Patient comfortable in bed   Vital Signs Last 12 Hrs  T(F): 97.6 (04-25-24 @ 08:35), Max: 97.6 (04-25-24 @ 08:35)  HR: 84 (04-25-24 @ 08:35) (70 - 84)  BP: 130/75 (04-25-24 @ 08:35) (130/75 - 131/57)  BP(mean): --  RR: 18 (04-25-24 @ 08:35) (18 - 18)  SpO2: 100% (04-25-24 @ 08:35) (100% - 100%)    CVS: S1S2   Respiratory: No wheezing, no crepitations  GI: Abdomen soft, bowel sounds positive  Musculoskeletal:  moves all extremities

## 2024-04-26 NOTE — PROGRESS NOTE ADULT - SUBJECTIVE AND OBJECTIVE BOX
Chief Complaint:  FU CLL    History of Present Illness:   seated in chair, weak in general, wife at bedside, no f/c, no cp, dyspnea stable, cough stable, no bleeding reported        MEDICATIONS  (STANDING):  acetaminophen   IVPB .. 1000 milliGRAM(s) IV Intermittent once  albuterol/ipratropium for Nebulization 3 milliLiter(s) Nebulizer every 6 hours  aspirin 325 milliGRAM(s) Oral daily  buDESOnide    Inhalation Suspension 0.5 milliGRAM(s) Inhalation two times a day  chlorhexidine 2% Cloths 1 Application(s) Topical daily  famotidine    Tablet 20 milliGRAM(s) Oral daily  folic acid 1 milliGRAM(s) Oral daily  furosemide    Tablet 40 milliGRAM(s) Oral daily  heparin   Injectable 5000 Unit(s) SubCutaneous every 8 hours  levothyroxine 100 MICROGram(s) Oral daily  multivitamin/minerals 1 Tablet(s) Oral daily  QUEtiapine 25 milliGRAM(s) Oral daily  traZODone 50 milliGRAM(s) Oral at bedtime    MEDICATIONS  (PRN):  acetaminophen     Tablet .. 650 milliGRAM(s) Oral every 6 hours PRN Mild Pain (1 - 3)  guaiFENesin Oral Liquid (Sugar-Free) 100 milliGRAM(s) Oral every 6 hours PRN Cough      Allergies    No Known Allergies    Intolerances        Vital Signs Last 24 Hrs  T(C): 36.4 (26 Apr 2024 09:20), Max: 36.7 (26 Apr 2024 00:00)  T(F): 97.6 (26 Apr 2024 09:20), Max: 98.1 (26 Apr 2024 00:00)  HR: 76 (26 Apr 2024 09:20) (64 - 89)  BP: 110/56 (26 Apr 2024 09:20) (106/66 - 132/74)  BP(mean): --  RR: 18 (26 Apr 2024 09:20) (18 - 18)  SpO2: 95% (26 Apr 2024 09:20) (95% - 100%)    Parameters below as of 26 Apr 2024 09:20  Patient On (Oxygen Delivery Method): nasal cannula  O2 Flow (L/min): 2      PHYSICAL EXAM  General: adult in NAD  HEENT: clear oropharynx, anicteric sclera, pink conjunctiva  Neck: supple  CV: normal S1/S2   Lungs: decreased BS  Abdomen: soft non-tender non-distended, positive bowel sounds  Ext: no clubbing cyanosis or edema  Skin: no rashes and no petechiae  Lymph Nodes: No LAD in neck  Neuro: alert and oriented     LABS:                          8.6    77.03 )-----------( 158      ( 26 Apr 2024 09:17 )             29.3         Mean Cell Volume : 94.8 fl  Mean Cell Hemoglobin : 27.8 pg  Mean Cell Hemoglobin Concentration : 29.4 gm/dL  Auto Neutrophil # : 7.32 K/uL  Auto Lymphocyte # : 69.02 K/uL  Auto Monocyte # : 0.00 K/uL  Auto Eosinophil # : 0.69 K/uL  Auto Basophil # : 0.00 K/uL  Auto Neutrophil % : 9.5 %  Auto Lymphocyte % : 89.6 %  Auto Monocyte % : 0.0 %  Auto Eosinophil % : 0.9 %  Auto Basophil % : 0.0 %      Serial CBC's  04-26 @ 09:17  Hct-29.3 / Hgb-8.6 / Plat-158 / RBC-3.09 / WBC-77.03  Serial CBC's  04-25 @ 08:45  Hct-29.6 / Hgb-8.9 / Plat-163 / RBC-3.17 / WBC-82.54  Serial CBC's  04-23 @ 06:58  Hct-27.2 / Hgb-8.5 / Plat-181 / RBC-2.72 / WBC-73.40      04-26    137  |  97  |  22  ----------------------------<  107<H>  4.0   |  31  |  1.56<H>    Ca    10.9<H>      26 Apr 2024 09:17    TPro  5.0<L>  /  Alb  3.2<L>  /  TBili  0.4  /  DBili  x   /  AST  36  /  ALT  21  /  AlkPhos  236<H>  04-26              ANUPAMA Kappa: 4.70 mg/dL (04-26 @ 09:17)  ANUPAMA Lambda: 3.29 mg/dL (04-26 @ 09:17)          Radiology:

## 2024-04-26 NOTE — PROVIDER CONTACT NOTE (CRITICAL VALUE NOTIFICATION) - ACTION/TREATMENT ORDERED:
Will monitor orders
No action needed at this time, pt has hx of CLL.
Awaiting orders, if any.
No action or treatment ordered at this time
Awaiting orders if any

## 2024-04-26 NOTE — PROGRESS NOTE ADULT - ASSESSMENT
83-year-old male history of alcohol abuse asthma hyperlipidemia hypertension hypothyroid, CLL , pw worsening  renal function  Assessment  Hypothyroidism: On Synthroid 25 mcg po daily prior, ?compliance, was stopped outpatient as per family, unsure why, TSH elevated 78, rpt TFTs reveal severe hypothyroid, TPO AB pending , TSH 90 with low FT4 0.3 , was started on IV synthroid. rpt Free thyroxine improved 0.5.   Plan is to discharge on 100 mcg synthroid PO  HTN: on antihypertensive medications, monitored, asymptomatic.  CKD: Monitor labs/BMP, renal follow up.   Hypercalcemia: PTH appropriately low/low normal - not suggestive of primary hyperparathyroid   PTH rp pending- can follow up outpt  VIt D levels unremarkable    Discussed plan and management wit Dr marlo Simmons MD

## 2024-04-26 NOTE — PROVIDER CONTACT NOTE (CRITICAL VALUE NOTIFICATION) - SITUATION
Pt has critical value indicating high WBC count of 82.54
WBC count 77.03
as noted above
as noted above

## 2024-04-26 NOTE — PROGRESS NOTE ADULT - ASSESSMENT
83M with CLL not on treatment, iron deficiency anemia, history of alcohol abuse, asthma, HLD, HTN, hypothyroid with cough, weakness, increased BUN/Cr found to have severe hypothyroid    #CLL  - not on treatment, follows with Dr. Lainez as outpatient  - leukocytosis, lymphocyte predominant- higher than recent baseline but WBC has been at this level in past  - WBC likely higher with acute illness  - plt normal and no evidence of hemolysis  - no need for treatment  - WBC was higher than prior but improved today    #Anemia, normocytic  - lower than prior- likely in setting of acute illness, renal dysfunction, also with hypothyroid and very elevated TSH  - no overt bleeding, check stool occult  - iron studies 4/18 as outpt: ferritin 346, iron 21, iron sat 9; B12/folate elevated  - on oral iron at baseline  - bili is normal, LDH sl elevated, haptoglobin elevated- not c/w hemolysis  - Hg stable    #hypercalcemia likely in setting of volume depletion  - improved from admission, PTH normal, PTHrp pending  - ca persistent elevated, SPEP/IF pending but TP is low and K/L ratio is normal  - no current treatment per Endocrine, FU pending labs     #severe hypothyroid-   - TSH 90, free thryoxine 0.3 on admission  - endocrinology following- on synthroid    DC planning to Rehab. D/w PA, d/w wife bedside

## 2024-04-26 NOTE — PROGRESS NOTE ADULT - PROBLEM SELECTOR PLAN 3
Will continue statin, primary team FU.

## 2024-04-26 NOTE — PROGRESS NOTE ADULT - PROBLEM SELECTOR PLAN 2
Suggest to continue medications, monitoring, FU primary team recommendations.

## 2024-04-26 NOTE — DISCHARGE NOTE NURSING/CASE MANAGEMENT/SOCIAL WORK - PATIENT PORTAL LINK FT
You can access the FollowMyHealth Patient Portal offered by [facility] by registering at the following website: [email or web address]. By joining [facility]’s FollowMyHealth portal, you will also be able to view your health information using other applications (apps) compatible with our system.

## 2024-04-26 NOTE — PROVIDER CONTACT NOTE (CRITICAL VALUE NOTIFICATION) - ASSESSMENT
Pt denies pain or discomfort and VSS
Afebrile,
Pt is AxOx3, all VSS, pt now off abx, finished Zoysn course. Pt has hx of CLL, WBC has been in this high range since admission.

## 2024-04-26 NOTE — PROVIDER CONTACT NOTE (CRITICAL VALUE NOTIFICATION) - BACKGROUND
Admitted with [facility], Hx CLL. Blood cultures X2 sent overnight, and pt started on Zosyn.
Pt has hx of CLL, HLD, HTN and hypothyroidism who was admitted for increased creatinine and BUN in the outpt setting
Pt admitted for ARF, Hx CLL, on Zosyn for 5 days
Pt admitted with acute renal failure, Cr 1.7 on admission, now downtrending. Pt has a history of CLL, HLD, HTN, heme/onc following patient. s/p Zosyn course.
CLL

## 2024-04-26 NOTE — PROGRESS NOTE ADULT - PROBLEM SELECTOR PROBLEM 1
CLL (chronic lymphocytic leukemia)
Hypothyroid
CLL (chronic lymphocytic leukemia)
Hypothyroid

## 2024-04-26 NOTE — PROGRESS NOTE ADULT - PROBLEM SELECTOR PLAN 1
s/p IV synthroid   Synthroid 100 mcg daily on discharge. follow up outpatient   will FU, rpt TFTs in 6 weeks outpatient
s/p IV synthroid   Synthroid 100 mcg daily on discharge. follow up outpatient   will FU, rpt TFTs in 6 weeks outpatient
IV synthroid for now , severe hypothyroid   Synthroid 50 mcg IV daily, will trend FT4  Eventual  transition to increased dose Synthroid 75 mcg daily on discharge. follow up outpatient   will FU, rpt TFTs in 6 weeks outpatient
- blood counts stable  - no need for treatment at this time  - management as per primary team ---     thyroid function -- replacement with synthroid   renal function stable
84 y/o male with h/o CLL with stable Leukocytosis who presents with weakness, dehydration;     There is no evidence of hemolysis and pt's platelets are stable.     No need to treat CLL at this time. Transfuse PRBC if Hb <7    Continue management as per Primary team.
IV synthroid for now , severe hypothyroid   Synthroid 50 mcg IV daily, will trend FT4  will convert to PO in AM   Eventual  transition to increased dose Synthroid 75 mcg daily on discharge. follow up outpatient   will FU, rpt TFTs in 6 weeks outpatient
Will start IV synthroid , severe hypothyroid   Synthroid 50 mcg IV daily, will trend FT4  then will transition to increased dose Synthroid 75 mcg daily  will FU
Will start IV synthroid for now , severe hypothyroid   Synthroid 50 mcg IV daily, will trend FT4  Eventual will transition to increased dose Synthroid 75 mcg daily on discharge.   will FU, rpt TFTs in 6 weeks outpatient
IV synthroid for now , severe hypothyroid   Synthroid 50 mcg IV daily, will trend FT4  will convert to PO in AM   Eventual  transition to increased dose Synthroid 100 mcg daily on discharge. follow up outpatient   will FU, rpt TFTs in 6 weeks outpatient

## 2024-04-26 NOTE — PROGRESS NOTE ADULT - SUBJECTIVE AND OBJECTIVE BOX
Chief complaint  Patient is a 83y old  Male who presents with a chief complaint of creatinine increase (23 Apr 2024 11:36)         Labs and Fingersticks  CAPILLARY BLOOD GLUCOSE          Anion Gap: 9 (04-26 @ 09:17)  Anion Gap: 10 (04-25 @ 08:45)      Calcium: 10.9 *H* (04-26 @ 09:17)  Calcium: 10.8 *H* (04-25 @ 08:45)  Albumin: 3.2 *L* (04-26 @ 09:17)    Alanine Aminotransferase (ALT/SGPT): 21 (04-26 @ 09:17)  Alkaline Phosphatase: 236 *H* (04-26 @ 09:17)  Aspartate Aminotransferase (AST/SGOT): 36 (04-26 @ 09:17)        04-26    137  |  97  |  22  ----------------------------<  107<H>  4.0   |  31  |  1.56<H>    Ca    10.9<H>      26 Apr 2024 09:17    TPro  5.0<L>  /  Alb  3.2<L>  /  TBili  0.4  /  DBili  x   /  AST  36  /  ALT  21  /  AlkPhos  236<H>  04-26                        8.6    77.03 )-----------( 158      ( 26 Apr 2024 09:17 )             29.3     Medications  MEDICATIONS  (STANDING):  acetaminophen   IVPB .. 1000 milliGRAM(s) IV Intermittent once  albuterol/ipratropium for Nebulization 3 milliLiter(s) Nebulizer every 6 hours  aspirin 325 milliGRAM(s) Oral daily  buDESOnide    Inhalation Suspension 0.5 milliGRAM(s) Inhalation two times a day  chlorhexidine 2% Cloths 1 Application(s) Topical daily  famotidine    Tablet 20 milliGRAM(s) Oral daily  folic acid 1 milliGRAM(s) Oral daily  furosemide    Tablet 40 milliGRAM(s) Oral daily  heparin   Injectable 5000 Unit(s) SubCutaneous every 8 hours  levothyroxine 100 MICROGram(s) Oral daily  multivitamin/minerals 1 Tablet(s) Oral daily  QUEtiapine 25 milliGRAM(s) Oral daily  traZODone 50 milliGRAM(s) Oral at bedtime      Physical Exam  General: Patient comfortable in bed   Vital Signs Last 12 Hrs  T(F): 97.6 (04-26-24 @ 09:20), Max: 97.6 (04-26-24 @ 06:00)  HR: 76 (04-26-24 @ 09:20) (76 - 89)  BP: 110/56 (04-26-24 @ 09:20) (110/56 - 132/74)  BP(mean): --  RR: 18 (04-26-24 @ 09:20) (18 - 18)  SpO2: 95% (04-26-24 @ 09:20) (95% - 96%)    CVS: S1S2   Respiratory: No wheezing, no crepitations  GI: Abdomen soft, bowel sounds positive  Musculoskeletal:  moves all extremities

## 2024-04-26 NOTE — PROGRESS NOTE ADULT - NS ATTEND AMEND GEN_ALL_CORE FT
Patient Lab  reviewed, case discussed with the NP.
Lab reviewed, patient management discussed with the NP.
Lab reviewed, plan of care discussed with the NP.
lab reviewed, plan of care discussed with NP.
lab reviewed, plan of care discussed with the NP.

## 2024-04-26 NOTE — PROGRESS NOTE ADULT - PROVIDER SPECIALTY LIST ADULT
Endocrinology
Heme/Onc
Internal Medicine
Endocrinology
Heme/Onc
Internal Medicine
Heme/Onc
Internal Medicine
Heme/Onc
Endocrinology